# Patient Record
Sex: MALE | Race: WHITE | Employment: OTHER | URBAN - METROPOLITAN AREA
[De-identification: names, ages, dates, MRNs, and addresses within clinical notes are randomized per-mention and may not be internally consistent; named-entity substitution may affect disease eponyms.]

---

## 2018-11-04 ENCOUNTER — APPOINTMENT (OUTPATIENT)
Dept: CARDIOLOGY | Facility: CLINIC | Age: 68
DRG: 233 | End: 2018-11-04
Attending: HOSPITALIST
Payer: COMMERCIAL

## 2018-11-04 ENCOUNTER — HOSPITAL ENCOUNTER (INPATIENT)
Facility: CLINIC | Age: 68
LOS: 8 days | Discharge: HOME | DRG: 233 | End: 2018-11-12
Attending: EMERGENCY MEDICINE | Admitting: HOSPITALIST
Payer: COMMERCIAL

## 2018-11-04 ENCOUNTER — APPOINTMENT (OUTPATIENT)
Dept: CARDIOLOGY | Facility: CLINIC | Age: 68
DRG: 233 | End: 2018-11-04
Attending: INTERNAL MEDICINE
Payer: COMMERCIAL

## 2018-11-04 ENCOUNTER — APPOINTMENT (OUTPATIENT)
Dept: GENERAL RADIOLOGY | Facility: CLINIC | Age: 68
DRG: 233 | End: 2018-11-04
Attending: EMERGENCY MEDICINE
Payer: COMMERCIAL

## 2018-11-04 ENCOUNTER — APPOINTMENT (OUTPATIENT)
Dept: CT IMAGING | Facility: CLINIC | Age: 68
DRG: 233 | End: 2018-11-04
Attending: EMERGENCY MEDICINE
Payer: COMMERCIAL

## 2018-11-04 DIAGNOSIS — G89.18 ACUTE POST-OPERATIVE PAIN: ICD-10-CM

## 2018-11-04 DIAGNOSIS — J18.9 PNEUMONIA DUE TO INFECTIOUS ORGANISM, UNSPECIFIED LATERALITY, UNSPECIFIED PART OF LUNG: ICD-10-CM

## 2018-11-04 DIAGNOSIS — I21.4 NSTEMI (NON-ST ELEVATED MYOCARDIAL INFARCTION) (H): ICD-10-CM

## 2018-11-04 DIAGNOSIS — I25.5 ISCHEMIC CARDIOMYOPATHY: ICD-10-CM

## 2018-11-04 DIAGNOSIS — Z95.1 S/P CABG (CORONARY ARTERY BYPASS GRAFT): Primary | ICD-10-CM

## 2018-11-04 PROBLEM — I24.9 ACS (ACUTE CORONARY SYNDROME) (H): Status: ACTIVE | Noted: 2018-11-04

## 2018-11-04 LAB
ALBUMIN SERPL-MCNC: 3.9 G/DL (ref 3.4–5)
ALP SERPL-CCNC: 87 U/L (ref 40–150)
ALT SERPL W P-5'-P-CCNC: 19 U/L (ref 0–70)
ANION GAP SERPL CALCULATED.3IONS-SCNC: 8 MMOL/L (ref 3–14)
AST SERPL W P-5'-P-CCNC: 34 U/L (ref 0–45)
BASE EXCESS BLDV CALC-SCNC: 1 MMOL/L
BASOPHILS # BLD AUTO: 0 10E9/L (ref 0–0.2)
BASOPHILS NFR BLD AUTO: 0.1 %
BILIRUB SERPL-MCNC: 1.1 MG/DL (ref 0.2–1.3)
BUN SERPL-MCNC: 19 MG/DL (ref 7–30)
CALCIUM SERPL-MCNC: 9 MG/DL (ref 8.5–10.1)
CHLORIDE SERPL-SCNC: 107 MMOL/L (ref 94–109)
CO2 BLDCOV-SCNC: 24 MMOL/L (ref 21–28)
CO2 SERPL-SCNC: 25 MMOL/L (ref 20–32)
CREAT SERPL-MCNC: 1.03 MG/DL (ref 0.66–1.25)
DIFFERENTIAL METHOD BLD: ABNORMAL
EOSINOPHIL # BLD AUTO: 0 10E9/L (ref 0–0.7)
EOSINOPHIL NFR BLD AUTO: 0.1 %
ERYTHROCYTE [DISTWIDTH] IN BLOOD BY AUTOMATED COUNT: 12.4 % (ref 10–15)
ERYTHROCYTE [DISTWIDTH] IN BLOOD BY AUTOMATED COUNT: 12.6 % (ref 10–15)
FLUAV+FLUBV AG SPEC QL: NEGATIVE
FLUAV+FLUBV AG SPEC QL: NEGATIVE
GFR SERPL CREATININE-BSD FRML MDRD: 71 ML/MIN/1.7M2
GLUCOSE SERPL-MCNC: 132 MG/DL (ref 70–99)
GRAM STN SPEC: NORMAL
HBA1C MFR BLD: 5.9 % (ref 0–5.6)
HCO3 BLDV-SCNC: 27 MMOL/L (ref 21–28)
HCT VFR BLD AUTO: 37.2 % (ref 40–53)
HCT VFR BLD AUTO: 42.9 % (ref 40–53)
HGB BLD-MCNC: 12.8 G/DL (ref 13.3–17.7)
HGB BLD-MCNC: 14.6 G/DL (ref 13.3–17.7)
IMM GRANULOCYTES # BLD: 0.1 10E9/L (ref 0–0.4)
IMM GRANULOCYTES NFR BLD: 0.4 %
INTERPRETATION ECG - MUSE: NORMAL
INTERPRETATION ECG - MUSE: NORMAL
LACTATE BLD-SCNC: 1.4 MMOL/L (ref 0.7–2.1)
LMWH PPP CHRO-ACNC: 0.18 IU/ML
LMWH PPP CHRO-ACNC: 0.28 IU/ML
LYMPHOCYTES # BLD AUTO: 0.7 10E9/L (ref 0.8–5.3)
LYMPHOCYTES NFR BLD AUTO: 5.3 %
Lab: NORMAL
MCH RBC QN AUTO: 34.6 PG (ref 26.5–33)
MCH RBC QN AUTO: 34.9 PG (ref 26.5–33)
MCHC RBC AUTO-ENTMCNC: 34 G/DL (ref 31.5–36.5)
MCHC RBC AUTO-ENTMCNC: 34.4 G/DL (ref 31.5–36.5)
MCV RBC AUTO: 101 FL (ref 78–100)
MCV RBC AUTO: 102 FL (ref 78–100)
MONOCYTES # BLD AUTO: 1.1 10E9/L (ref 0–1.3)
MONOCYTES NFR BLD AUTO: 8.2 %
NEUTROPHILS # BLD AUTO: 11.9 10E9/L (ref 1.6–8.3)
NEUTROPHILS NFR BLD AUTO: 85.9 %
NRBC # BLD AUTO: 0 10*3/UL
NRBC BLD AUTO-RTO: 0 /100
OXYHGB MFR BLDV: 25 %
PCO2 BLDV: 41 MM HG (ref 40–50)
PCO2 BLDV: 45 MM HG (ref 40–50)
PH BLDV: 7.37 PH (ref 7.32–7.43)
PH BLDV: 7.38 PH (ref 7.32–7.43)
PLATELET # BLD AUTO: 112 10E9/L (ref 150–450)
PLATELET # BLD AUTO: 129 10E9/L (ref 150–450)
PO2 BLDV: 18 MM HG (ref 25–47)
PO2 BLDV: 24 MM HG (ref 25–47)
POTASSIUM SERPL-SCNC: 4 MMOL/L (ref 3.4–5.3)
PROCALCITONIN SERPL-MCNC: <0.05 NG/ML
PROT SERPL-MCNC: 7.3 G/DL (ref 6.8–8.8)
RBC # BLD AUTO: 3.67 10E12/L (ref 4.4–5.9)
RBC # BLD AUTO: 4.22 10E12/L (ref 4.4–5.9)
SAO2 % BLDV FROM PO2: 42 %
SODIUM SERPL-SCNC: 140 MMOL/L (ref 133–144)
SPECIMEN SOURCE: NORMAL
SPECIMEN SOURCE: NORMAL
TROPONIN I SERPL-MCNC: 13.12 UG/L (ref 0–0.04)
TROPONIN I SERPL-MCNC: 17.82 UG/L (ref 0–0.04)
TROPONIN I SERPL-MCNC: 2.42 UG/L (ref 0–0.04)
WBC # BLD AUTO: 13.9 10E9/L (ref 4–11)
WBC # BLD AUTO: 14.5 10E9/L (ref 4–11)

## 2018-11-04 PROCEDURE — 93306 TTE W/DOPPLER COMPLETE: CPT | Mod: 26 | Performed by: INTERNAL MEDICINE

## 2018-11-04 PROCEDURE — 99285 EMERGENCY DEPT VISIT HI MDM: CPT | Mod: 25

## 2018-11-04 PROCEDURE — 99223 1ST HOSP IP/OBS HIGH 75: CPT | Mod: 25 | Performed by: INTERNAL MEDICINE

## 2018-11-04 PROCEDURE — 86698 HISTOPLASMA ANTIBODY: CPT | Performed by: INTERNAL MEDICINE

## 2018-11-04 PROCEDURE — 82803 BLOOD GASES ANY COMBINATION: CPT

## 2018-11-04 PROCEDURE — 86606 ASPERGILLUS ANTIBODY: CPT | Performed by: INTERNAL MEDICINE

## 2018-11-04 PROCEDURE — 40000264 ECHO COMPLETE WITH OPTISON

## 2018-11-04 PROCEDURE — 96368 THER/DIAG CONCURRENT INF: CPT

## 2018-11-04 PROCEDURE — 85027 COMPLETE CBC AUTOMATED: CPT | Performed by: HOSPITALIST

## 2018-11-04 PROCEDURE — 25000132 ZZH RX MED GY IP 250 OP 250 PS 637: Performed by: EMERGENCY MEDICINE

## 2018-11-04 PROCEDURE — 87804 INFLUENZA ASSAY W/OPTIC: CPT | Performed by: HOSPITALIST

## 2018-11-04 PROCEDURE — 36415 COLL VENOUS BLD VENIPUNCTURE: CPT | Performed by: INTERNAL MEDICINE

## 2018-11-04 PROCEDURE — 99152 MOD SED SAME PHYS/QHP 5/>YRS: CPT | Performed by: INTERNAL MEDICINE

## 2018-11-04 PROCEDURE — 86635 COCCIDIOIDES ANTIBODY: CPT | Performed by: INTERNAL MEDICINE

## 2018-11-04 PROCEDURE — 25000128 H RX IP 250 OP 636: Performed by: EMERGENCY MEDICINE

## 2018-11-04 PROCEDURE — 96376 TX/PRO/DX INJ SAME DRUG ADON: CPT

## 2018-11-04 PROCEDURE — 71260 CT THORAX DX C+: CPT

## 2018-11-04 PROCEDURE — 87305 ASPERGILLUS AG IA: CPT | Performed by: INTERNAL MEDICINE

## 2018-11-04 PROCEDURE — 25000132 ZZH RX MED GY IP 250 OP 250 PS 637: Performed by: HOSPITALIST

## 2018-11-04 PROCEDURE — 93005 ELECTROCARDIOGRAM TRACING: CPT

## 2018-11-04 PROCEDURE — 96361 HYDRATE IV INFUSION ADD-ON: CPT

## 2018-11-04 PROCEDURE — 87040 BLOOD CULTURE FOR BACTERIA: CPT | Performed by: EMERGENCY MEDICINE

## 2018-11-04 PROCEDURE — 87449 NOS EACH ORGANISM AG IA: CPT | Performed by: INTERNAL MEDICINE

## 2018-11-04 PROCEDURE — 87070 CULTURE OTHR SPECIMN AEROBIC: CPT | Performed by: HOSPITALIST

## 2018-11-04 PROCEDURE — 83036 HEMOGLOBIN GLYCOSYLATED A1C: CPT | Performed by: EMERGENCY MEDICINE

## 2018-11-04 PROCEDURE — 21000000 ZZH R&B IMCU HEART CARE

## 2018-11-04 PROCEDURE — 83605 ASSAY OF LACTIC ACID: CPT

## 2018-11-04 PROCEDURE — 99207 ZZC NON-BILLABLE SERV PER CHARTING: CPT | Performed by: INTERNAL MEDICINE

## 2018-11-04 PROCEDURE — 84484 ASSAY OF TROPONIN QUANT: CPT | Performed by: EMERGENCY MEDICINE

## 2018-11-04 PROCEDURE — 96366 THER/PROPH/DIAG IV INF ADDON: CPT

## 2018-11-04 PROCEDURE — 96365 THER/PROPH/DIAG IV INF INIT: CPT | Mod: 59

## 2018-11-04 PROCEDURE — 94640 AIRWAY INHALATION TREATMENT: CPT

## 2018-11-04 PROCEDURE — 86612 BLASTOMYCES ANTIBODY: CPT | Performed by: INTERNAL MEDICINE

## 2018-11-04 PROCEDURE — 93458 L HRT ARTERY/VENTRICLE ANGIO: CPT | Mod: 26 | Performed by: INTERNAL MEDICINE

## 2018-11-04 PROCEDURE — 25000132 ZZH RX MED GY IP 250 OP 250 PS 637: Performed by: INTERNAL MEDICINE

## 2018-11-04 PROCEDURE — 93010 ELECTROCARDIOGRAM REPORT: CPT | Performed by: INTERNAL MEDICINE

## 2018-11-04 PROCEDURE — 71046 X-RAY EXAM CHEST 2 VIEWS: CPT

## 2018-11-04 PROCEDURE — 87385 HISTOPLASMA CAPSUL AG IA: CPT | Performed by: INTERNAL MEDICINE

## 2018-11-04 PROCEDURE — 99223 1ST HOSP IP/OBS HIGH 75: CPT | Mod: AI | Performed by: HOSPITALIST

## 2018-11-04 PROCEDURE — 85025 COMPLETE CBC W/AUTO DIFF WBC: CPT | Performed by: EMERGENCY MEDICINE

## 2018-11-04 PROCEDURE — 25000128 H RX IP 250 OP 636: Performed by: HOSPITALIST

## 2018-11-04 PROCEDURE — 25500064 ZZH RX 255 OP 636: Performed by: HOSPITALIST

## 2018-11-04 PROCEDURE — 80053 COMPREHEN METABOLIC PANEL: CPT | Performed by: EMERGENCY MEDICINE

## 2018-11-04 PROCEDURE — 87205 SMEAR GRAM STAIN: CPT | Performed by: HOSPITALIST

## 2018-11-04 PROCEDURE — 36415 COLL VENOUS BLD VENIPUNCTURE: CPT | Performed by: HOSPITALIST

## 2018-11-04 PROCEDURE — 93005 ELECTROCARDIOGRAM TRACING: CPT | Mod: 76

## 2018-11-04 PROCEDURE — 85520 HEPARIN ASSAY: CPT | Performed by: HOSPITALIST

## 2018-11-04 PROCEDURE — 25000125 ZZHC RX 250: Performed by: EMERGENCY MEDICINE

## 2018-11-04 PROCEDURE — 82805 BLOOD GASES W/O2 SATURATION: CPT | Performed by: EMERGENCY MEDICINE

## 2018-11-04 PROCEDURE — 84145 PROCALCITONIN (PCT): CPT | Performed by: HOSPITALIST

## 2018-11-04 PROCEDURE — 84484 ASSAY OF TROPONIN QUANT: CPT | Performed by: HOSPITALIST

## 2018-11-04 PROCEDURE — 33967 INSERT I-AORT PERCUT DEVICE: CPT | Performed by: INTERNAL MEDICINE

## 2018-11-04 RX ORDER — COVID-19 ANTIGEN TEST
220 KIT MISCELLANEOUS 2 TIMES DAILY WITH MEALS
Status: ON HOLD | COMMUNITY
End: 2018-11-12

## 2018-11-04 RX ORDER — ASPIRIN 81 MG/1
81 TABLET, CHEWABLE ORAL DAILY
Status: DISCONTINUED | OUTPATIENT
Start: 2018-11-05 | End: 2018-11-05

## 2018-11-04 RX ORDER — CEFTRIAXONE 1 G/1
1 INJECTION, POWDER, FOR SOLUTION INTRAMUSCULAR; INTRAVENOUS EVERY 24 HOURS
Status: COMPLETED | OUTPATIENT
Start: 2018-11-05 | End: 2018-11-10

## 2018-11-04 RX ORDER — OXYCODONE HYDROCHLORIDE 5 MG/1
5-10 TABLET ORAL
Status: DISCONTINUED | OUTPATIENT
Start: 2018-11-04 | End: 2018-11-06

## 2018-11-04 RX ORDER — LORATADINE 10 MG/1
10 TABLET ORAL DAILY
COMMUNITY
End: 2022-08-03 | Stop reason: ALTCHOICE

## 2018-11-04 RX ORDER — ASPIRIN 81 MG/1
324 TABLET, CHEWABLE ORAL ONCE
Status: COMPLETED | OUTPATIENT
Start: 2018-11-04 | End: 2018-11-04

## 2018-11-04 RX ORDER — PREDNISONE 20 MG/1
60 TABLET ORAL ONCE
Status: COMPLETED | OUTPATIENT
Start: 2018-11-04 | End: 2018-11-04

## 2018-11-04 RX ORDER — ASPIRIN 81 MG/1
324 TABLET, CHEWABLE ORAL ONCE
Status: DISCONTINUED | OUTPATIENT
Start: 2018-11-04 | End: 2018-11-04

## 2018-11-04 RX ORDER — LIDOCAINE 40 MG/G
CREAM TOPICAL
Status: DISCONTINUED | OUTPATIENT
Start: 2018-11-04 | End: 2018-11-04

## 2018-11-04 RX ORDER — IPRATROPIUM BROMIDE AND ALBUTEROL SULFATE 2.5; .5 MG/3ML; MG/3ML
3 SOLUTION RESPIRATORY (INHALATION) ONCE
Status: COMPLETED | OUTPATIENT
Start: 2018-11-04 | End: 2018-11-04

## 2018-11-04 RX ORDER — ASPIRIN 325 MG
325 TABLET ORAL DAILY
Status: DISCONTINUED | OUTPATIENT
Start: 2018-11-05 | End: 2018-11-04

## 2018-11-04 RX ORDER — AMOXICILLIN 250 MG
2 CAPSULE ORAL 2 TIMES DAILY PRN
Status: DISCONTINUED | OUTPATIENT
Start: 2018-11-04 | End: 2018-11-06

## 2018-11-04 RX ORDER — AMOXICILLIN 250 MG
1 CAPSULE ORAL 2 TIMES DAILY PRN
Status: DISCONTINUED | OUTPATIENT
Start: 2018-11-04 | End: 2018-11-06

## 2018-11-04 RX ORDER — PREDNISONE 20 MG/1
40 TABLET ORAL DAILY
Status: DISCONTINUED | OUTPATIENT
Start: 2018-11-05 | End: 2018-11-08

## 2018-11-04 RX ORDER — IPRATROPIUM BROMIDE AND ALBUTEROL SULFATE 2.5; .5 MG/3ML; MG/3ML
3 SOLUTION RESPIRATORY (INHALATION) EVERY 4 HOURS PRN
Status: DISCONTINUED | OUTPATIENT
Start: 2018-11-04 | End: 2018-11-06

## 2018-11-04 RX ORDER — LIDOCAINE 40 MG/G
CREAM TOPICAL
Status: DISCONTINUED | OUTPATIENT
Start: 2018-11-04 | End: 2018-11-05

## 2018-11-04 RX ORDER — BISACODYL 10 MG
10 SUPPOSITORY, RECTAL RECTAL DAILY PRN
Status: DISCONTINUED | OUTPATIENT
Start: 2018-11-04 | End: 2018-11-06

## 2018-11-04 RX ORDER — NALOXONE HYDROCHLORIDE 0.4 MG/ML
.1-.4 INJECTION, SOLUTION INTRAMUSCULAR; INTRAVENOUS; SUBCUTANEOUS
Status: DISCONTINUED | OUTPATIENT
Start: 2018-11-04 | End: 2018-11-06

## 2018-11-04 RX ORDER — ATORVASTATIN CALCIUM 80 MG/1
80 TABLET, FILM COATED ORAL EVERY EVENING
Status: DISCONTINUED | OUTPATIENT
Start: 2018-11-04 | End: 2018-11-05

## 2018-11-04 RX ORDER — ALUMINA, MAGNESIA, AND SIMETHICONE 2400; 2400; 240 MG/30ML; MG/30ML; MG/30ML
30 SUSPENSION ORAL EVERY 4 HOURS PRN
Status: DISCONTINUED | OUTPATIENT
Start: 2018-11-04 | End: 2018-11-06

## 2018-11-04 RX ORDER — NICOTINE 21 MG/24HR
1 PATCH, TRANSDERMAL 24 HOURS TRANSDERMAL DAILY
Status: DISCONTINUED | OUTPATIENT
Start: 2018-11-04 | End: 2018-11-12 | Stop reason: HOSPADM

## 2018-11-04 RX ORDER — CEFTRIAXONE 1 G/1
1 INJECTION, POWDER, FOR SOLUTION INTRAMUSCULAR; INTRAVENOUS ONCE
Status: COMPLETED | OUTPATIENT
Start: 2018-11-04 | End: 2018-11-04

## 2018-11-04 RX ORDER — POLYETHYLENE GLYCOL 3350 17 G/17G
17 POWDER, FOR SOLUTION ORAL DAILY PRN
Status: DISCONTINUED | OUTPATIENT
Start: 2018-11-04 | End: 2018-11-06

## 2018-11-04 RX ORDER — HYDROMORPHONE HYDROCHLORIDE 1 MG/ML
0.2 INJECTION, SOLUTION INTRAMUSCULAR; INTRAVENOUS; SUBCUTANEOUS
Status: DISCONTINUED | OUTPATIENT
Start: 2018-11-04 | End: 2018-11-06

## 2018-11-04 RX ORDER — IOPAMIDOL 755 MG/ML
80 INJECTION, SOLUTION INTRAVASCULAR ONCE
Status: COMPLETED | OUTPATIENT
Start: 2018-11-04 | End: 2018-11-04

## 2018-11-04 RX ORDER — ONDANSETRON 4 MG/1
4 TABLET, ORALLY DISINTEGRATING ORAL EVERY 6 HOURS PRN
Status: DISCONTINUED | OUTPATIENT
Start: 2018-11-04 | End: 2018-11-06

## 2018-11-04 RX ORDER — ACETAMINOPHEN 650 MG/1
650 SUPPOSITORY RECTAL EVERY 4 HOURS PRN
Status: DISCONTINUED | OUTPATIENT
Start: 2018-11-04 | End: 2018-11-06

## 2018-11-04 RX ORDER — NITROGLYCERIN 0.4 MG/1
0.4 TABLET SUBLINGUAL EVERY 5 MIN PRN
Status: DISCONTINUED | OUTPATIENT
Start: 2018-11-04 | End: 2018-11-06

## 2018-11-04 RX ORDER — ONDANSETRON 2 MG/ML
4 INJECTION INTRAMUSCULAR; INTRAVENOUS EVERY 6 HOURS PRN
Status: DISCONTINUED | OUTPATIENT
Start: 2018-11-04 | End: 2018-11-06

## 2018-11-04 RX ORDER — ACETAMINOPHEN 325 MG/1
650 TABLET ORAL EVERY 4 HOURS PRN
Status: DISCONTINUED | OUTPATIENT
Start: 2018-11-04 | End: 2018-11-05 | Stop reason: DRUGHIGH

## 2018-11-04 RX ADMIN — ASPIRIN 81 MG 324 MG: 81 TABLET ORAL at 04:13

## 2018-11-04 RX ADMIN — ATORVASTATIN CALCIUM 80 MG: 80 TABLET, FILM COATED ORAL at 19:59

## 2018-11-04 RX ADMIN — PREDNISONE 60 MG: 20 TABLET ORAL at 03:57

## 2018-11-04 RX ADMIN — SODIUM CHLORIDE 1000 ML: 9 INJECTION, SOLUTION INTRAVENOUS at 07:19

## 2018-11-04 RX ADMIN — IPRATROPIUM BROMIDE AND ALBUTEROL SULFATE 3 ML: 2.5; .5 SOLUTION RESPIRATORY (INHALATION) at 04:03

## 2018-11-04 RX ADMIN — SODIUM CHLORIDE 1000 ML: 9 INJECTION, SOLUTION INTRAVENOUS at 06:14

## 2018-11-04 RX ADMIN — CEFTRIAXONE SODIUM 1 G: 1 INJECTION, POWDER, FOR SOLUTION INTRAMUSCULAR; INTRAVENOUS at 07:24

## 2018-11-04 RX ADMIN — HEPARIN SODIUM 1000 UNITS/HR: 10000 INJECTION, SOLUTION INTRAVENOUS at 05:08

## 2018-11-04 RX ADMIN — HUMAN ALBUMIN MICROSPHERES AND PERFLUTREN 9 ML: 10; .22 INJECTION, SOLUTION INTRAVENOUS at 11:00

## 2018-11-04 RX ADMIN — IOPAMIDOL 80 ML: 755 INJECTION, SOLUTION INTRAVENOUS at 06:01

## 2018-11-04 RX ADMIN — SODIUM CHLORIDE, PRESERVATIVE FREE 70 ML: 5 INJECTION INTRAVENOUS at 06:01

## 2018-11-04 RX ADMIN — SODIUM CHLORIDE 1000 ML: 9 INJECTION, SOLUTION INTRAVENOUS at 05:27

## 2018-11-04 RX ADMIN — Medication 5000 UNITS: at 05:09

## 2018-11-04 RX ADMIN — NICOTINE 1 PATCH: 14 PATCH, EXTENDED RELEASE TRANSDERMAL at 10:30

## 2018-11-04 RX ADMIN — AZITHROMYCIN MONOHYDRATE 500 MG: 500 INJECTION, POWDER, LYOPHILIZED, FOR SOLUTION INTRAVENOUS at 07:51

## 2018-11-04 RX ADMIN — SODIUM CHLORIDE 1000 ML: 9 INJECTION, SOLUTION INTRAVENOUS at 04:06

## 2018-11-04 ASSESSMENT — ENCOUNTER SYMPTOMS
VOMITING: 0
SHORTNESS OF BREATH: 1
COUGH: 1
CHILLS: 1
ABDOMINAL PAIN: 0
NAUSEA: 0

## 2018-11-04 NOTE — PLAN OF CARE
Problem: Patient Care Overview  Goal: Plan of Care/Patient Progress Review  OT/CR: Order rec'd. Noted patient planning cardiac cath tomorrow. Holding eval until complete.

## 2018-11-04 NOTE — CONSULTS
Consult Date:  11/04/2018      REASON FOR CONSULTATION:  Acute coronary syndrome.      HISTORY OF PRESENT ILLNESS:  Mr. Schoenecker is a 68-year-old male with a background history of dyslipidemia (untreated secondary to statin intolerance), longstanding smoker, a substantial family history of early onset coronary artery disease, who presents to [facility] with acute onset of shortness of breath and  burning retrosternal chest pain.  Mr. Schoenecker describes that he has had multiple episodes of exertional anginal-like chest pains x1 week.  Notably, he describes an occasion where he was chopping down a tree, and required frequent rest to resolve his burning chest discomfort.  Last evening, Mr. Schoenecker developed a cough and acute onset dyspnea at rest that prompted his evaluation in the Emergency Department.  While there, he was evaluated for ACS and provided DuoNeb which appeared to relieve a lot of his dyspnea.      He was subsequently provided aspirin and heparin, given his abnormal biomarker evaluation and ischemic appearing ECG.  He was subsequently admitted to the CCU for further care and evaluation.  It is in this context I was consulted.      I spoke with Mr. Schoenecker.  He is without active ischemic symptoms at this time.  He endorses the history above.  He describes no further complaints.      Vital signs reveal that he is afebrile, heart rate of 96, blood pressure 99/54, oxygen saturation 94% on 2 L nasal cannula.        LABORATORY DATA:  White count is elevated at 13.9, hemoglobin is 14.6, platelet count is 129.  Creatinine is normal at 1.03.  Electrolyte panel is otherwise largely unremarkable.  Index troponin is markedly elevated at 2.4.  Chest x-ray reveals no acute pulmonary infiltrates, absent cardiomegaly and subtle findings suggestive of pulmonary venous hypertension.  A CT of the chest reveals no acute aortic process, and some nodular opacities in the fields suggestive of  underlying infectious process.       A 12-lead ECG demonstrates lateral ST segment depressions and T-wave inversions.      A transthoracic echocardiogram has been ordered, and is currently pending.      FAMILY HISTORY:  Markedly positive for early onset coronary artery disease in family members.      SOCIAL HISTORY:  The patient is a long-standing tobacco user, quit yesterday.  No alcohol or drug use.      REVIEW OF SYSTEMS:  Otherwise negative except as what is already noted in the HPI.      PHYSICAL EXAMINATION:   GENERAL:  Chronically ill-appearing gentleman who appears older than stated age, obese, friendly, sensorium clear, anxious, family members present during the encounter.   HEENT:  No major abnormalities.   NECK:  Vessels, non-elevated JVP.   LUNGS:  Positive prolonged expiratory phase of breathing, positive scattered wheezes, increased AP diameter of the thoracic cavity, positive diminished respiratory excursion.   CARDIOVASCULAR:  Heart normal S1, S2.  No murmurs detected.   ABDOMEN:  Soft, nontender, nondistended.  Moderate adiposity.   EXTREMITIES:  Mild edema, acceptable perfusion.      IMPRESSION AND PLAN:   1.  Acute coronary syndrome.   2.  Untreated dyslipidemia.   3.  Statin intolerance.   4.  Smoking.     5.  Probable chronic obstructive pulmonary disease.   6.  Probable overlapping pneumonia.        Mr. Schoenecker presents to [facility] with evidence of an acute coronary syndrome (i.e. NSTEMI).  This occurs in the context of an apparent COPD exacerbation and also pneumonia.  The hospital medicine team is interested in providing dedicated therapy to address these additional concerns, and I am in agreement with this approach.      In terms of management for his coronary artery disease, we will seek antithrombotic therapies including aspirin and heparin.  We will make arrangements for a coronary catheterization to occur tomorrow.      I would like to see a transthoracic  echocardiogram to be performed today to assess his cardiovascular disease morbidity risk.  If he is with adequate left ventricular function and BP, I will seek to introduce low dose beta blockade.       Thank you for this consultation.  We will follow along carefully, and obtain a consent for cardiac catheterization to occur tomorrow.         GABE RIVAS MD             D: 2018   T: 2018   MT: ARPIT      Name:     SCHOENECKER, DAVID   MRN:      [id number]        Account:       [id number]   :      1950           Consult Date:  2018      Document: [id number]

## 2018-11-04 NOTE — ED PROVIDER NOTES
"  History     Chief Complaint:    Shortness of Breath       HPI   David Richard Schoenecker is a 68 year old male with a history of tobacco abuse who presents to the ED via EMS for evaluation of shortness of breath. The patient states that he has been having shortness about breath for about a week. A few days ago, he states that he was severally short of breath and experience burning chest pain while chopping wood.  He also reports that he and his wife have been sick with a cold and cough over the last few days.  Tonight, he states that he was extremely short of breath with only mild exertion with some burning in his chest which ultimately prompted him to call EMS and was brought to the ED. EMS states his oxygen saturation was in the upper 80's on room air and he had audible wheezes. They administered one duoneb and he states that he feels significantly better now. He notes that his wife has flu-like symptoms, cough, fever, and chills, which he has contracted as well. Patient has a low-grade fever of 99.6 here. The patient has no other complaints.     Allergies:  The patient has no known drug allergies.    Medications:    The patient is currently on no regular medications.    Past Medical History:    Hyperlipidemia    Past Surgical History:    Appendectomy  Tonsillectomy    Family History:    No past pertinent family history.    Social History:  Current every day smoker.  Negative for alcohol use.       Review of Systems   Constitutional: Positive for chills.   Respiratory: Positive for cough and shortness of breath.    Cardiovascular: Positive for chest pain.   Gastrointestinal: Negative for abdominal pain, nausea and vomiting.   All other systems reviewed and are negative.        Physical Exam   First Vitals:  BP: 111/55  Pulse: 125  Heart Rate: 117  Temp: 99.6  F (37.6  C)  Resp: 28  Height: 180.3 cm (5' 11\")  Weight: 97.1 kg (214 lb)  SpO2: 97 %      Physical Exam  General: Appears well-developed and " well-nourished.   Head: No signs of trauma.   Mouth/Throat: Oropharynx is clear and moist.   CV: Tachycardic and regular rhythm.    Resp: Wheezing bilaterally with decreased air movement.    GI: Soft. There is no tenderness.  No rebound or guarding.  Normal bowel sounds.  No CVA tenderness.  MSK: Normal range of motion. no edema. No Calf tenderness.  Neuro: The patient is alert and oriented.  Speech normal.  GCS 15  Skin: Skin is warm and dry. No rash noted.   Psych: normal mood and affect. behavior is normal.       Emergency Department Course   ECG:  Indication: SOB  Time: 0321  Vent. Rate 124 bpm. GA interval 126. QRS duration 86. QT/QTc 332/476. P-R-T axis 71 62 -63.  Sinus tachycardia. Cannot rule out Inferior infarct, age undetermined. ST and T wave abnormality, consider lateral ischemia. Abnormal ECG.  Read time: 0350    Indication: Hypotensive  Time: 0531  Vent. Rate 104 bpm. GA interval 120. QRS duration 88. QT/QTc 372/489. P-R-T axis 64 63 213.  Sinus tachycardia. Cannot rule out inferior infarct, age undetermined. ST and T wave abnormality, consider lateral ischemia. Abnormal ECG. Read time: 0536      Imaging:  Radiographic findings were communicated with the patient who voiced understanding of the findings.  XR Chest PA and LAT:   No radiographic evidence of acute chest abnormality as per radiology.    CT Aortic Survery with contrast:   1. No evidence of aortic dissection.  2. Nodular bilateral airspace opacities surrounded by groundglass  opacity. There is associated mediastinal and left hilar adenopathy.  Radiographic findings suggestive of a fungal infection, although  multifocal bacterial pneumonia or metastatic disease can have a  similar appearance.  3. Nonobstructing bilateral nephrolithiasis.  4. Small hiatal hernia as per radiology.      Laboratory:  CBC: WBC: 13.9 (H), HGB: 14.6, PLT: 129 (L)  CMP: Glucose 132 (H), o/w WNL (Creatinine: 1.03)    0338 Troponin: 2.420 (HH)  ISTAT VBG: pH 7.37 /  PCO2 41 / PO2 24 (L) / Bicarb 24 / lactic acid 1.4  VBG: pH 7.38 / PCO2 45 / PO2 18 (L) / Bicarb 27  Hemoglobin A1C: 5.9 (H)      Interventions:  0357 Prednisone 60 mg PO  0403 Duoneb 3 mLs nebulization  0406 NS 1,000 mLs IV  0413 Aspirin 324 mg PO  0508 Heparin 1,000 units/hr IV  0509 Heparin 5000 units IV  0527 NS 1,000 mLs IV  0614 NS 1,000 mLs IV  Please see MAR for full list of medications administered in the ED.      Emergency Department Course:  Nursing notes and vitals reviewed. (0342) I performed an exam of the patient as documented above.     EKG obtained in the ED, see results above.     IV inserted. Medicine administered as documented above. Blood drawn. This was sent to the lab for further testing, results above.     The patient was sent for a chest XR and Aortic survey CT while in the emergency department, findings above.     0430 I rechecked the patient and discussed the results of his workup thus far.     0447  I consulted with Dr. Rodriguez of the hospitalist services. They are in agreement to accept the patient for admission.    0645 I consulted with Cardiology regarding the patient's history and presentation here in the emergency department.    0651 I consulted with Dr. Rodriguez regarding the patient's history and presentation here in the emergency department.    Findings and plan explained to the Patient who consents to admission. Discussed the patient with Dr. Rodriguez, who will admit the patient to a [facility] bed for further monitoring, evaluation, and treatment.    Impression & Plan    Medical Decision Making:  David Schoenecker is a 68-year-old gentleman who presents due to shortness of breath and some chest discomfort. He reports that he has had a cough for a few days and when he was doing work outside, he felt much more short of breath and had some discomfort in the chest with minimal exertion.  This evening, he felt more short of breath and ultimately came into the ER.  On initial presentation, he  was breathing through pursed lips and had wheezing bilaterally.  He had been given nebulizers and actually felt quite a bit better.  He does not have a diagnosis of asthma or COPD, but does smoke quite a bit.  EKG did show some depressions in the lateral leads and blood work was obtained.  I did obtain a lactic acid which was negative but his troponin came back significantly elevated at 2.4.  Patient had been given aspirin and heparin was initiated.  Patient's blood pressures initially were appropriate but became somewhat soft dipping into the 80s.  He was given IV fluids and ultimately these did rebound.  Given this, I did obtain a CT scan to evaluate the aorta primarily and this did not show any signs of dissection.  On the CT scan, there was some signs of possible pneumonia versus fungal infection versus metastatic disease.  It was decided to initiate antibiotics as the patient has been having a cough and he will be admitted for continued monitoring.  I did discuss the case with cardiology given the soft blood pressures who recommended continued monitoring and an echo on admission but did not recommend going directly to the Cath Lab at this time.  Overall patient did feel considerably better after treatment in the emergency department.      Critical Care time:  30 minutes excluding procedures    Diagnosis:    ICD-10-CM    1. NSTEMI (non-ST elevated myocardial infarction) (H) I21.4 Hemoglobin A1c     Hemoglobin A1c     CANCELED: Hemoglobin A1c   2. Pneumonia due to infectious organism, unspecified laterality, unspecified part of lung J18.9        Disposition:  Admitted to Dr. Rodriguez    Scribe Disclosure:  I,  German Terrazas, am serving as a scribe on 11/4/2018 at 3:42 AM to personally document services performed by Arnulfo Odonnell MD based on my observations and the provider's statements to me.          German Terrazas  11/4/2018    EMERGENCY DEPARTMENT       Arnulfo Odonnell MD  11/04/18 [id number]

## 2018-11-04 NOTE — CONSULTS
[facility]    Infectious Disease Consultation     Date of Admission:  11/4/2018  Date of Consult (When I saw the patient): 11/04/18    Assessment & Plan   David Richard Schoenecker is a 68 year old male who was admitted on 11/4/2018.     Impression:  1. 68 y.o male with ACS.   2. Smoking. ? COPD.   3. Possible pneumonia.   4. For now on CAP treatment.   5. Imaging raising possibility of fungal process vs malignancy.     Recommendations:   Get sputum for cultures.   Consult pulmonary, possible bronch.   Fungal lab studies ordered.   Continue on CAP antibiotics.           Peter Hickman MD    Reason for Consult   Reason for consult: I was asked by Dr. Rodriguez to evaluate this patient for pneumonia.    Primary Care Physician   Physician No Ref-Primary    Chief Complaint   Shortness of breath     History is obtained from the patient and medical records    History of Present Illness   David Richard Schoenecker is a 68 year old male who presented with dyspnea today while at rest, persistent all evening causing him to come in. He has had associated burning chest discomfort with left shoulder and arm numbness as well. He adds that he thinks the symptoms started about a week ago after he had been chopping some wood; that day they went away with rest but have been intermittent since with exertion, and then tonight they came on at rest as well. He and his family have been sick with the flu recently by his report. He denies fever, abdominal pain, leg swelling, or any other acute complaints and feels better in the ED after getting a nebulizer.     Past Medical History   I have reviewed this patient's medical history and updated it with pertinent information if needed.   Past Medical History:   Diagnosis Date     High cholesterol        Past Surgical History   I have reviewed this patient's surgical history and updated it with pertinent information if needed.  Past Surgical History:   Procedure Laterality Date      APPENDECTOMY       ENT SURGERY      tonsillectomy       Prior to Admission Medications   Prior to Admission Medications   Prescriptions Last Dose Informant Patient Reported? Taking?   loratadine (CLARITIN) 10 MG tablet 11/3/2018 at Unknown time Self Yes Yes   Sig: Take 10 mg by mouth daily   naproxen sodium 220 MG capsule 11/3/2018 at Unknown time Self Yes Yes   Sig: Take 220 mg by mouth 2 times daily (with meals)      Facility-Administered Medications: None     Allergies   No Known Allergies    Immunization History     There is no immunization history on file for this patient.    Social History   I have reviewed this patient's social history and updated it with pertinent information if needed. Genaro Whaley Schoeneckroberto  reports that he has been smoking.  He has been smoking about 0.75 packs per day. He has never used smokeless tobacco. He reports that he does not drink alcohol or use illicit drugs.    Family History   I have reviewed this patient's family history and updated it with pertinent information if needed.   Family History   Problem Relation Age of Onset     Coronary Artery Disease Mother        Review of Systems   The 10 point Review of Systems is negative other than noted in the HPI or here.     Physical Exam   Temp: 97  F (36.1  C) Temp src: Oral BP: 99/54 Pulse: 96 Heart Rate: 91 Resp: 19 SpO2: 94 % O2 Device: Nasal cannula Oxygen Delivery: 2 LPM  Vital Signs with Ranges  Temp:  [97  F (36.1  C)-99.6  F (37.6  C)] 97  F (36.1  C)  Pulse:  [] 96  Heart Rate:  [] 91  Resp:  [16-28] 19  BP: ()/(43-69) 99/54  SpO2:  [91 %-98 %] 94 %  220 lbs 10.89 oz  Body mass index is 30.78 kg/(m^2).    GENERAL APPEARANCE:  alert and no distress  EYES: Eyes grossly normal to inspection, PERRL and conjunctivae and sclerae normal  HENT: ear canals and TM's normal and nose and mouth without ulcers or lesions  NECK: no adenopathy, no asymmetry, masses, or scars and thyroid normal to palpation  RESP: lungs  clear to auscultation - no rales, rhonchi or wheezes  CV: regular rates and rhythm, normal S1 S2, no S3 or S4 and no murmur, click or rub  LYMPHATICS: normal ant/post cervical and supraclavicular nodes  ABDOMEN: soft, nontender, without hepatosplenomegaly or masses and bowel sounds normal  MS: extremities normal- no gross deformities noted  SKIN: no suspicious lesions or rashes      Data   Lab Results   Component Value Date    WBC 13.9 (H) 11/04/2018    HGB 14.6 11/04/2018    HCT 42.9 11/04/2018     (L) 11/04/2018     11/04/2018    POTASSIUM 4.0 11/04/2018    CHLORIDE 107 11/04/2018    CO2 25 11/04/2018    BUN 19 11/04/2018    CR 1.03 11/04/2018     (H) 11/04/2018    TROPI 2.420 (HH) 11/04/2018    AST 34 11/04/2018    ALT 19 11/04/2018    ALKPHOS 87 11/04/2018    BILITOTAL 1.1 11/04/2018       Recent Labs  Lab 11/04/18  0720 11/04/18  0338   CULT No growth after 1 hour No growth after 1 hour     Recent Labs   Lab Test  11/04/18   0720  11/04/18   0338   CULT  No growth after 1 hour  No growth after 1 hour

## 2018-11-04 NOTE — IP AVS SNAPSHOT
MRN:[id number]                      After Visit Summary   11/4/2018    David Richard Schoenecker    MRN: [id number]           Thank you!     Thank you for choosing [facility] for your care. Our goal is always to provide you with excellent care. Hearing back from our patients is one way we can continue to improve our services. Please take a few minutes to complete the written survey that you may receive in the mail after you visit with us. Thank you!        Patient Information     Date Of Birth          1950        Designated Caregiver       Most Recent Value    Caregiver    Will someone help with your care after discharge? yes    Name of designated caregiver Evelyn Schoenecker    Phone number of caregiver [phone]    Caregiver address see demographic sheet      About your hospital stay     You were admitted on:  November 4, 2018 You last received care in the:  [facility] [address] [facility]    You were discharged on:  November 12, 2018        Reason for your hospital stay       You had a heart attack and had a coronary artery bypass surgery with Dr. Wan                  Who to Call     For medical emergencies, please call 911.  For non-urgent questions about your medical care, please call your primary care provider or clinic, [phone]  For questions related to your surgery, please call your surgery clinic        Attending Provider     Provider Specialty    Arnulfo Odonnell MD Emergency Medicine    Jerome Rodriguez MD Internal Medicine    Sander Wan MD Thoracic Diseases       Primary Care Provider Office Phone # Fax #    Naseem Ramirez [phone]       When to contact your care team       Call your primary doctor or surgery team if you have any of the following: temperature greater than 101 F,  increased shortness of breath, increased drainage, increased swelling or increased pain. Please call if you have any weight gain  more than 3 pounds overnight or 5 pounds in a week.                  After Care Instructions     Activity       Activity as tolerated with sternal precautions. No lifting more than 10 pounds for first 6 weeks, then no lifting more than 20 pounds for an additional 6 weeks. Avoid lifting arms above shoulders. After these 12 weeks, activity as tolerated with pain. Avoid strenuous activities such as bowling, vacuuming, raking, shoveling, golf or tennis for 12 weeks after your surgery.    No driving for 4 weeks. If you continue to take narcotics after 4 weeks, no driving until you are off narcotics.            Diet       Follow this diet upon discharge: Orders Placed This Encounter      Advance Diet as Tolerated: Regular Diet Adult            Monitor and record       blood pressure daily  pulse daily  weight every day            Wound care and dressings       You have dissolvable sutures under your skin that do not need to be removed.  Pat wound dressing dry after showers.  Skin glue will eventually fall off in 1-2 weeks.     Keep wound clean and dry, showers are okay after discharge, but don't let spray hit directly on incision. No baths or swimming for 1 month.  Clean wounds twice a day for 2 weeks with microklenz spray if available. Cover chest tube sites with gauze until they stop draining, then leave open.                  Follow-up Appointments     Follow-up and recommended labs and tests        Follow up with your primary care provider, Naseem Ramirez, within 3-5 days of discharge to evaluate after surgery and for hospital follow- up. The following labs/tests are recommended: CBC, BMP.  Follow-up with [facility] clinic in 1 week. This appointment will be on your discharge paperwork.  Follow up with cardiothoracic surgery within 1-2 weeks of discharge. This follow-up appointment is scheduled for 11/28/2018 at 2:00pm and will be listed on your after visit summary paperwork when you leave the hospital. If you have  questions, call [phone].  Follow up with your primary cardiologist in 4-6 weeks from surgery to evaluate medication change, to evaluate treatment change, to evaluate after surgery and for hospital follow- up. The following labs/tests are recommended: echo.                  Your next 10 appointments already scheduled     Nov 20, 2018  1:00 PM CST   LAB with [facility]   [facility] [place] ([facility])    [address] [address]  [place]  [place] [address]   [phone]           Please do not eat 10-12 hours before your appointment if you are coming in fasting for labs on lipids, cholesterol, or glucose (sugar). This does not apply to pregnant women. Water, hot tea and black coffee (with nothing added) are okay. Do not drink other fluids, diet soda or chew gum.            Nov 20, 2018  1:50 PM CST   CORE Enrollment with Marjorie Monroy PA-C   [facility] ([facility])    [facility] [address] [address] [phone] OPT 2            Nov 28, 2018  2:00 PM CST   Post-Op with [facility] CARDIOTHORACIC SURGERY, PA   [facility] ([facility])    [address]  [place] [address] [phone]            Jan 22, 2019  1:10 PM CST   Return Visit with RAVI Guerrero CNP   [facility] ([facility])    [address] [address] [phone] OPT 2              Additional Services     CARDIAC REHAB REFERRAL       Please be aware that coverage of these services is subject to the terms and limitations of your health insurance plan.  Call member services at your health plan with any benefit or coverage questions.     Order is sent electronically to central rehab scheduling. Call [phone] if you haven't been contacted regarding these appointments within 2 business days of discharge.            Follow-Up  with [facility]                 Future tests that were ordered for you     Basic metabolic panel           N terminal pro BNP outpatient           TSH with free T4 reflex       Last Lab Result: No results found for: TSH                  Further instructions from your care team       YOUR CARE AFTER HEART SURGERY   DISCHARGE INTRUCTIONS      Weight - Weigh yourself daily and record on daily weight sheet provided in this packet.     Incentive Spirometer - Continue to use 3 to 4 times a day for 10 days; follow use of spirometer with coughing. Use attached sheet to report progress.     Daily shower - Wash all surgical incisions daily with liquid antibacterial soap, such as Dial.   No tub baths until incisions are healed. The sticky glue used to close your incisions may peel off slowly.    Incisions - Avoid all lotions, oils, ointments or sunscreen on incisions for 8 weeks. Avoid sunlight on incision sites for 8 weeks and then use sunscreen on incisions for one year.   You may have numbness, tingling, and increased sensitivity around your incision lines for several weeks/months as the nerves grow back. Some drainage from the sites where your chest tubes were is normal and can persist for a while until it has healed. It is best to keep this open to air to allow scab formation and healing, you can cover it with a gauze pad or band-aid if needed.     Diet - Eat a well balanced diet to promote healing. It can be normal to have a decreased appetite for a while after surgery. You can eat whatever it takes to keep up a normal food/calorie intake in the immediate post-operative period for about a month. Try to limit high sodium (salt) foods to prevent fluid retention.  If you are a diabetic, continue to balance your carbohydrate intake with your medicine. Eventually you will need to adopt a heart healthy diet, especially if you have coronary artery disease.     Daily exercise/activity precautions - Cardiac rehab therapist will  review your restrictions with you.  No lifting, pushing or pulling anything over 10 pounds for 12 weeks if you are a diabetic or 8 weeks if you are not a diabetic (reference: a gallon of milk weighs 8 pounds).    Positioning -Keep your legs elevated above the level of your heart when you are in bed. You may lie on your side and stomach if it s comfortable and you have no sternal click (popping in breastbone).    Pain medications - Use as directed. Call surgeon for pain medication refill if needed. Other medications should be filled by your primary doctor.     Depression - It is not uncommon after surgery.  If it lasts longer than two weeks or you feel you need to talk to someone, contact your primary physician.    Driving -No driving for 4 weeks from the day of surgery (or until your surgeon has approved it).    Work and Travel - Consult with your physician about specific work and travel restrictions    Cardiac Rehabilitation - Usually begins approximately one week after discharge and lasts up to 6 weeks. In the meantime, continue to work on the rehab activities you learned in the hospital and maintain your physical activity. Aerobic activity, especially walking, is a great way to regain your physical endurance.     Checking your Blood sugar (glucose) - If you have been instructed to begin checking your blood sugars at home, record them on the back page of this packet and take the packet with you to your follow appointment with you primary physician (usually about 1 week after surgery).          CALL YOUR SURGEON IF ANY OF THE FOLLOWING OCCURS:      Fever - If you feel chills, shaking, or your temperature is over 101 degrees    Sternal Click - Some popping or clicking sensations can be normal as the chest has been stretched open. If you notice a significant change or if pain becomes bothersome, please call.    Angina/Chest Pain - Or symptoms similar to those you experienced before surgery    Infection - If incisions  look infected (increasing redness, tenderness, swelling, warmth, odor, drainage, or change in color if drainage).    Weight gain - Please call if weight gain of 2-3pounds over 24 hours or if greater than 5 pounds in 1 week as this may indicate fluid overload and could signify a problem with your heart.     Swelling - If you notice worsening swelling in your legs. A certain amount of swelling is expected, especially if you had a vein taken out of your leg for bypass surgery.      Shortness of breath - Not relieved by rest    Persistent heart palpitations - Rapid, pounding heartbeat    Dizziness/lightheadedness - While sitting or at rest    Pain - Not relieved by pain medications      Your Daily Weight  Weigh yourself every morning in the same clothes after urinating and before breakfast.* Call your physician if your weight increases 2-3 lbs in one day or 3-5 lbs in a week**  My baseline weight (first morning home):____________   Date Weight   Date Weight   1.    17.     2.    18.     3.    19.     4.    20.     5.    21.     6.    22.     7.    23.     8.    24.     9.    25.     10.    26.     11.    27.     12.    28.     13.    29.     14.    30.     15.    31.     16.    32.           Incentive Spirometer- After Surgery Progress Record    Discharge Volume_______________ml    As you recover from your surgery it is important to continue the use of your incentive spirometer at home.  We recommend that you use your spirometer at least 3-4 times per day.  You should take 5 slow deep breaths with each use. Inhale slowly to raise the piston in the chamber as high as you are able.  Hold breath to count of 3 and then exhale normally.  Allow piston to return to bottom of chamber, rest and repeat 4 more times. It is helpful to see your progress by recording your average volume in the spaces provided below.    Day  1       Day  2       Day  3       Day  4       Day  5       Day  6       Day  7       Day  8       Day  9      "  Day  10             Pending Results     Date and Time Order Name Status Description    2018 1731 Blood culture Preliminary     2018 1657 Blood culture Preliminary     2018 1134 Platelets prepare order unit In process             Statement of Approval     Ordered          18 1149  I have reviewed and agree with all the recommendations and orders detailed in this document.  EFFECTIVE NOW     Approved and electronically signed by:  Juan Garcia PA-C             Admission Information     Date & Time Provider Department Dept. Phone    2018 Sander Wan MD [place] [address] [facility] [phone]      Your Vitals Were     Blood Pressure Pulse Temperature Respirations Height Weight    96/64 (BP Location: Left arm) 73 97.9  F (36.6  C) (Oral) 20 1.803 m (5' 11\") 100.4 kg (221 lb 5.5 oz)    Pulse Oximetry BMI (Body Mass Index)                94% 30.87 kg/m2          [employer] Information     [employer] lets you send messages to your doctor, view your test results, renew your prescriptions, schedule appointments and more. To sign up, go to [email or web address] . Click on \"Log in\" on the left side of the screen, which will take you to the Welcome page. Then click on \"Sign up Now\" on the right side of the page.     You will be asked to enter the access code listed below, as well as some personal information. Please follow the directions to create your username and password.     Your access code is: [email or web address]  Expires: 2/10/2019  9:31 AM     Your access code will  in 90 days. If you need help or a new code, please call your [facility] clinic or [phone].        Care EveryWhere ID     This is your Care EveryWhere ID. This could be used by other organizations to access your [facility] medical records  [id number]        Equal Access to Services     MERON AMEZCUA AH: Ruby Hurt, jeanette rubio, qamauricio knight, haja genao " ale clark'aan ah. So [facility] [phone].    ATENCIÓN: Si habla mike, tiene a hood disposición servicios gratuitos de asistencia lingüística. Paige al [phone].    We comply with applicable federal civil rights laws and [place] laws. We do not discriminate on the basis of race, color, national origin, age, disability, sex, sexual orientation, or gender identity.               Review of your medicines      START taking        Dose / Directions    acetaminophen 325 MG tablet   Commonly known as:  TYLENOL   Used for:  Acute post-operative pain        Dose:  325-650 mg   Take 1-2 tablets (325-650 mg) by mouth every 4 hours as needed for mild pain   Quantity:  100 tablet   Refills:  0       amoxicillin-clavulanate 875-125 MG per tablet   Commonly known as:  AUGMENTIN   Indication:  Community Acquired Pneumonia   Used for:  Pneumonia due to infectious organism, unspecified laterality, unspecified part of lung        Dose:  1 tablet   Take 1 tablet by mouth every 12 hours   Quantity:  11 tablet   Refills:  0       aspirin 325 MG EC tablet        Dose:  325 mg   Take 1 tablet (325 mg) by mouth daily   Quantity:  60 tablet   Refills:  0       atorvastatin 40 MG tablet   Commonly known as:  LIPITOR        Dose:  40 mg   Take 1 tablet (40 mg) by mouth every evening   Quantity:  30 tablet   Refills:  0       furosemide 20 MG tablet   Commonly known as:  LASIX   Used for:  Ischemic cardiomyopathy        Dose:  20 mg   Take 1 tablet (20 mg) by mouth 2 times daily   Quantity:  30 tablet   Refills:  0       lisinopril 2.5 MG tablet   Commonly known as:  PRINIVIL/Zestril        Dose:  2.5 mg   Take 1 tablet (2.5 mg) by mouth daily   Quantity:  30 tablet   Refills:  0       methocarbamol 500 MG tablet   Commonly known as:  ROBAXIN   Used for:  Acute post-operative pain        Dose:  500 mg   Take 1 tablet (500 mg) by mouth 4 times daily as needed for muscle spasms   Quantity:  120 tablet   Refills:  0       metoprolol succinate  25 MG 24 hr tablet   Commonly known as:  TOPROL-XL        Dose:  25 mg   Take 1 tablet (25 mg) by mouth daily   Quantity:  30 tablet   Refills:  0       nicotine 14 MG/24HR 24 hr patch   Commonly known as:  NICODERM CQ        Dose:  1 patch   Place 1 patch onto the skin daily   Quantity:  30 patch   Refills:  0       oxyCODONE IR 5 MG tablet   Commonly known as:  ROXICODONE   Used for:  Acute post-operative pain        Dose:  5-10 mg   Take 1-2 tablets (5-10 mg) by mouth every 4 hours as needed for moderate to severe pain   Quantity:  40 tablet   Refills:  0       pantoprazole 40 MG EC tablet   Commonly known as:  PROTONIX        Dose:  40 mg   Start taking on:  11/13/2018   Take 1 tablet (40 mg) by mouth every morning (before breakfast) For 30 days, then discontinue   Quantity:  30 tablet   Refills:  0       polyethylene glycol Packet   Commonly known as:  MIRALAX/GLYCOLAX        Dose:  17 g   Take 17 g by mouth daily   Quantity:  7 packet   Refills:  0       potassium chloride SA 20 MEQ CR tablet   Commonly known as:  K-DUR/KLOR-CON M   Used for:  Ischemic cardiomyopathy        Dose:  20 mEq   Take 1 tablet (20 mEq) by mouth daily   Quantity:  15 tablet   Refills:  0       predniSONE 10 MG tablet   Commonly known as:  DELTASONE        Take 30 mg (3 tabs) PO daily x2 days, then take 20 mg (2 tabs) PO daily x5 days, then take 10 mg (1 tab) PO daily x5 days, then stop   Quantity:  21 tablet   Refills:  0       senna-docusate 8.6-50 MG per tablet   Commonly known as:  SENOKOT-S;PERICOLACE        Dose:  1-2 tablet   Take 1-2 tablets by mouth 2 times daily as needed for constipation   Quantity:  100 tablet   Refills:  0         CONTINUE these medicines which have NOT CHANGED        Dose / Directions    loratadine 10 MG tablet   Commonly known as:  CLARITIN        Dose:  10 mg   Take 10 mg by mouth daily   Refills:  0         STOP taking     naproxen sodium 220 MG capsule                Where to get your medicines       These medications were sent to [facility] [place], [place] - [address]  [address], [place] [address]     Phone:  [phone]     acetaminophen 325 MG tablet    amoxicillin-clavulanate 875-125 MG per tablet    aspirin 325 MG EC tablet    atorvastatin 40 MG tablet    furosemide 20 MG tablet    lisinopril 2.5 MG tablet    methocarbamol 500 MG tablet    metoprolol succinate 25 MG 24 hr tablet    nicotine 14 MG/24HR 24 hr patch    pantoprazole 40 MG EC tablet    polyethylene glycol Packet    potassium chloride SA 20 MEQ CR tablet    predniSONE 10 MG tablet    senna-docusate 8.6-50 MG per tablet         Some of these will need a paper prescription and others can be bought over the counter. Ask your nurse if you have questions.     Bring a paper prescription for each of these medications     oxyCODONE IR 5 MG tablet                Protect others around you: Learn how to safely use, store and throw away your medicines at www.disposemymeds.org.        ANTIBIOTIC INSTRUCTION     You've Been Prescribed an Antibiotic - Now What?  Your healthcare team thinks that you or your loved one might have an infection. Some infections can be treated with antibiotics, which are powerful, life-saving drugs. Like all medications, antibiotics have side effects and should only be used when necessary. There are some important things you should know about your antibiotic treatment.      Your healthcare team may run tests before you start taking an antibiotic.    Your team may take samples (e.g., from your blood, urine or other areas) to run tests to look for bacteria. These test can be important to determine if you need an antibiotic at all and, if you do, which antibiotic will work best.      Within a few days, your healthcare team might change or even stop your antibiotic.    Your team may start you on an antibiotic while they are working to find out what is making you sick.    Your team might change your  antibiotic because test results show that a different antibiotic would be better to treat your infection.    In some cases, once your team has more information, they learn that you do not need an antibiotic at all. They may find out that you don't have an infection, or that the antibiotic you're taking won't work against your infection. For example, an infection caused by a virus can't be treated with antibiotics. Staying on an antibiotic when you don't need it is more likely to be harmful than helpful.      You may experience side effects from your antibiotic.    Like all medications, antibiotics have side effects. Some of these can be serious.    Let you healthcare team know if you have any known allergies when you are admitted to the hospital.    One significant side effect of nearly all antibiotics is the risk of severe and sometimes deadly diarrhea caused by Clostridium difficile (C. Difficile). This occurs when a person takes antibiotics because some good germs are destroyed. Antibiotic use allows C. diificile to take over, putting patients at high risk for this serious infection.    As a patient or caregiver, it is important to understand your or your loved one's antibiotic treatment. It is especially important for caregivers to speak up when patients can't speak for themselves. Here are some important questions to ask your healthcare team.    What infection is this antibiotic treating and how do you know I have that infection?    What side effects might occur from this antibiotic?    How long will I need to take this antibiotic?    Is it safe to take this antibiotic with other medications or supplements (e.g., vitamins) that I am taking?     Are there any special directions I need to know about taking this antibiotic? For example, should I take it with food?    How will I be monitored to know whether my infection is responding to the antibiotic?    What tests may help to make sure the right antibiotic is  prescribed for me?      Information provided by:  www.cdc.gov/getsmart  U.S. Department of Health and Human Services  Centers for disease Control and Prevention  National Center for Emerging and Zoonotic Infectious Diseases  Division of Healthcare Quality Promotion        Information about OPIOIDS     PRESCRIPTION OPIOIDS: WHAT YOU NEED TO KNOW   We gave you an opioid (narcotic) pain medicine. It is important to manage your pain, but opioids are not always the best choice. You should first try all the other options your care team gave you. Take this medicine for as short a time (and as few doses) as possible.    Some activities can increase your pain, such as bandage changes or therapy sessions. It may help to take your pain medicine 30 to 60 minutes before these activities. Reduce your stress by getting enough sleep, working on hobbies you enjoy and practicing relaxation or meditation. Talk to your care team about ways to manage your pain beyond prescription opioids.    These medicines have risks:    DO NOT drive when on new or higher doses of pain medicine. These medicines can affect your alertness and reaction times, and you could be arrested for driving under the influence (DUI). If you need to use opioids long-term, talk to your care team about driving.    DO NOT operate heavy machinery    DO NOT do any other dangerous activities while taking these medicines.    DO NOT drink any alcohol while taking these medicines.     If the opioid prescribed includes acetaminophen, DO NOT take with any other medicines that contain acetaminophen. Read all labels carefully. Look for the word  acetaminophen  or  Tylenol.  Ask your pharmacist if you have questions or are unsure.    You can get addicted to pain medicines, especially if you have a history of addiction (chemical, alcohol or substance dependence). Talk to your care team about ways to reduce this risk.    All opioids tend to cause constipation. Drink plenty of water  and eat foods that have a lot of fiber, such as fruits, vegetables, prune juice, apple juice and high-fiber cereal. Take a laxative (Miralax, milk of magnesia, Colace, Senna) if you don t move your bowels at least every other day. Other side effects include upset stomach, sleepiness, dizziness, throwing up, tolerance (needing more of the medicine to have the same effect), physical dependence and slowed breathing.    Store your pills in a secure place, locked if possible. We will not replace any lost or stolen medicine. If you don t finish your medicine, please throw away (dispose) as directed by your pharmacist. The [place] [employer] has more information about safe disposal: https://www.pca.[email or web address]/living-[email or web address]/[email or web address]-medications             Medication List: This is a list of all your medications and when to take them. Check marks below indicate your daily home schedule. Keep this list as a reference.      Medications           Morning Afternoon Evening Bedtime As Needed    acetaminophen 325 MG tablet   Commonly known as:  TYLENOL   Take 1-2 tablets (325-650 mg) by mouth every 4 hours as needed for mild pain   Last time this was given:  975 mg on 11/11/2018  7:51 PM                                amoxicillin-clavulanate 875-125 MG per tablet   Commonly known as:  AUGMENTIN   Take 1 tablet by mouth every 12 hours   Last time this was given:  1 tablet on 11/12/2018  9:10 AM                                aspirin 325 MG EC tablet   Take 1 tablet (325 mg) by mouth daily   Last time this was given:  81 mg on 11/12/2018  9:09 AM                                atorvastatin 40 MG tablet   Commonly known as:  LIPITOR   Take 1 tablet (40 mg) by mouth every evening   Last time this was given:  40 mg on 11/11/2018  7:51 PM                                furosemide 20 MG tablet   Commonly known as:  LASIX   Take 1 tablet (20 mg) by mouth 2 times daily   Last time this was given:  20 mg on  11/12/2018  9:09 AM                                lisinopril 2.5 MG tablet   Commonly known as:  PRINIVIL/Zestril   Take 1 tablet (2.5 mg) by mouth daily   Last time this was given:  2.5 mg on 11/12/2018  9:09 AM                                loratadine 10 MG tablet   Commonly known as:  CLARITIN   Take 10 mg by mouth daily   Last time this was given:  10 mg on 11/12/2018  9:09 AM                                methocarbamol 500 MG tablet   Commonly known as:  ROBAXIN   Take 1 tablet (500 mg) by mouth 4 times daily as needed for muscle spasms                                metoprolol succinate 25 MG 24 hr tablet   Commonly known as:  TOPROL-XL   Take 1 tablet (25 mg) by mouth daily   Last time this was given:  25 mg on 11/12/2018  9:10 AM                                nicotine 14 MG/24HR 24 hr patch   Commonly known as:  NICODERM CQ   Place 1 patch onto the skin daily   Last time this was given:  1 patch on 11/12/2018  9:07 AM                                oxyCODONE IR 5 MG tablet   Commonly known as:  ROXICODONE   Take 1-2 tablets (5-10 mg) by mouth every 4 hours as needed for moderate to severe pain   Last time this was given:  5 mg on 11/7/2018  9:51 PM                                pantoprazole 40 MG EC tablet   Commonly known as:  PROTONIX   Take 1 tablet (40 mg) by mouth every morning (before breakfast) For 30 days, then discontinue   Start taking on:  11/13/2018   Last time this was given:  40 mg on 11/12/2018  6:50 AM                                polyethylene glycol Packet   Commonly known as:  MIRALAX/GLYCOLAX   Take 17 g by mouth daily   Last time this was given:  17 g on 11/11/2018 10:11 AM                                potassium chloride SA 20 MEQ CR tablet   Commonly known as:  K-DUR/KLOR-CON M   Take 1 tablet (20 mEq) by mouth daily   Last time this was given:  20 mEq on 11/12/2018  9:10 AM                                predniSONE 10 MG tablet   Commonly known as:  DELTASONE   Take 30 mg (3  tabs) PO daily x2 days, then take 20 mg (2 tabs) PO daily x5 days, then take 10 mg (1 tab) PO daily x5 days, then stop   Last time this was given:  30 mg on 11/12/2018  9:11 AM                                senna-docusate 8.6-50 MG per tablet   Commonly known as:  SENOKOT-S;PERICOLACE   Take 1-2 tablets by mouth 2 times daily as needed for constipation   Last time this was given:  2 tablets on 11/11/2018  8:13 AM

## 2018-11-04 NOTE — ED NOTES
Pt with drop in BP down to 80s/40s. Pt denies chest burning. SOB much improved. Pt with no complaints. Dr. Odonnell updated. IV fluids started.

## 2018-11-04 NOTE — H&P
[facility]    History and Physical  Hospitalist       Date of Admission:  11/4/2018  Date of Service (when I saw the patient): 11/04/18    ASSESSMENT  David Richard Schoenecker is a markedly pleasant 68 year old gentleman smoker with untreated dyslipidemia who presents with dyspnea and chest discomfort and is found to have myonecrosis concerning for NSTEMI.    PLAN    1) NSTEMI: Suspected; aortic dissection is being ruled out currently in the ED with CTA. He says he has tried many statins over the years and they have all caused myalgias. He has not taken one for a long time and has not seen a doctor in several years. Now he has progressive dyspnea and chest pain and myonecrosis.      -- Inpatient. Telemetry, serial enzymes, TTE ordered. Cardiology consulted.        -- Heparin infusion and aspirin to be given pending negative CTA; follow up results        -- PRN nebs and nicotine patch ordered; check lipid panel    2) Thrombocytopenia: Cause unclear, could be reactive. Monitor closely while on blood thinners.    Chief Complaint   Dyspnea    History is obtained from the patient, his family at the bedside, and the ED physician whom I have spoken with    History of Present Illness   David Richard Schoenecker is a markedly pleasant 68 year old gentleman who presents with sudden onset dyspnea today while at rest, persistent all evening causing him to come in. He has had associated burning chest discomfort with left shoulder and arm numbness as well. He adds that he thinks the symptoms started about a week ago after he had bene chopping some wood; that day they went away with rest but have been intermittent since with exertion, and then tonight they came on at rest as well. He and his family have been sick with the flu recently by his report. He denies fever, abdominal pain, leg swelling, or any other acute complaints and feels better in the ED after getting a nebulizer.    In the ED, Blood pressure 92/52,  "pulse 125, temperature 99.6  F (37.6  C), temperature source Oral, resp. rate 16, height 1.803 m (5' 11\"), weight 97.1 kg (214 lb), SpO2 92 %.    CBC shows HGB 14, , HGB 14.6. CMP, Lactate, and VBG are unremarkable. A1c 5.9. Troponin 2.42. CXR negative for acute pathology. CTA is pending to rule out acute dissection. He is otherwise to be started on aspirin and Heparin infusion. He also got 60 mg Prednisone.    Data   Labs Ordered and Resulted from Time of ED Arrival Up to the Time of Departure from the ED   COMPREHENSIVE METABOLIC PANEL - Abnormal; Notable for the following:        Result Value    Glucose 132 (*)     All other components within normal limits   CBC WITH PLATELETS DIFFERENTIAL - Abnormal; Notable for the following:     WBC 13.9 (*)     RBC Count 4.22 (*)      (*)     MCH 34.6 (*)     Platelet Count 129 (*)     Absolute Neutrophil 11.9 (*)     Absolute Lymphocytes 0.7 (*)     All other components within normal limits   TROPONIN I - Abnormal; Notable for the following:     Troponin I ES 2.420 (*)     All other components within normal limits   BLOOD GAS VENOUS AND OXYHGB - Abnormal; Notable for the following:     PO2 Venous 18 (*)     All other components within normal limits   ISTAT  GASES LACTATE CHELLY POCT - Abnormal; Notable for the following:     PO2 Venous 24 (*)     All other components within normal limits   PERIPHERAL IV CATHETER   PULSE OXIMETRY NURSING   CARDIAC CONTINUOUS MONITORING   ISTAT CG4 GASES LACTATE CHELLY NURSING POCT       PHYSICAL EXAM  Blood pressure 110/66, pulse 125, temperature 99.6  F (37.6  C), temperature source Oral, resp. rate 28, height 1.803 m (5' 11\"), weight 97.1 kg (214 lb), SpO2 96 %.  Constitutional: Alert and oriented to person, place and time; no apparent distress  HEENT: normocephalic moist mucus membranes  Respiratory: lungs clear to auscultation bilaterally on my auscultation  Cardiovascular: regular S1 S2   GI: abdomen soft non tender non distended " bowel sounds positive  Lymph/Hematologic: no pallor, no cervical lymphadenopathy  Skin: no rash, good turgor  Musculoskeletal: trace bilateral LE edema  Neurologic: extra-ocular muscles intact; moves all four extremities  Psychiatric: appropriate affect, insight and judgment    Data reviewed today:  I personally reviewed the EKG tracing showing sinus tachycardia without ST segment elevations.    Recent Results (from the past 24 hour(s))   Chest XR,  PA & LAT    Narrative    CHEST TWO VIEWS 11/4/2018 4:23 AM     HISTORY: Cough and shortness of breath.     COMPARISON: None.    FINDINGS: Heart size and pulmonary vascularity are within normal  limits. The lungs are clear. No pneumothorax or pleural effusion.       Impression    IMPRESSION: No radiographic evidence of acute chest abnormality.     MONICA BAJWA MD       DVT Prophylaxis: Pneumatic Compression Devices  Code Status: Full Code    Disposition: Expected discharge in 2-3 days    Jerome Rodriguez MD    Primary Care Physician   *Physician No Ref-Primary    Past Medical History    I have reviewed this patient's medical history and updated it with pertinent information if needed.   Past Medical History:   Diagnosis Date     High cholesterol        Past Surgical History   I have reviewed this patient's surgical history and updated it with pertinent information if needed.  Past Surgical History:   Procedure Laterality Date     APPENDECTOMY       ENT SURGERY      tonsillectomy       Prior to Admission Medications   None     Allergies   No Known Allergies    Social History   I have reviewed this patient's social history and updated it with pertinent information if needed. David Richard Schoenecker  reports that he has been smoking.  He has been smoking about 0.75 packs per day. He has never used smokeless tobacco. He reports that he does not drink alcohol or use illicit drugs.    Family History   Family history assessed and, except as above, is  non-contributory.    Family History   Problem Relation Age of Onset     Coronary Artery Disease Mother        Review of Systems   The 10 point Review of Systems is negative other than noted in the HPI or here.

## 2018-11-04 NOTE — PROGRESS NOTES
CT results:     IMPRESSION:  1. No evidence of aortic dissection.  2. Nodular bilateral airspace opacities surrounded by groundglass  opacity. There is associated mediastinal and left hilar adenopathy.  Radiographic findings suggestive of a fungal infection, although  multifocal bacterial pneumonia or metastatic disease can have a  similar appearance.  3. Nonobstructing bilateral nephrolithiasis.  4. Small hiatal hernia.    Given these findings, will add empiric Ceftriaxone and Azithromycin, check sputum cultures, Influenza and pro-calcitonin, and consult ID and Pulmonology for further evaluation.

## 2018-11-04 NOTE — ED NOTES
Bed: ED04  Expected date: 11/4/18  Expected time: 3:15 AM  Means of arrival:   Comments:  HEMS 425  68M  SOB x 1week

## 2018-11-04 NOTE — ED NOTES
1L NS bolus complete. Pt continues to have BPs 70s-90s/40s-50s. HR 80s. Pt continues to deny chest burning. Does c/o discomfort between shoulder blades which is relieved with repositioning. Dr. Odonnell updated and order for another 1L NS bolus received.

## 2018-11-04 NOTE — IP AVS SNAPSHOT
[facility]    [address] [place] [address]    Phone:  [phone]                                       After Visit Summary   11/4/2018    David Richard Schoenecker    MRN: [id number]           After Visit Summary Signature Page     I have received my discharge instructions, and my questions have been answered. I have discussed any challenges I see with this plan with the nurse or doctor.    ..........................................................................................................................................  Patient/Patient Representative Signature      ..........................................................................................................................................  Patient Representative Print Name and Relationship to Patient    ..................................................               ................................................  Date                                   Time    ..........................................................................................................................................  Reviewed by Signature/Title    ...................................................              ..............................................  Date                                               Time          22EPIC Rev 08/18

## 2018-11-04 NOTE — PROGRESS NOTES
"Brief Progress Note    David Schoenecker is a 69yo M with PMH of tobacco use disorder, dyslipidemia, who was admitted 11/4/2018 after presenting with progressive chest pain, and dyspnea, and found to have NSTEMI. He has a markedly elevated troponin of 2.4-->17, new cardiomyopathy with EF 35-40% and WMA. EKG without ST elevation. CTA negative for dissection. He is currently on a heparin drip and has a cardiac catheterization planned for tomorrow. He is also currently being treated for community acquired pneumonia, and possible (new) COPD exacerbation. Procalcitonin is negative. Infectious disease and cardiology are consulted.    Patient feels well today, no complaints currently  Lifelong smoker    BP 91/56 (BP Location: Right arm)  Pulse 96  Temp 98.6  F (37  C) (Oral)  Resp 15  Ht 1.803 m (5' 11\")  Wt 100.1 kg (220 lb 10.9 oz)  SpO2 96%  BMI 30.78 kg/m2  RRR  Lungs CTAB    - Continue ceftriaxone and azithromycin  - Continue steroids, five days total    Chacorta Garcia  Hospitalist  "

## 2018-11-04 NOTE — PLAN OF CARE
Problem: Patient Care Overview  Goal: Plan of Care/Patient Progress Review  Outcome: No Change  BPs soft. Pt. Is asymptomatic. MD aware. Pt. Denies chest pain. Heparin drip at 1000 unit(s)/hr. Plan for Angiogram tomorrow. Continue to monitor and observe closely.    Pt. And daughter watching Angiogram video.

## 2018-11-04 NOTE — PHARMACY-ADMISSION MEDICATION HISTORY
Admission medication history interview status for the 11/4/2018  admission is complete. See EPIC admission navigator for prior to admission medications     Medication history source reliability:Good    Actions taken by pharmacist (provider contacted, etc):None     Additional medication history information not noted on PTA med list :    -added daily aleve and claritin    Medication reconciliation/reorder completed by provider prior to medication history? No    Time spent in this activity: 15 mins    Prior to Admission medications    Medication Sig Last Dose Taking? Auth Provider   loratadine (CLARITIN) 10 MG tablet Take 10 mg by mouth daily  Yes Unknown, Entered By History   naproxen sodium 220 MG capsule Take 220 mg by mouth 2 times daily (with meals)  Yes Unknown, Entered By History

## 2018-11-04 NOTE — PROGRESS NOTES
Patient seen and examined.  Discussed case with Dr. Oliva.  Non-ST elevation microinfarction.  Troponin increased and now stabilizing at 17.  Was 13 about 4 hours earlier.  Patient is pain-free and very comfortable.  He is laying flat without any shortness of breath or chest pain.  Her blood pressures are in the low 90s to low 100s but not in heart failure.  Peripheries are warm.  Echo revealed EF of 30-35% with wall motion abnormalities  I discussed the case with the patient and his daughter.  If there is recurrent chest pain or  hemodynamic instability, we will have low threshold to take him to the cardiac Cath Lab.  Continue aspirin and IV heparin.  If he has more pain, start Integrilin and taken to the Cath Lab.  I have discussed the case with Dr. Ahmet Cedillo who is aware of the patient and will come in for emergent angiography if need be.

## 2018-11-04 NOTE — ED NOTES
[facility]  ED Nurse Handoff Report    ED Chief complaint: Shortness of Breath (SOB for 1 week. EMS report O2 sat upper 80s on RA. Audible exp wheezes. Gave 1 duoneb.)      ED Diagnosis:   Final diagnoses:   None       Code Status: Full Code    Allergies: No Known Allergies    Activity level - Baseline/Home:  Independent    Activity Level - Current:   Pt has not been up in the ED d/t SOB.     Needed?: No    Isolation: No  Infection: Not Applicable  Bariatric?: No    Vital Signs:   Vitals:    11/04/18 0320   BP: 111/55   Pulse: 125   Resp: 28   Temp: 99.6  F (37.6  C)   TempSrc: Oral   SpO2: 97%       Cardiac Rhythm: ,   Cardiac  Cardiac Rhythm: Sinus tachycardia    Pain level: 0-10 Pain Scale: 0    Is this patient confused?: No   [place] - Suicide Severity Rating Scale Completed?  Yes  If yes, what color did the patient score?  White    Patient Report: Initial Complaint: Pt presents via EMS for SOB. Pt reports SOB and intermittent chest burning with activity for last week. 1 duoneb given by EMS  Focused Assessment: Pt with audible expiratory wheezes. RR 28. SOB. T 99.6 Also c/o intermittent left arm burning. Breathing improved after additional duoneb received in ED.  Tests Performed: labs and chest x-ray. EKG ST  Abnormal Results: troponin 2.42  Treatments provided: ASA 324mg; 2 duonebs total    Family Comments: Wife and family present and supportive.    OBS brochure/video discussed/provided to patient/family: N/A              Name of person given brochure if not patient: N/A              Relationship to patient: N/A    ED Medications:   Medications   lidocaine 1 % 1 mL (not administered)   lidocaine (LMX4) cream (not administered)   sodium chloride (PF) 0.9% PF flush 3 mL (not administered)   sodium chloride (PF) 0.9% PF flush 3 mL (not administered)   0.9% sodium chloride BOLUS (1,000 mLs Intravenous New Bag 11/4/18 0406)   ipratropium - albuterol 0.5 mg/2.5 mg/3 mL (DUONEB) neb  solution 3 mL (3 mLs Nebulization Given 11/4/18 6059)   predniSONE (DELTASONE) tablet 60 mg (60 mg Oral Given 11/4/18 4545)   aspirin chewable tablet 324 mg (324 mg Oral Given 11/4/18 0790)       Drips infusing?:  No    For the majority of the shift this patient was Green.   Interventions performed were reassurance and information.    Severe Sepsis OR Septic Shock Diagnosis Present: No    To be done/followed up on inpatient unit:  None pending.    ED NURSE PHONE NUMBER: [phone]

## 2018-11-04 NOTE — ED NOTES
DATE:  11/4/2018   TIME OF RECEIPT FROM LAB: 0410  LAB TEST: Troponin  LAB VALUE:   2.42  RESULTS GIVEN WITH READ-BACK TO (PROVIDER): Dr. Odonnell   TIME LAB VALUE REPORTED TO PROVIDER:   0413

## 2018-11-05 ENCOUNTER — APPOINTMENT (OUTPATIENT)
Dept: ULTRASOUND IMAGING | Facility: CLINIC | Age: 68
DRG: 233 | End: 2018-11-05
Attending: SURGERY
Payer: COMMERCIAL

## 2018-11-05 ENCOUNTER — APPOINTMENT (OUTPATIENT)
Dept: CARDIOLOGY | Facility: CLINIC | Age: 68
DRG: 233 | End: 2018-11-05
Attending: NURSE PRACTITIONER
Payer: COMMERCIAL

## 2018-11-05 ENCOUNTER — ANESTHESIA EVENT (OUTPATIENT)
Dept: SURGERY | Facility: CLINIC | Age: 68
DRG: 233 | End: 2018-11-05
Payer: COMMERCIAL

## 2018-11-05 LAB
ABO + RH BLD: NORMAL
ABO + RH BLD: NORMAL
ALBUMIN UR-MCNC: 30 MG/DL
APPEARANCE UR: CLEAR
BILIRUB UR QL STRIP: NEGATIVE
BLD GP AB SCN SERPL QL: NORMAL
BLOOD BANK CMNT PATIENT-IMP: NORMAL
CHOLEST SERPL-MCNC: 261 MG/DL
COLOR UR AUTO: YELLOW
GLUCOSE BLDC GLUCOMTR-MCNC: 110 MG/DL (ref 70–99)
GLUCOSE BLDC GLUCOMTR-MCNC: 118 MG/DL (ref 70–99)
GLUCOSE UR STRIP-MCNC: NEGATIVE MG/DL
HDLC SERPL-MCNC: 36 MG/DL
HGB UR QL STRIP: NEGATIVE
KETONES UR STRIP-MCNC: 40 MG/DL
LACTATE BLD-SCNC: 2.3 MMOL/L (ref 0.7–2)
LDLC SERPL CALC-MCNC: 199 MG/DL
LEUKOCYTE ESTERASE UR QL STRIP: ABNORMAL
LMWH PPP CHRO-ACNC: 0.75 IU/ML
LMWH PPP CHRO-ACNC: <0.1 IU/ML
MUCOUS THREADS #/AREA URNS LPF: PRESENT /LPF
NITRATE UR QL: NEGATIVE
NONHDLC SERPL-MCNC: 225 MG/DL
NUM BPU REQUESTED: 4
PH UR STRIP: 6 PH (ref 5–7)
POTASSIUM SERPL-SCNC: 4.2 MMOL/L (ref 3.4–5.3)
RBC #/AREA URNS AUTO: 2 /HPF (ref 0–2)
SOURCE: ABNORMAL
SP GR UR STRIP: 1.05 (ref 1–1.03)
SPECIMEN EXP DATE BLD: NORMAL
TRIGL SERPL-MCNC: 128 MG/DL
UROBILINOGEN UR STRIP-MCNC: NORMAL MG/DL (ref 0–2)
WBC #/AREA URNS AUTO: 5 /HPF (ref 0–5)

## 2018-11-05 PROCEDURE — 85520 HEPARIN ASSAY: CPT | Performed by: HOSPITALIST

## 2018-11-05 PROCEDURE — 94640 AIRWAY INHALATION TREATMENT: CPT | Mod: 76

## 2018-11-05 PROCEDURE — 25000128 H RX IP 250 OP 636: Performed by: INTERNAL MEDICINE

## 2018-11-05 PROCEDURE — 85520 HEPARIN ASSAY: CPT | Performed by: INTERNAL MEDICINE

## 2018-11-05 PROCEDURE — 00000146 ZZHCL STATISTIC GLUCOSE BY METER IP

## 2018-11-05 PROCEDURE — 87641 MR-STAPH DNA AMP PROBE: CPT | Performed by: SURGERY

## 2018-11-05 PROCEDURE — 27210914 ZZH SHEATH CR8

## 2018-11-05 PROCEDURE — 86923 COMPATIBILITY TEST ELECTRIC: CPT | Performed by: INTERNAL MEDICINE

## 2018-11-05 PROCEDURE — 27210856 ZZH ACCESS HEART CATH CR2

## 2018-11-05 PROCEDURE — 93971 EXTREMITY STUDY: CPT | Mod: LT

## 2018-11-05 PROCEDURE — 99152 MOD SED SAME PHYS/QHP 5/>YRS: CPT | Performed by: INTERNAL MEDICINE

## 2018-11-05 PROCEDURE — 84132 ASSAY OF SERUM POTASSIUM: CPT | Performed by: HOSPITALIST

## 2018-11-05 PROCEDURE — 27211089 ZZH KIT ACIST INJECTOR CR3

## 2018-11-05 PROCEDURE — 27210892 ZZH CATH CR4

## 2018-11-05 PROCEDURE — 93454 CORONARY ARTERY ANGIO S&I: CPT | Mod: 26 | Performed by: INTERNAL MEDICINE

## 2018-11-05 PROCEDURE — 99232 SBSQ HOSP IP/OBS MODERATE 35: CPT | Performed by: INTERNAL MEDICINE

## 2018-11-05 PROCEDURE — 27210946 ZZH KIT HC TOTES DISP CR8

## 2018-11-05 PROCEDURE — 27210795 ZZH PAD DEFIB QUICK CR4

## 2018-11-05 PROCEDURE — 85520 HEPARIN ASSAY: CPT | Performed by: SURGERY

## 2018-11-05 PROCEDURE — 99233 SBSQ HOSP IP/OBS HIGH 50: CPT | Mod: 25 | Performed by: INTERNAL MEDICINE

## 2018-11-05 PROCEDURE — 40000274 ZZH STATISTIC RCP CONSULT EA 30 MIN

## 2018-11-05 PROCEDURE — 36415 COLL VENOUS BLD VENIPUNCTURE: CPT | Performed by: INTERNAL MEDICINE

## 2018-11-05 PROCEDURE — 83605 ASSAY OF LACTIC ACID: CPT | Performed by: INTERNAL MEDICINE

## 2018-11-05 PROCEDURE — 86850 RBC ANTIBODY SCREEN: CPT | Performed by: INTERNAL MEDICINE

## 2018-11-05 PROCEDURE — 81001 URINALYSIS AUTO W/SCOPE: CPT | Performed by: SURGERY

## 2018-11-05 PROCEDURE — 36415 COLL VENOUS BLD VENIPUNCTURE: CPT | Performed by: HOSPITALIST

## 2018-11-05 PROCEDURE — 25000125 ZZHC RX 250: Performed by: SURGERY

## 2018-11-05 PROCEDURE — 25000132 ZZH RX MED GY IP 250 OP 250 PS 637: Performed by: INTERNAL MEDICINE

## 2018-11-05 PROCEDURE — 99152 MOD SED SAME PHYS/QHP 5/>YRS: CPT

## 2018-11-05 PROCEDURE — 25000125 ZZHC RX 250: Performed by: INTERNAL MEDICINE

## 2018-11-05 PROCEDURE — 5A02210 ASSISTANCE WITH CARDIAC OUTPUT USING BALLOON PUMP, CONTINUOUS: ICD-10-PCS | Performed by: INTERNAL MEDICINE

## 2018-11-05 PROCEDURE — 25000132 ZZH RX MED GY IP 250 OP 250 PS 637: Performed by: NURSE PRACTITIONER

## 2018-11-05 PROCEDURE — 20000003 ZZH R&B ICU

## 2018-11-05 PROCEDURE — 93880 EXTRACRANIAL BILAT STUDY: CPT

## 2018-11-05 PROCEDURE — 99153 MOD SED SAME PHYS/QHP EA: CPT

## 2018-11-05 PROCEDURE — 86900 BLOOD TYPING SEROLOGIC ABO: CPT | Performed by: INTERNAL MEDICINE

## 2018-11-05 PROCEDURE — 27210848 ZZH CATH IABP PUMP CR18

## 2018-11-05 PROCEDURE — 86901 BLOOD TYPING SEROLOGIC RH(D): CPT | Performed by: INTERNAL MEDICINE

## 2018-11-05 PROCEDURE — B2111ZZ FLUOROSCOPY OF MULTIPLE CORONARY ARTERIES USING LOW OSMOLAR CONTRAST: ICD-10-PCS | Performed by: INTERNAL MEDICINE

## 2018-11-05 PROCEDURE — 25000132 ZZH RX MED GY IP 250 OP 250 PS 637: Performed by: SURGERY

## 2018-11-05 PROCEDURE — 80061 LIPID PANEL: CPT | Performed by: HOSPITALIST

## 2018-11-05 PROCEDURE — 27210787 ZZH MANIFOLD CR2

## 2018-11-05 PROCEDURE — 84132 ASSAY OF SERUM POTASSIUM: CPT | Performed by: INTERNAL MEDICINE

## 2018-11-05 PROCEDURE — 40000275 ZZH STATISTIC RCP TIME EA 10 MIN

## 2018-11-05 PROCEDURE — 33967 INSERT I-AORT PERCUT DEVICE: CPT | Performed by: INTERNAL MEDICINE

## 2018-11-05 PROCEDURE — 4A023N7 MEASUREMENT OF CARDIAC SAMPLING AND PRESSURE, LEFT HEART, PERCUTANEOUS APPROACH: ICD-10-PCS | Performed by: INTERNAL MEDICINE

## 2018-11-05 PROCEDURE — 93458 L HRT ARTERY/VENTRICLE ANGIO: CPT

## 2018-11-05 PROCEDURE — 25000128 H RX IP 250 OP 636: Performed by: HOSPITALIST

## 2018-11-05 PROCEDURE — 25000125 ZZHC RX 250: Performed by: HOSPITALIST

## 2018-11-05 PROCEDURE — 87640 STAPH A DNA AMP PROBE: CPT | Performed by: SURGERY

## 2018-11-05 PROCEDURE — 33967 INSERT I-AORT PERCUT DEVICE: CPT

## 2018-11-05 RX ORDER — FLUMAZENIL 0.1 MG/ML
0.2 INJECTION, SOLUTION INTRAVENOUS
Status: DISCONTINUED | OUTPATIENT
Start: 2018-11-05 | End: 2018-11-06

## 2018-11-05 RX ORDER — CLOPIDOGREL 300 MG/1
300-600 TABLET, FILM COATED ORAL
Status: DISCONTINUED | OUTPATIENT
Start: 2018-11-05 | End: 2018-11-05 | Stop reason: HOSPADM

## 2018-11-05 RX ORDER — LORAZEPAM 0.5 MG/1
0.5 TABLET ORAL
Status: DISCONTINUED | OUTPATIENT
Start: 2018-11-05 | End: 2018-11-05

## 2018-11-05 RX ORDER — PHENYLEPHRINE HCL IN 0.9% NACL 1 MG/10 ML
20-100 SYRINGE (ML) INTRAVENOUS
Status: DISCONTINUED | OUTPATIENT
Start: 2018-11-05 | End: 2018-11-05 | Stop reason: HOSPADM

## 2018-11-05 RX ORDER — NALOXONE HYDROCHLORIDE 0.4 MG/ML
.1-.4 INJECTION, SOLUTION INTRAMUSCULAR; INTRAVENOUS; SUBCUTANEOUS
Status: DISCONTINUED | OUTPATIENT
Start: 2018-11-05 | End: 2018-11-05

## 2018-11-05 RX ORDER — LIDOCAINE HYDROCHLORIDE 10 MG/ML
30 INJECTION, SOLUTION EPIDURAL; INFILTRATION; INTRACAUDAL; PERINEURAL
Status: DISCONTINUED | OUTPATIENT
Start: 2018-11-05 | End: 2018-11-05 | Stop reason: HOSPADM

## 2018-11-05 RX ORDER — ADENOSINE 3 MG/ML
12-12000 INJECTION, SOLUTION INTRAVENOUS
Status: DISCONTINUED | OUTPATIENT
Start: 2018-11-05 | End: 2018-11-05 | Stop reason: HOSPADM

## 2018-11-05 RX ORDER — FENTANYL CITRATE 50 UG/ML
25-50 INJECTION, SOLUTION INTRAMUSCULAR; INTRAVENOUS
Status: DISCONTINUED | OUTPATIENT
Start: 2018-11-05 | End: 2018-11-05 | Stop reason: HOSPADM

## 2018-11-05 RX ORDER — LIDOCAINE HYDROCHLORIDE 10 MG/ML
1-10 INJECTION, SOLUTION EPIDURAL; INFILTRATION; INTRACAUDAL; PERINEURAL
Status: COMPLETED | OUTPATIENT
Start: 2018-11-05 | End: 2018-11-05

## 2018-11-05 RX ORDER — HYDROCODONE BITARTRATE AND ACETAMINOPHEN 5; 325 MG/1; MG/1
1-2 TABLET ORAL EVERY 4 HOURS PRN
Status: DISCONTINUED | OUTPATIENT
Start: 2018-11-05 | End: 2018-11-06

## 2018-11-05 RX ORDER — ASPIRIN 81 MG/1
81 TABLET ORAL DAILY
Status: DISCONTINUED | OUTPATIENT
Start: 2018-11-06 | End: 2018-11-05 | Stop reason: HOSPADM

## 2018-11-05 RX ORDER — LIDOCAINE 40 MG/G
CREAM TOPICAL
Status: DISCONTINUED | OUTPATIENT
Start: 2018-11-05 | End: 2018-11-05

## 2018-11-05 RX ORDER — POTASSIUM CHLORIDE 1500 MG/1
20 TABLET, EXTENDED RELEASE ORAL
Status: DISCONTINUED | OUTPATIENT
Start: 2018-11-05 | End: 2018-11-05 | Stop reason: HOSPADM

## 2018-11-05 RX ORDER — NITROGLYCERIN 20 MG/100ML
.07-2 INJECTION INTRAVENOUS CONTINUOUS PRN
Status: DISCONTINUED | OUTPATIENT
Start: 2018-11-05 | End: 2018-11-05 | Stop reason: HOSPADM

## 2018-11-05 RX ORDER — METOPROLOL TARTRATE 1 MG/ML
5 INJECTION, SOLUTION INTRAVENOUS EVERY 5 MIN PRN
Status: DISCONTINUED | OUTPATIENT
Start: 2018-11-05 | End: 2018-11-05 | Stop reason: HOSPADM

## 2018-11-05 RX ORDER — NIFEDIPINE 10 MG/1
10 CAPSULE ORAL
Status: DISCONTINUED | OUTPATIENT
Start: 2018-11-05 | End: 2018-11-05 | Stop reason: HOSPADM

## 2018-11-05 RX ORDER — PROTAMINE SULFATE 10 MG/ML
25-100 INJECTION, SOLUTION INTRAVENOUS EVERY 5 MIN PRN
Status: DISCONTINUED | OUTPATIENT
Start: 2018-11-05 | End: 2018-11-05 | Stop reason: HOSPADM

## 2018-11-05 RX ORDER — PROTAMINE SULFATE 10 MG/ML
1-5 INJECTION, SOLUTION INTRAVENOUS
Status: DISCONTINUED | OUTPATIENT
Start: 2018-11-05 | End: 2018-11-05 | Stop reason: HOSPADM

## 2018-11-05 RX ORDER — DIPHENHYDRAMINE HYDROCHLORIDE 50 MG/ML
25-50 INJECTION INTRAMUSCULAR; INTRAVENOUS
Status: DISCONTINUED | OUTPATIENT
Start: 2018-11-05 | End: 2018-11-05 | Stop reason: HOSPADM

## 2018-11-05 RX ORDER — ATROPINE SULFATE 0.1 MG/ML
0.5 INJECTION INTRAVENOUS EVERY 5 MIN PRN
Status: DISCONTINUED | OUTPATIENT
Start: 2018-11-05 | End: 2018-11-06

## 2018-11-05 RX ORDER — CEFAZOLIN SODIUM 2 G/100ML
2 INJECTION, SOLUTION INTRAVENOUS
Status: COMPLETED | OUTPATIENT
Start: 2018-11-06 | End: 2018-11-06

## 2018-11-05 RX ORDER — SODIUM CHLORIDE 9 MG/ML
INJECTION, SOLUTION INTRAVENOUS CONTINUOUS
Status: DISCONTINUED | OUTPATIENT
Start: 2018-11-05 | End: 2018-11-05

## 2018-11-05 RX ORDER — ACETAMINOPHEN 325 MG/1
325-650 TABLET ORAL EVERY 4 HOURS PRN
Status: DISCONTINUED | OUTPATIENT
Start: 2018-11-05 | End: 2018-11-06

## 2018-11-05 RX ORDER — LIDOCAINE 40 MG/G
CREAM TOPICAL
Status: DISCONTINUED | OUTPATIENT
Start: 2018-11-05 | End: 2018-11-06

## 2018-11-05 RX ORDER — ASPIRIN 81 MG/1
81-324 TABLET, CHEWABLE ORAL
Status: DISCONTINUED | OUTPATIENT
Start: 2018-11-05 | End: 2018-11-05 | Stop reason: HOSPADM

## 2018-11-05 RX ORDER — LORAZEPAM 0.5 MG/1
0.5 TABLET ORAL
Status: DISCONTINUED | OUTPATIENT
Start: 2018-11-05 | End: 2018-11-05 | Stop reason: HOSPADM

## 2018-11-05 RX ORDER — NALOXONE HYDROCHLORIDE 0.4 MG/ML
0.4 INJECTION, SOLUTION INTRAMUSCULAR; INTRAVENOUS; SUBCUTANEOUS EVERY 5 MIN PRN
Status: DISCONTINUED | OUTPATIENT
Start: 2018-11-05 | End: 2018-11-05 | Stop reason: HOSPADM

## 2018-11-05 RX ORDER — POTASSIUM CHLORIDE 29.8 MG/ML
20 INJECTION INTRAVENOUS
Status: DISCONTINUED | OUTPATIENT
Start: 2018-11-05 | End: 2018-11-05 | Stop reason: HOSPADM

## 2018-11-05 RX ORDER — MORPHINE SULFATE 2 MG/ML
1-2 INJECTION, SOLUTION INTRAMUSCULAR; INTRAVENOUS EVERY 5 MIN PRN
Status: DISCONTINUED | OUTPATIENT
Start: 2018-11-05 | End: 2018-11-05 | Stop reason: HOSPADM

## 2018-11-05 RX ORDER — MUPIROCIN 20 MG/G
OINTMENT TOPICAL 2 TIMES DAILY
Status: DISCONTINUED | OUTPATIENT
Start: 2018-11-05 | End: 2018-11-06 | Stop reason: HOSPADM

## 2018-11-05 RX ORDER — LORAZEPAM 2 MG/ML
.5-2 INJECTION INTRAMUSCULAR EVERY 4 HOURS PRN
Status: DISCONTINUED | OUTPATIENT
Start: 2018-11-05 | End: 2018-11-05 | Stop reason: HOSPADM

## 2018-11-05 RX ORDER — METHYLPREDNISOLONE SODIUM SUCCINATE 125 MG/2ML
125 INJECTION, POWDER, LYOPHILIZED, FOR SOLUTION INTRAMUSCULAR; INTRAVENOUS
Status: DISCONTINUED | OUTPATIENT
Start: 2018-11-05 | End: 2018-11-05 | Stop reason: HOSPADM

## 2018-11-05 RX ORDER — CEFAZOLIN SODIUM 1 G/3ML
1 INJECTION, POWDER, FOR SOLUTION INTRAMUSCULAR; INTRAVENOUS SEE ADMIN INSTRUCTIONS
Status: DISCONTINUED | OUTPATIENT
Start: 2018-11-06 | End: 2018-11-06 | Stop reason: HOSPADM

## 2018-11-05 RX ORDER — ROSUVASTATIN CALCIUM 20 MG/1
40 TABLET, COATED ORAL DAILY
Status: DISCONTINUED | OUTPATIENT
Start: 2018-11-05 | End: 2018-11-08

## 2018-11-05 RX ORDER — NITROGLYCERIN 5 MG/ML
100-500 VIAL (ML) INTRAVENOUS
Status: COMPLETED | OUTPATIENT
Start: 2018-11-05 | End: 2018-11-05

## 2018-11-05 RX ORDER — FLUMAZENIL 0.1 MG/ML
0.2 INJECTION, SOLUTION INTRAVENOUS
Status: DISCONTINUED | OUTPATIENT
Start: 2018-11-05 | End: 2018-11-05 | Stop reason: HOSPADM

## 2018-11-05 RX ORDER — SODIUM CHLORIDE 9 MG/ML
INJECTION, SOLUTION INTRAVENOUS CONTINUOUS
Status: DISCONTINUED | OUTPATIENT
Start: 2018-11-05 | End: 2018-11-05 | Stop reason: HOSPADM

## 2018-11-05 RX ORDER — DEXTROSE MONOHYDRATE 25 G/50ML
12.5-5 INJECTION, SOLUTION INTRAVENOUS EVERY 30 MIN PRN
Status: DISCONTINUED | OUTPATIENT
Start: 2018-11-05 | End: 2018-11-05 | Stop reason: HOSPADM

## 2018-11-05 RX ORDER — LORAZEPAM 2 MG/ML
0.5 INJECTION INTRAMUSCULAR
Status: DISCONTINUED | OUTPATIENT
Start: 2018-11-05 | End: 2018-11-05

## 2018-11-05 RX ORDER — LORAZEPAM 0.5 MG/1
0.5 TABLET ORAL AT BEDTIME
Status: COMPLETED | OUTPATIENT
Start: 2018-11-05 | End: 2018-11-05

## 2018-11-05 RX ORDER — ONDANSETRON 2 MG/ML
4 INJECTION INTRAMUSCULAR; INTRAVENOUS EVERY 4 HOURS PRN
Status: DISCONTINUED | OUTPATIENT
Start: 2018-11-05 | End: 2018-11-05 | Stop reason: HOSPADM

## 2018-11-05 RX ORDER — LISINOPRIL 2.5 MG/1
2.5 TABLET ORAL DAILY
Status: DISCONTINUED | OUTPATIENT
Start: 2018-11-05 | End: 2018-11-06

## 2018-11-05 RX ORDER — PRASUGREL 10 MG/1
10-60 TABLET, FILM COATED ORAL
Status: DISCONTINUED | OUTPATIENT
Start: 2018-11-05 | End: 2018-11-05 | Stop reason: HOSPADM

## 2018-11-05 RX ORDER — NITROGLYCERIN 5 MG/ML
100-200 VIAL (ML) INTRAVENOUS
Status: DISCONTINUED | OUTPATIENT
Start: 2018-11-05 | End: 2018-11-05 | Stop reason: HOSPADM

## 2018-11-05 RX ORDER — HEPARIN SODIUM 1000 [USP'U]/ML
1000-10000 INJECTION, SOLUTION INTRAVENOUS; SUBCUTANEOUS EVERY 5 MIN PRN
Status: DISCONTINUED | OUTPATIENT
Start: 2018-11-05 | End: 2018-11-05 | Stop reason: HOSPADM

## 2018-11-05 RX ORDER — LORAZEPAM 2 MG/ML
0.5 INJECTION INTRAMUSCULAR
Status: DISCONTINUED | OUTPATIENT
Start: 2018-11-05 | End: 2018-11-05 | Stop reason: HOSPADM

## 2018-11-05 RX ORDER — POTASSIUM CHLORIDE 7.45 MG/ML
10 INJECTION INTRAVENOUS
Status: DISCONTINUED | OUTPATIENT
Start: 2018-11-05 | End: 2018-11-05 | Stop reason: HOSPADM

## 2018-11-05 RX ORDER — CLOPIDOGREL BISULFATE 75 MG/1
75 TABLET ORAL
Status: DISCONTINUED | OUTPATIENT
Start: 2018-11-05 | End: 2018-11-05 | Stop reason: HOSPADM

## 2018-11-05 RX ORDER — FENTANYL CITRATE 50 UG/ML
25-50 INJECTION, SOLUTION INTRAMUSCULAR; INTRAVENOUS
Status: DISCONTINUED | OUTPATIENT
Start: 2018-11-05 | End: 2018-11-06

## 2018-11-05 RX ORDER — BUPIVACAINE HYDROCHLORIDE 2.5 MG/ML
1-10 INJECTION, SOLUTION EPIDURAL; INFILTRATION; INTRACAUDAL
Status: DISCONTINUED | OUTPATIENT
Start: 2018-11-05 | End: 2018-11-05 | Stop reason: HOSPADM

## 2018-11-05 RX ORDER — DOBUTAMINE HYDROCHLORIDE 200 MG/100ML
2-20 INJECTION INTRAVENOUS CONTINUOUS PRN
Status: DISCONTINUED | OUTPATIENT
Start: 2018-11-05 | End: 2018-11-05 | Stop reason: HOSPADM

## 2018-11-05 RX ORDER — NICARDIPINE HYDROCHLORIDE 2.5 MG/ML
100 INJECTION INTRAVENOUS
Status: DISCONTINUED | OUTPATIENT
Start: 2018-11-05 | End: 2018-11-05 | Stop reason: HOSPADM

## 2018-11-05 RX ORDER — HYDRALAZINE HYDROCHLORIDE 20 MG/ML
10-20 INJECTION INTRAMUSCULAR; INTRAVENOUS
Status: DISCONTINUED | OUTPATIENT
Start: 2018-11-05 | End: 2018-11-05 | Stop reason: HOSPADM

## 2018-11-05 RX ORDER — DOPAMINE HYDROCHLORIDE 160 MG/100ML
2-20 INJECTION, SOLUTION INTRAVENOUS CONTINUOUS PRN
Status: DISCONTINUED | OUTPATIENT
Start: 2018-11-05 | End: 2018-11-05 | Stop reason: HOSPADM

## 2018-11-05 RX ORDER — FUROSEMIDE 10 MG/ML
20-100 INJECTION INTRAMUSCULAR; INTRAVENOUS
Status: DISCONTINUED | OUTPATIENT
Start: 2018-11-05 | End: 2018-11-05 | Stop reason: HOSPADM

## 2018-11-05 RX ORDER — ENALAPRILAT 1.25 MG/ML
1.25-2.5 INJECTION INTRAVENOUS
Status: DISCONTINUED | OUTPATIENT
Start: 2018-11-05 | End: 2018-11-05 | Stop reason: HOSPADM

## 2018-11-05 RX ORDER — VERAPAMIL HYDROCHLORIDE 2.5 MG/ML
1-2.5 INJECTION, SOLUTION INTRAVENOUS
Status: COMPLETED | OUTPATIENT
Start: 2018-11-05 | End: 2018-11-05

## 2018-11-05 RX ORDER — LIDOCAINE 40 MG/G
CREAM TOPICAL
Status: DISCONTINUED | OUTPATIENT
Start: 2018-11-05 | End: 2018-11-05 | Stop reason: HOSPADM

## 2018-11-05 RX ORDER — NALOXONE HYDROCHLORIDE 0.4 MG/ML
.2-.4 INJECTION, SOLUTION INTRAMUSCULAR; INTRAVENOUS; SUBCUTANEOUS
Status: DISCONTINUED | OUTPATIENT
Start: 2018-11-05 | End: 2018-11-06

## 2018-11-05 RX ORDER — EPINEPHRINE 1 MG/ML
0.3 INJECTION, SOLUTION, CONCENTRATE INTRAVENOUS
Status: DISCONTINUED | OUTPATIENT
Start: 2018-11-05 | End: 2018-11-05 | Stop reason: HOSPADM

## 2018-11-05 RX ORDER — METOPROLOL TARTRATE 25 MG/1
25 TABLET, FILM COATED ORAL
Status: COMPLETED | OUTPATIENT
Start: 2018-11-06 | End: 2018-11-06

## 2018-11-05 RX ORDER — ASPIRIN 81 MG/1
81 TABLET ORAL DAILY
Status: DISCONTINUED | OUTPATIENT
Start: 2018-11-06 | End: 2018-11-06

## 2018-11-05 RX ORDER — NITROGLYCERIN 0.4 MG/1
0.4 TABLET SUBLINGUAL EVERY 5 MIN PRN
Status: DISCONTINUED | OUTPATIENT
Start: 2018-11-05 | End: 2018-11-05 | Stop reason: HOSPADM

## 2018-11-05 RX ORDER — IOPAMIDOL 755 MG/ML
30 INJECTION, SOLUTION INTRAVASCULAR ONCE
Status: COMPLETED | OUTPATIENT
Start: 2018-11-05 | End: 2018-11-05

## 2018-11-05 RX ORDER — ASPIRIN 325 MG
325 TABLET ORAL
Status: DISCONTINUED | OUTPATIENT
Start: 2018-11-05 | End: 2018-11-05 | Stop reason: HOSPADM

## 2018-11-05 RX ORDER — SODIUM CHLORIDE 9 MG/ML
INJECTION, SOLUTION INTRAVENOUS CONTINUOUS
Status: DISCONTINUED | OUTPATIENT
Start: 2018-11-05 | End: 2018-11-06

## 2018-11-05 RX ORDER — ATROPINE SULFATE 0.1 MG/ML
.5-1 INJECTION INTRAVENOUS
Status: DISCONTINUED | OUTPATIENT
Start: 2018-11-05 | End: 2018-11-05 | Stop reason: HOSPADM

## 2018-11-05 RX ORDER — SODIUM NITROPRUSSIDE 25 MG/ML
100-200 INJECTION INTRAVENOUS
Status: DISCONTINUED | OUTPATIENT
Start: 2018-11-05 | End: 2018-11-05 | Stop reason: HOSPADM

## 2018-11-05 RX ADMIN — VERAPAMIL HYDROCHLORIDE 2.5 MG: 2.5 INJECTION, SOLUTION INTRAVENOUS at 15:37

## 2018-11-05 RX ADMIN — MIDAZOLAM 1 MG: 1 INJECTION INTRAMUSCULAR; INTRAVENOUS at 15:32

## 2018-11-05 RX ADMIN — FENTANYL CITRATE 50 MCG: 50 INJECTION, SOLUTION INTRAMUSCULAR; INTRAVENOUS at 15:59

## 2018-11-05 RX ADMIN — SODIUM CHLORIDE 500 ML: 9 INJECTION, SOLUTION INTRAVENOUS at 09:57

## 2018-11-05 RX ADMIN — SODIUM CHLORIDE 150 ML/HR: 9 INJECTION, SOLUTION INTRAVENOUS at 08:21

## 2018-11-05 RX ADMIN — Medication 0.2 MG: at 17:22

## 2018-11-05 RX ADMIN — AZITHROMYCIN MONOHYDRATE 250 MG: 500 INJECTION, POWDER, LYOPHILIZED, FOR SOLUTION INTRAVENOUS at 06:08

## 2018-11-05 RX ADMIN — MIDAZOLAM 1 MG: 1 INJECTION INTRAMUSCULAR; INTRAVENOUS at 15:42

## 2018-11-05 RX ADMIN — LISINOPRIL 2.5 MG: 2.5 TABLET ORAL at 18:31

## 2018-11-05 RX ADMIN — IOPAMIDOL 30 ML: 755 INJECTION, SOLUTION INTRAVASCULAR at 16:15

## 2018-11-05 RX ADMIN — HEPARIN SODIUM 1350 UNITS/HR: 10000 INJECTION, SOLUTION INTRAVENOUS at 17:28

## 2018-11-05 RX ADMIN — IPRATROPIUM BROMIDE AND ALBUTEROL SULFATE 3 ML: 2.5; .5 SOLUTION RESPIRATORY (INHALATION) at 04:46

## 2018-11-05 RX ADMIN — NITROGLYCERIN 200 MCG: 5 INJECTION, SOLUTION INTRAVENOUS at 15:36

## 2018-11-05 RX ADMIN — ROSUVASTATIN CALCIUM 40 MG: 20 TABLET, FILM COATED ORAL at 21:12

## 2018-11-05 RX ADMIN — FENTANYL CITRATE 50 MCG: 50 INJECTION, SOLUTION INTRAMUSCULAR; INTRAVENOUS at 15:32

## 2018-11-05 RX ADMIN — PREDNISONE 40 MG: 20 TABLET ORAL at 08:10

## 2018-11-05 RX ADMIN — LIDOCAINE HYDROCHLORIDE 2 ML: 10 INJECTION, SOLUTION EPIDURAL; INFILTRATION; INTRACAUDAL; PERINEURAL at 15:35

## 2018-11-05 RX ADMIN — CEFTRIAXONE 1 G: 1 INJECTION, POWDER, FOR SOLUTION INTRAMUSCULAR; INTRAVENOUS at 07:05

## 2018-11-05 RX ADMIN — HEPARIN SODIUM 4000 UNITS: 1000 INJECTION, SOLUTION INTRAVENOUS; SUBCUTANEOUS at 16:03

## 2018-11-05 RX ADMIN — LORAZEPAM 0.5 MG: 0.5 TABLET ORAL at 21:14

## 2018-11-05 RX ADMIN — ASPIRIN 325 MG: 325 TABLET, DELAYED RELEASE ORAL at 08:10

## 2018-11-05 RX ADMIN — Medication 4000 UNITS: at 09:03

## 2018-11-05 RX ADMIN — MUPIROCIN: 20 OINTMENT TOPICAL at 21:14

## 2018-11-05 ASSESSMENT — ACTIVITIES OF DAILY LIVING (ADL): ADLS_ACUITY_SCORE: 9

## 2018-11-05 ASSESSMENT — PAIN DESCRIPTION - DESCRIPTORS: DESCRIPTORS: ACHING

## 2018-11-05 NOTE — PLAN OF CARE
Problem: Patient Care Overview  Goal: Plan of Care/Patient Progress Review  Outcome: No Change  VSS. Monitor shows Sinus rhythm. Pt. Denies chest pain. Heparin drip continues at 1350 unit(s)/hr. Plan for Angiogram today. Spouse and family members at bedside.

## 2018-11-05 NOTE — PROVIDER NOTIFICATION
MD Notification    Notified Person: MD    Notified Person Name:      Notification Date/Time:  11/5/2018, 0858    Notification Interaction:  Text paged    Purpose of Notification:  Lactic acid 2.3    Orders Received:    Comments:

## 2018-11-05 NOTE — PROGRESS NOTES
Family took all belongings with pt. Phone chargers sent with HUC to ICU. Family walked to ICU room.

## 2018-11-05 NOTE — PROGRESS NOTES
ICU brief note:  Patient brought to ICU after angiogram (no stent) and IABP placement this afternoon with severe 3vessel coronary disease seen (CT surgery consult pending).    He is awake, talking, MAP in the 70-80s range when the IABP is paused.  HR of 90s.     ICU team aware of the patient- available for assistance as needed

## 2018-11-05 NOTE — PROGRESS NOTES
[facility]    Cardiology Progress Note    Date of Service: 11/05/2018     Assessment & Plan   David Richard Schoenecker is a 68 year old male with past medical history of smoking, statin intolerance, FH of CAD.  This patient was admitted on 11/4/2018 with symptoms of shortness of breath and chest pain with positive trops    1. Non Stemi  Trop 17  Echo: EF 30-35% with apical, distal anterior, mid and distal lateral HK; inferolateral HK  ASA/IV Heparin  No betablockers-->wheezing and low bp  No ace for now-->soft bp  Few PVC's  See chest CT below  Will need pulmonary consult to obtain opinion regarding need for bronch as this would influence our decision on SARMAD versus bare metal stent and timing of such  -hold off on angiogram until later today after seen by pulmonary-cath lab notified  -renal function normal/no contrast allergy    2.CT chest shows hilar and subcarinal lymph nodes; nodular airspaces throughout both lungs with groudglass opacity; largest nodule in LLL 1.1cm=-->differential pnuemonia, fungal infection or metastatic disease/COPD   -procalcitonin negative  -IV antibiotics  -ID following  -blood cultures negative so far  -fungal cultures pending  - gram stain-->Mixed gram negative and positive man  -antibiotics/steroids  -Pulmonary consult prior to angiogram--see above    3. Dyslipidemia-mixed  Hx of statin intolerance  -lipitor 80mg now  -states he tolerated Crestor the best-far better than lipitor so will change  --  -HDL 36  -    4. Smoker  -nicoderm patches  -smoking cessation counseling      Jessica Mosquera, MSN, APRN, CNP  [facility]    Interval History   SOB with any activity; chest pain free at rest    Review of Systems:  The Review of Systems is negative other than noted in the HPI    Physical Exam   Temp: 97.7  F (36.5  C) Temp src: Oral BP: 102/70   Heart Rate: 93 Resp: 16 SpO2: 99 % O2 Device: Nasal cannula Oxygen Delivery: 3 LPM  Vitals:    11/04/18  0444 11/04/18 0822 11/05/18 0604   Weight: 97.1 kg (214 lb) 100.1 kg (220 lb 10.9 oz) 99.3 kg (219 lb)     Vital Signs with Ranges  Temp:  [97.7  F (36.5  C)-98.7  F (37.1  C)] 97.7  F (36.5  C)  Heart Rate:  [83-97] 93  Resp:  [13-25] 16  BP: ()/(54-77) 102/70  SpO2:  [92 %-100 %] 99 %  I/O last 3 completed shifts:  In: 648.67 [P.O.:480; I.V.:168.67]  Out: 750 [Urine:750]    Constitutional     alert and oriented, in no acute distress.     Skin     warm and dry to touch    ENT     no pallor or cyanosis    Neck    Supple, JVP normal, carotids difficult to assess due to upper airway wheezing    Chest     no tenderness to palpation     Lungs  Decreased breath sounds with scattered wheezed throughout  Increased AP diameter and prolong expiration    Cardiac  regular rhythm, S1 normal, S2 normal, No S3 or S4, no murmurs, no rubs--tachy    Abdomen     abdomen soft, bowel sounds normoactive, no hepatosplenomegaly    Extremities and Back     no clubbing, cyanosis. No edema observed.  No femoral bruits      Neurological     no gross motor deficits noted, affect appropriate, oriented to time, person and place.        Medications     - MEDICATION INSTRUCTIONS -       HEParin 1,000 Units/hr (11/05/18 0810)     - MEDICATION INSTRUCTIONS -       - MEDICATION INSTRUCTIONS -       Reason ACE/ARB/ARNI order not selected       Reason beta blocker order not selected       sodium chloride         aspirin  325 mg Oral Daily     atorvastatin  80 mg Oral QPM     cefTRIAXone  1 g Intravenous Q24H     nicotine  1 patch Transdermal Daily     nicotine   Transdermal Q8H     nicotine   Transdermal Daily     predniSONE  40 mg Oral Daily     sodium chloride (PF)  3 mL Intracatheter Q8H          Data:     ROUTINE IP LABS (Last four results)  BMP  Recent Labs  Lab 11/05/18  0525 11/04/18  0338   NA  --  140   POTASSIUM 4.2 4.0   CHLORIDE  --  107   CONOR  --  9.0   CO2  --  25   BUN  --  19   CR  --  1.03   GLC  --  132*      CHOLESTEROL/HEPATIC  Recent Labs  Lab 11/05/18  0525 11/04/18 0338   CHOL 261*  --    TRIG 128  --    *  --    HDL 36*  --    ALT  --  19   AST  --  34     CBC  Recent Labs  Lab 11/04/18 0848 11/04/18 0338   WBC 14.5* 13.9*   RBC 3.67* 4.22*   HGB 12.8* 14.6   HCT 37.2* 42.9   * 102*   MCH 34.9* 34.6*   MCHC 34.4 34.0   RDW 12.6 12.4   * 129*     TROP:   Recent Labs  Lab 11/04/18 1128 11/04/18 0848 11/04/18 0338   TROPI 17.825* 13.120* 2.420*      BNP:  No results for input(s): NTBNPI in the last 168 hours.  INRNo lab results found in last 7 days.  No results found for: TSH    EKG results:  Reviewed if available     Imaging:  No results found for this or any previous visit (from the past 24 hour(s)).  Telemetry:

## 2018-11-05 NOTE — PROGRESS NOTES
[facility]    Infectious Disease Progress Note    Date of Service (when I saw the patient): 11/05/2018     Assessment & Plan   David Richard Schoenecker is a 68 year old male who was admitted on 11/4/2018.     Impression:  1. 68 y.o male with ACS.   2. Smoking. ? COPD.   3. Possible pneumonia.   4. For now on CAP treatment.   5. Imaging raising possibility of fungal process vs malignancy.      Recommendations:   Will follow up on  The cultures and serology.   Continue  With CAP treatment for now.     Peter Hickman MD    Interval History   Says breathing is better   WBC up on steroids   No positive data in the cultures     Physical Exam   Temp: 97.7  F (36.5  C) Temp src: Oral BP: 106/72   Heart Rate: 102 Resp: 18 SpO2: 97 % O2 Device: Nasal cannula Oxygen Delivery: 3 LPM  Vitals:    11/04/18 0444 11/04/18 0822 11/05/18 0604   Weight: 97.1 kg (214 lb) 100.1 kg (220 lb 10.9 oz) 99.3 kg (219 lb)     Vital Signs with Ranges  Temp:  [97.7  F (36.5  C)-98.7  F (37.1  C)] 97.7  F (36.5  C)  Heart Rate:  [] 102  Resp:  [15-25] 18  BP: (101-121)/(59-77) 106/72  SpO2:  [96 %-100 %] 97 %    Constitutional: Awake, alert, cooperative, no apparent distress  Lungs: Clear to auscultation bilaterally, no crackles or wheezing  Cardiovascular: Regular rate and rhythm, normal S1 and S2, and no murmur noted  Abdomen: Normal bowel sounds, soft, non-distended, non-tender  Skin: No rashes, no cyanosis, no edema  Other:    Medications     - MEDICATION INSTRUCTIONS -       HEParin Stopped (11/05/18 0903)     - MEDICATION INSTRUCTIONS -       - MEDICATION INSTRUCTIONS -       Reason ACE/ARB/ARNI order not selected       Reason beta blocker order not selected       sodium chloride Stopped (11/05/18 0905)       aspirin  325 mg Oral Daily     cefTRIAXone  1 g Intravenous Q24H     nicotine  1 patch Transdermal Daily     nicotine   Transdermal Q8H     nicotine   Transdermal Daily     predniSONE  40 mg Oral Daily      rosuvastatin  40 mg Oral Daily     sodium chloride (PF)  3 mL Intracatheter Q8H       Data   All microbiology laboratory data reviewed.  Recent Labs   Lab Test  11/04/18   0848  11/04/18 0338   WBC  14.5*  13.9*   HGB  12.8*  14.6   HCT  37.2*  42.9   MCV  101*  102*   PLT  112*  129*     Recent Labs   Lab Test  11/04/18 0338   CR  1.03     No lab results found.  Recent Labs   Lab Test  11/04/18   0720  11/04/18 0338   CULT  No growth after 19 hours  No growth after 19 hours

## 2018-11-05 NOTE — PROGRESS NOTES
"[facility]    Hospitalist Progress Note    Interval History   - No acute symptoms this morning. Regarding his diet he says, \"I see food, I eat it.\" Has not seen a doctor or taken his statin medications for five years, he had myalgias related to his statin. He reports GRANADOS for the past week and it is unchanged this morning.  - Wife Eun at bedside, updated  - Possible cath later today    Assessment & Plan   Summary: David Schoenecker is a 69yo M with PMH of tobacco use disorder, untreated hyperlipidemia, who was admitted 11/4/2018 after presenting with progressive chest pain, and dyspnea, and found to have NSTEMI. He has a markedly elevated troponin of 2.4-->17, new cardiomyopathy with EF 35-40% and WMA. EKG without ST elevation. CTA negative for dissection, but concerning for hilar adenopathy. On heparin drip, pending cath, evaluating lung disease first.    NSTEMI  New ischemic cardiomyopathy  Patient with one week of progressive chest pain with exertion then at rest, on admission skyrocketing troponin 2.4-->17, inferolateral ST depression on EKG, echo with low EF 35-40% and WMA all consistent with significant NSTEMI.  - Cardiology consulted, appreciate recs   - Cath pending further pulmonary investigation   - ASA 325mg daily   - Continue heparin drip   - Tele  - NS 500ml bolus this AM + 75ml/hr while NPO    Suspected CAP vs. Fungal infection  Acute hypoxic respiratory insufficiency  Patient presented with ACS but also described several days of flu symptoms with myalgias, cough. He denies weight loss. Was in [place] for three weeks in 9/2018, patient owns a condo there. CTA was notable for nodular bilateral airspace opacities with mediastinal and hilar adenopathy suggestive of fungal vs. Bacterial infection vs. Malignancy.  - ID consulted appreciate recs   - Continue Ceftriaxone and azithromycin   - Fungal studies  - Pulmonary consulted, appreciate recs    Suspected new COPD exacerbation: Patient " is a lifelong smoker. Noted wheezing this admission in the setting of dyspnea, ACS, possible fungal vs bacterial infection.   - Continue prednisone 40mg daily  - Duonebs PRN  - LFTs as outpatient    Hyperlipidemia: . Patient reports intolerance to statins which he last took 5 years ago.  - Rosuvastatin  - Low fat low Na diet here    Tobacco: Nicotine patch here  - Smoking cessation    DVT Prophylaxis: Pneumatic Compression Devices  Code Status: Full Code  PT/OT: Not yet    Disposition: Expected discharge in 2-3 days    Chacorta Garcia MD  Text Page  (7am to 6pm)  -Data reviewed today: I reviewed all new labs and imaging results over the last 24 hours.    Physical Exam   Temp: 97.7  F (36.5  C) Temp src: Oral BP: 106/72   Heart Rate: 102 Resp: 18 SpO2: 97 % O2 Device: Nasal cannula Oxygen Delivery: 3 LPM  Vitals:    11/04/18 0444 11/04/18 0822 11/05/18 0604   Weight: 97.1 kg (214 lb) 100.1 kg (220 lb 10.9 oz) 99.3 kg (219 lb)     Vital Signs with Ranges  Temp:  [97.7  F (36.5  C)-98.7  F (37.1  C)] 97.7  F (36.5  C)  Heart Rate:  [] 102  Resp:  [15-25] 18  BP: (101-121)/(59-77) 106/72  SpO2:  [96 %-100 %] 97 %  I/O last 3 completed shifts:  In: 648.67 [P.O.:480; I.V.:168.67]  Out: 750 [Urine:750]  O2 requirements: yes    Constitutional: Male in NAD  HEENT: Eyes nonicteric, oral mucosa moist  Cardiovascular: RRR, normal S1/2  Respiratory: Scattered wheezing, slightly decreased airflow, no crackles  Vascular: No LE pitting edema  GI: Normoactive bowel sounds, nontender, nondistended  Skin/Integumen: No rashes  Neuro/Psych: Appropriate affect and mood. A&Ox3, moves all extremities    Medications     - MEDICATION INSTRUCTIONS -       HEParin Stopped (11/05/18 0903)     - MEDICATION INSTRUCTIONS -       - MEDICATION INSTRUCTIONS -       Reason ACE/ARB/ARNI order not selected       Reason beta blocker order not selected       sodium chloride Stopped (11/05/18 0905)       aspirin  325 mg Oral Daily      cefTRIAXone  1 g Intravenous Q24H     nicotine  1 patch Transdermal Daily     nicotine   Transdermal Q8H     nicotine   Transdermal Daily     predniSONE  40 mg Oral Daily     rosuvastatin  40 mg Oral Daily     sodium chloride (PF)  3 mL Intracatheter Q8H       Data     Recent Labs  Lab 11/05/18 0525 11/04/18  1128 11/04/18  0848 11/04/18  0338   WBC  --   --  14.5* 13.9*   HGB  --   --  12.8* 14.6   MCV  --   --  101* 102*   PLT  --   --  112* 129*   NA  --   --   --  140   POTASSIUM 4.2  --   --  4.0   CHLORIDE  --   --   --  107   CO2  --   --   --  25   BUN  --   --   --  19   CR  --   --   --  1.03   ANIONGAP  --   --   --  8   CONOR  --   --   --  9.0   GLC  --   --   --  132*   ALBUMIN  --   --   --  3.9   PROTTOTAL  --   --   --  7.3   BILITOTAL  --   --   --  1.1   ALKPHOS  --   --   --  87   ALT  --   --   --  19   AST  --   --   --  34   TROPI  --  17.825* 13.120* 2.420*       Imaging:   No results found for this or any previous visit (from the past 24 hour(s)).

## 2018-11-05 NOTE — PROGRESS NOTES
PULMONARY CONSULT NOTE    Full consult dictated.    Active Problems:    ACS (acute coronary syndrome) (H)           Assessment and Plan:      69 yo male smoker (no prior hx lung disease) admitted with NSTEMI and new ischemic CM. CXR on admission clear. CT Chest showed nodular bilateral airspace opacities surrounded by groundglass opacity with associated mediastinal and left hilar adenopathy. DDx includes infectious and inflammatory processes (malignancy unlikely) but does not require urgent evaluation with bronchoscopy at this time. Patient has been seen by ID and Sputum C&S and fungal serologies have been ordered and he has been started on empiric antibiotics (which I agree). I would proceed with Cardiology evaluation as planned and follow CT Chest as outpatient. Respiratory status stable on low flow O2.    Diagnoses  Abnl CT/CXR  R91.8  Adenopathy  R59.9  Nicotine depend F17.210  SOB   R06.02    PLAN:  1. Adjust oxygen, keep SaO2 > 90%  2. Sputum C&S, fungal serologies as ordered.  3. Antibiotics - Rocephin  4. Steroids - Prednisone  5. Smoking cessation, Nicotine patch.  6. Recommend follow-up CT Chest in 6 weeks.  7. Outpatient Pulmonary follow-up to review CT Chest and check PFTs to assess for underlying COPD.  8. OK to proceed with cardiac cath this afternoon.    Case discussed with Cardiology.    Updated family at bedside.    Thanks, will follow.      Henok Do MD    [facility] / [facility]  [phone] (pager)  [phone] (office)

## 2018-11-05 NOTE — CONSULTS
Consult Date:  11/05/2018      PULMONARY CONSULTATION       REASON FOR CONSULTATION:  Shortness of breath, abnormal CT scan of the chest.      HISTORY OF PRESENT ILLNESS:  The patient is a 68-year-old male smoker who was admitted to [facility] yesterday 11/04/2018 with chest pain and shortness of breath.  He was diagnosed with a non-ST elevation myocardial infarction and a new ischemic cardiomyopathy.  Echocardiogram showed a moderately reduced left ventricular systolic function with an ejection fraction of 30%-35% and regional wall motion abnormalities.  Chest x-ray on admission was clear; however, a CT scan of the chest showed nodular bilateral airspace opacities surrounded by ground-glass opacities with some associated mediastinal and left hilar adenopathy.  The radiologist was concerned about possible fungal infection, although multifocal bacterial pneumonia or metastatic disease was also in the differential.  We are now consulted regarding further evaluation.  The patient reports a several month history of chest burning and shortness of breath with activity.  He has not had any fevers, chills, night sweats, change in cough or sputum production or hemoptysis.  He smokes up to 1 pack per day and has done so up until his time of admission to the hospital.  He does spend the barclay in [place] and was there for 3 weeks in September but does not recall having a respiratory illness during that time.      MEDICATIONS:  Aspirin, Rocephin, nicotine patch, prednisone, Crestor.      PAST MEDICAL HISTORY:  Notable for hypercholesterolemia.      ALLERGIES:  NO KNOWN ALLERGIES.      SOCIAL HISTORY:  The patient is retired.  He smokes up to 1 pack per day.  He has no prior history of any lung disease.      FAMILY HISTORY:  Noncontributory.      REVIEW OF SYSTEMS:  Noncontributory.      PHYSICAL EXAMINATION:   GENERAL:  Reveals a pleasant older gentleman resting comfortably in bed.   VITAL SIGNS:  He is afebrile.   Respiratory rate is 12, pulse is , blood pressure 97/74 and oxygen saturation is 97% on 3 liters per nasal cannula.   HEENT:  Grossly unremarkable.   NECK:  Supple.   LUNGS:  Revealed decreased breath sounds with prolonged expiratory phase but no wheezes, rales or rhonchi.   CARDIAC:  Revealed regular rate and rhythm.   ABDOMEN:  Obese, soft and nontender.   EXTREMITIES:  Showed no cyanosis, clubbing or edema.      LABORATORY DATA:  Electrolytes, renal function, hemoglobin and hematocrit were all normal.  White count was 13.9 on admission, 14.5 today.  Venous blood gas, pH 7.37, pCO2 of 41, pO2 of 24.  Chest imaging studies are as summarized above.      ASSESSMENT:  A 68-year-old male smoker, admitted with a non-ST elevation myocardial infarction and a new ischemic cardiomyopathy.  Chest imaging shows nodular bilateral airspace opacities with some ground-glass opacities and associated thoracic lymphadenopathy.  Differential diagnosis includes infectious and inflammatory processes with malignancy being much less likely.  Infections could include bacterial versus fungal as patient does live part of the year in [place] and is at risk for [place] fever (coccidioidomycosis).  His radiographic abnormalities do not require urgent evaluation with bronchoscopy at this time.  He has been seen by Infectious Disease and sputum cultures and fungal serologies have been ordered, and he has been started on empiric antibiotics.  I would recommend proceeding with cardiology evaluation as planned, which includes coronary angiography and possible stent placement.  I recommend that his CT scan of the chest be followed as an outpatient and further evaluation to be based on follow-up imaging study.  His respiratory status is currently stable on low-flow oxygen.      PLAN:   1.  Titrate oxygen to keep SpO2 greater than 90%.   2.  Await results of sputum and fungal serology studies.   3.  Antibiotics, Rocephin.   4.  Steroids,  prednisone.   5.  Smoking cessation, nicotine patch.   6.  Recommend follow-up CT scan of the chest in 6 weeks.   7.  Outpatient pulmonary followup to review CT scan of the chest and check pulmonary function testing to assess for underlying COPD.   8.  Okay to proceed with cardiac catheterization this afternoon.      Case was discussed with Cardiology.  The patient's family was updated at the bedside.  I appreciate the opportunity to participate in his care.         CASSIDY MENDIOLA MD             D: 2018   T: 2018   MT: CC      Name:     SCHOENECKER, DAVID   MRN:      [id number]        Account:       [id number]   :      1950           Consult Date:  2018      Document: [id number]       cc: Cassidy Solares MD

## 2018-11-05 NOTE — PROGRESS NOTES
CV Surgery    Patient seen and examined, coronary angiogram reviewed and discussed with Dr. Sellers. Currently no chest pain with IABP. Will proceed with CABG tomorrow morning. Full consult note to follow.    Sander Wan MD

## 2018-11-05 NOTE — PROGRESS NOTES
Severe left main and three vessel disease. IABP inserted. Pt pain free. BP  systolic, augmented, 148 to 155 systolic. Will give low dose ACEI as augmented BP greater than 140. CABG tomorrow unless worsens tonight. Informed on call cardiologist, Dr Palacios.

## 2018-11-05 NOTE — PLAN OF CARE
Problem: Cardiac: ACS (Acute Coronary Syndrome) (Adult)  Goal: Signs and Symptoms of Listed Potential Problems Will be Absent, Minimized or Managed (Cardiac: ACS)  Signs and symptoms of listed potential problems will be absent, minimized or managed by discharge/transition of care (reference Cardiac: ACS (Acute Coronary Syndrome) (Adult) CPG).   Outcome: No Change  VSS overnight, chest pain free.Expiratory wheezes audible and pt is GRANADOS with frequent non-productive cough- on 3L O2 and given prn duoneb x1 with some improvement. Up SBA-  Bedrest with bathroom privileges. Heparin infusing. Plan for angiogram today. Consent signed, education completed (per report), videos watched. Tele SR.

## 2018-11-05 NOTE — PLAN OF CARE
Problem: Patient Care Overview  Goal: Plan of Care/Patient Progress Review  Outcome: No Change  VSS, SR, angio in a.m. A+Ox4. Neuros intact. Ambulates to bathroom. No pain. Heparin gtt at 1000, a.m. Recheck.

## 2018-11-06 ENCOUNTER — ANESTHESIA (OUTPATIENT)
Dept: SURGERY | Facility: CLINIC | Age: 68
DRG: 233 | End: 2018-11-06
Payer: COMMERCIAL

## 2018-11-06 ENCOUNTER — APPOINTMENT (OUTPATIENT)
Dept: GENERAL RADIOLOGY | Facility: CLINIC | Age: 68
DRG: 233 | End: 2018-11-06
Attending: PHYSICIAN ASSISTANT
Payer: COMMERCIAL

## 2018-11-06 PROBLEM — Z95.1 S/P CABG (CORONARY ARTERY BYPASS GRAFT): Status: ACTIVE | Noted: 2018-11-06

## 2018-11-06 LAB
1,3 BETA GLUCAN SER-MCNC: <31 PG/ML
ANION GAP SERPL CALCULATED.3IONS-SCNC: 10 MMOL/L (ref 3–14)
ANION GAP SERPL CALCULATED.3IONS-SCNC: 11 MMOL/L (ref 3–14)
ANION GAP SERPL CALCULATED.3IONS-SCNC: 7 MMOL/L (ref 3–14)
APTT PPP: 39 SEC (ref 22–37)
APTT PPP: 49 SEC (ref 22–37)
B-D GLUCAN INTERPRETATION (1,3): NEGATIVE
BACTERIA SPEC CULT: NORMAL
BASE DEFICIT BLDA-SCNC: 7.5 MMOL/L
BASE DEFICIT BLDA-SCNC: 8.9 MMOL/L
BASE DEFICIT BLDA-SCNC: 9.2 MMOL/L
BASE DEFICIT BLDV-SCNC: 8.7 MMOL/L
BLD PROD TYP BPU: NORMAL
BLD UNIT ID BPU: 0
BLOOD PRODUCT CODE: NORMAL
BPU ID: NORMAL
BUN SERPL-MCNC: 31 MG/DL (ref 7–30)
BUN SERPL-MCNC: 35 MG/DL (ref 7–30)
CA-I BLD-MCNC: 4.7 MG/DL (ref 4.4–5.2)
CA-I BLD-SCNC: 4.8 MG/DL (ref 4.4–5.2)
CA-I BLD-SCNC: 4.9 MG/DL (ref 4.4–5.2)
CALCIUM SERPL-MCNC: 7.7 MG/DL (ref 8.5–10.1)
CALCIUM SERPL-MCNC: 8.1 MG/DL (ref 8.5–10.1)
CHLORIDE SERPL-SCNC: 113 MMOL/L (ref 94–109)
CO2 BLD-SCNC: 21 MMOL/L (ref 21–28)
CO2 BLD-SCNC: 21 MMOL/L (ref 21–28)
CO2 BLD-SCNC: 22 MMOL/L (ref 21–28)
CO2 BLDCOV-SCNC: 23 MMOL/L (ref 21–28)
CO2 SERPL-SCNC: 20 MMOL/L (ref 20–32)
CO2 SERPL-SCNC: 20 MMOL/L (ref 20–32)
CO2 SERPL-SCNC: 22 MMOL/L (ref 20–32)
CPB APPLIED: ABNORMAL
CREAT SERPL-MCNC: 0.94 MG/DL (ref 0.66–1.25)
CREAT SERPL-MCNC: 1.36 MG/DL (ref 0.66–1.25)
ERYTHROCYTE [DISTWIDTH] IN BLOOD BY AUTOMATED COUNT: 12.9 % (ref 10–15)
ERYTHROCYTE [DISTWIDTH] IN BLOOD BY AUTOMATED COUNT: 13 % (ref 10–15)
ERYTHROCYTE [DISTWIDTH] IN BLOOD BY AUTOMATED COUNT: 13 % (ref 10–15)
FIBRINOGEN PPP-MCNC: 371 MG/DL (ref 200–420)
GALACTOMANNAN AG SERPL QL IA: NEGATIVE
GALACTOMANNAN AG SERPL-ACNC: 0.05
GFR SERPL CREATININE-BSD FRML MDRD: 52 ML/MIN/1.7M2
GFR SERPL CREATININE-BSD FRML MDRD: 79 ML/MIN/1.7M2
GLUCOSE BLDC GLUCOMTR-MCNC: 125 MG/DL (ref 70–99)
GLUCOSE BLDC GLUCOMTR-MCNC: 136 MG/DL (ref 70–99)
GLUCOSE BLDC GLUCOMTR-MCNC: 143 MG/DL (ref 70–99)
GLUCOSE BLDC GLUCOMTR-MCNC: 153 MG/DL (ref 70–99)
GLUCOSE BLDC GLUCOMTR-MCNC: 155 MG/DL (ref 70–99)
GLUCOSE BLDC GLUCOMTR-MCNC: 166 MG/DL (ref 70–99)
GLUCOSE BLDC GLUCOMTR-MCNC: 208 MG/DL (ref 70–99)
GLUCOSE BLDC GLUCOMTR-MCNC: 219 MG/DL (ref 70–99)
GLUCOSE BLDC GLUCOMTR-MCNC: 234 MG/DL (ref 70–99)
GLUCOSE BLDC GLUCOMTR-MCNC: 99 MG/DL (ref 70–99)
GLUCOSE SERPL-MCNC: 105 MG/DL (ref 70–99)
GLUCOSE SERPL-MCNC: 177 MG/DL (ref 70–99)
HBA1C MFR BLD: 5.9 % (ref 0–5.6)
HCO3 BLD-SCNC: 18 MMOL/L (ref 21–28)
HCO3 BLD-SCNC: 18 MMOL/L (ref 21–28)
HCO3 BLD-SCNC: 20 MMOL/L (ref 21–28)
HCO3 BLDV-SCNC: 20 MMOL/L (ref 21–28)
HCT VFR BLD AUTO: 30 % (ref 40–53)
HCT VFR BLD AUTO: 33.3 % (ref 40–53)
HCT VFR BLD AUTO: 35.2 % (ref 40–53)
HCT VFR BLD CALC: 26 %PCV (ref 40–53)
HCT VFR BLD CALC: 28 %PCV (ref 40–53)
HCT VFR BLD CALC: 29 %PCV (ref 40–53)
HCT VFR BLD CALC: 30 %PCV (ref 40–53)
HCT VFR BLD CALC: 30 %PCV (ref 40–53)
HGB BLD CALC-MCNC: 10.2 G/DL (ref 13.3–17.7)
HGB BLD CALC-MCNC: 10.2 G/DL (ref 13.3–17.7)
HGB BLD CALC-MCNC: 8.8 G/DL (ref 13.3–17.7)
HGB BLD CALC-MCNC: 9.5 G/DL (ref 13.3–17.7)
HGB BLD CALC-MCNC: 9.9 G/DL (ref 13.3–17.7)
HGB BLD-MCNC: 10.9 G/DL (ref 13.3–17.7)
HGB BLD-MCNC: 11.9 G/DL (ref 13.3–17.7)
HGB BLD-MCNC: 9.9 G/DL (ref 13.3–17.7)
INR PPP: 1.37 (ref 0.86–1.14)
INR PPP: 1.78 (ref 0.86–1.14)
INTERPRETATION ECG - MUSE: NORMAL
LACTATE BLD-SCNC: 0.9 MMOL/L (ref 0.7–2.1)
LACTATE BLD-SCNC: 1.1 MMOL/L (ref 0.7–2.1)
LACTATE BLD-SCNC: 1.5 MMOL/L (ref 0.7–2.1)
LACTATE BLD-SCNC: 2.1 MMOL/L (ref 0.7–2.1)
LACTATE BLD-SCNC: 2.5 MMOL/L (ref 0.7–2)
LACTATE BLD-SCNC: 3.3 MMOL/L (ref 0.7–2)
LACTATE BLD-SCNC: 3.3 MMOL/L (ref 0.7–2.1)
LACTATE BLD-SCNC: 4.4 MMOL/L (ref 0.7–2)
LMWH PPP CHRO-ACNC: 0.14 IU/ML
LMWH PPP CHRO-ACNC: 0.45 IU/ML
MAGNESIUM SERPL-MCNC: 2.8 MG/DL (ref 1.6–2.3)
MCH RBC QN AUTO: 34.4 PG (ref 26.5–33)
MCH RBC QN AUTO: 34.7 PG (ref 26.5–33)
MCH RBC QN AUTO: 34.8 PG (ref 26.5–33)
MCHC RBC AUTO-ENTMCNC: 32.7 G/DL (ref 31.5–36.5)
MCHC RBC AUTO-ENTMCNC: 33 G/DL (ref 31.5–36.5)
MCHC RBC AUTO-ENTMCNC: 33.8 G/DL (ref 31.5–36.5)
MCV RBC AUTO: 103 FL (ref 78–100)
MCV RBC AUTO: 105 FL (ref 78–100)
MCV RBC AUTO: 105 FL (ref 78–100)
MRSA DNA SPEC QL NAA+PROBE: NEGATIVE
OXYHGB MFR BLD: 95 % (ref 92–100)
OXYHGB MFR BLD: 95 % (ref 92–100)
OXYHGB MFR BLD: 98 % (ref 92–100)
OXYHGB MFR BLDV: 76 %
PCO2 BLD: 38 MM HG (ref 35–45)
PCO2 BLD: 42 MM HG (ref 35–45)
PCO2 BLD: 42 MM HG (ref 35–45)
PCO2 BLD: 43 MM HG (ref 35–45)
PCO2 BLD: 44 MM HG (ref 35–45)
PCO2 BLD: 46 MM HG (ref 35–45)
PCO2 BLD: 53 MM HG (ref 35–45)
PCO2 BLD: 54 MM HG (ref 35–45)
PCO2 BLD: 56 MM HG (ref 35–45)
PCO2 BLDV: 56 MM HG (ref 40–50)
PCO2 BLDV: 59 MM HG (ref 40–50)
PH BLD: 7.18 PH (ref 7.35–7.45)
PH BLD: 7.18 PH (ref 7.35–7.45)
PH BLD: 7.2 PH (ref 7.35–7.45)
PH BLD: 7.21 PH (ref 7.35–7.45)
PH BLD: 7.3 PH (ref 7.35–7.45)
PH BLD: 7.3 PH (ref 7.35–7.45)
PH BLD: 7.32 PH (ref 7.35–7.45)
PH BLD: 7.32 PH (ref 7.35–7.45)
PH BLD: 7.33 PH (ref 7.35–7.45)
PH BLDV: 7.16 PH (ref 7.32–7.43)
PH BLDV: 7.2 PH (ref 7.32–7.43)
PHOSPHATE SERPL-MCNC: 5.1 MG/DL (ref 2.5–4.5)
PLATELET # BLD AUTO: 150 10E9/L (ref 150–450)
PLATELET # BLD AUTO: 73 10E9/L (ref 150–450)
PLATELET # BLD AUTO: 98 10E9/L (ref 150–450)
PO2 BLD: 101 MM HG (ref 80–105)
PO2 BLD: 131 MM HG (ref 80–105)
PO2 BLD: 290 MM HG (ref 80–105)
PO2 BLD: 307 MM HG (ref 80–105)
PO2 BLD: 379 MM HG (ref 80–105)
PO2 BLD: 383 MM HG (ref 80–105)
PO2 BLD: 430 MM HG (ref 80–105)
PO2 BLD: 441 MM HG (ref 80–105)
PO2 BLD: 93 MM HG (ref 80–105)
PO2 BLDV: 52 MM HG (ref 25–47)
PO2 BLDV: 59 MM HG (ref 25–47)
POTASSIUM BLD-SCNC: 4.2 MMOL/L (ref 3.4–5.3)
POTASSIUM BLD-SCNC: 4.6 MMOL/L (ref 3.4–5.3)
POTASSIUM BLD-SCNC: 5.4 MMOL/L (ref 3.4–5.3)
POTASSIUM BLD-SCNC: 5.6 MMOL/L (ref 3.4–5.3)
POTASSIUM BLD-SCNC: 5.7 MMOL/L (ref 3.4–5.3)
POTASSIUM SERPL-SCNC: 4.2 MMOL/L (ref 3.4–5.3)
POTASSIUM SERPL-SCNC: 4.6 MMOL/L (ref 3.4–5.3)
POTASSIUM SERPL-SCNC: 4.8 MMOL/L (ref 3.4–5.3)
RBC # BLD AUTO: 2.85 10E12/L (ref 4.4–5.9)
RBC # BLD AUTO: 3.17 10E12/L (ref 4.4–5.9)
RBC # BLD AUTO: 3.42 10E12/L (ref 4.4–5.9)
SAO2 % BLDA FROM PO2: 100 % (ref 92–100)
SAO2 % BLDV FROM PO2: 83 %
SODIUM BLD-SCNC: 139 MMOL/L (ref 133–144)
SODIUM BLD-SCNC: 141 MMOL/L (ref 133–144)
SODIUM BLD-SCNC: 142 MMOL/L (ref 133–144)
SODIUM BLD-SCNC: 142 MMOL/L (ref 133–144)
SODIUM BLD-SCNC: 143 MMOL/L (ref 133–144)
SODIUM SERPL-SCNC: 142 MMOL/L (ref 133–144)
SODIUM SERPL-SCNC: 143 MMOL/L (ref 133–144)
SODIUM SERPL-SCNC: 144 MMOL/L (ref 133–144)
SPECIMEN SOURCE: NORMAL
SPECIMEN SOURCE: NORMAL
TRANSFUSION STATUS PATIENT QL: NORMAL
WBC # BLD AUTO: 11.6 10E9/L (ref 4–11)
WBC # BLD AUTO: 27.8 10E9/L (ref 4–11)
WBC # BLD AUTO: 6.8 10E9/L (ref 4–11)

## 2018-11-06 PROCEDURE — 85027 COMPLETE CBC AUTOMATED: CPT | Performed by: INTERNAL MEDICINE

## 2018-11-06 PROCEDURE — 25000125 ZZHC RX 250: Performed by: SURGERY

## 2018-11-06 PROCEDURE — P9041 ALBUMIN (HUMAN),5%, 50ML: HCPCS

## 2018-11-06 PROCEDURE — P9041 ALBUMIN (HUMAN),5%, 50ML: HCPCS | Performed by: PHYSICIAN ASSISTANT

## 2018-11-06 PROCEDURE — 82803 BLOOD GASES ANY COMBINATION: CPT

## 2018-11-06 PROCEDURE — 4A133B3 MONITORING OF ARTERIAL PRESSURE, PULMONARY, PERCUTANEOUS APPROACH: ICD-10-PCS | Performed by: SURGERY

## 2018-11-06 PROCEDURE — 85384 FIBRINOGEN ACTIVITY: CPT | Performed by: HOSPITALIST

## 2018-11-06 PROCEDURE — 25000131 ZZH RX MED GY IP 250 OP 636 PS 637: Performed by: PHYSICIAN ASSISTANT

## 2018-11-06 PROCEDURE — 94640 AIRWAY INHALATION TREATMENT: CPT

## 2018-11-06 PROCEDURE — 36000073 ZZH SURGERY LEVEL 6 1ST 30 MIN: Performed by: SURGERY

## 2018-11-06 PROCEDURE — 40000922 ZZH STATISTIC RCP TIME PACU VENT EA 10 MIN

## 2018-11-06 PROCEDURE — 93010 ELECTROCARDIOGRAM REPORT: CPT | Performed by: INTERNAL MEDICINE

## 2018-11-06 PROCEDURE — 83605 ASSAY OF LACTIC ACID: CPT | Performed by: SURGERY

## 2018-11-06 PROCEDURE — 80051 ELECTROLYTE PANEL: CPT | Performed by: HOSPITALIST

## 2018-11-06 PROCEDURE — 25000128 H RX IP 250 OP 636: Performed by: ANESTHESIOLOGY

## 2018-11-06 PROCEDURE — 83605 ASSAY OF LACTIC ACID: CPT | Performed by: PHYSICIAN ASSISTANT

## 2018-11-06 PROCEDURE — 80048 BASIC METABOLIC PNL TOTAL CA: CPT | Performed by: PHYSICIAN ASSISTANT

## 2018-11-06 PROCEDURE — 36000075 ZZH SURGERY LEVEL 6 EA 15 ADDTL MIN: Performed by: SURGERY

## 2018-11-06 PROCEDURE — P9037 PLATE PHERES LEUKOREDU IRRAD: HCPCS | Performed by: HOSPITALIST

## 2018-11-06 PROCEDURE — 37000008 ZZH ANESTHESIA TECHNICAL FEE, 1ST 30 MIN: Performed by: SURGERY

## 2018-11-06 PROCEDURE — 25000131 ZZH RX MED GY IP 250 OP 636 PS 637: Performed by: SURGERY

## 2018-11-06 PROCEDURE — 25000128 H RX IP 250 OP 636: Performed by: NURSE ANESTHETIST, CERTIFIED REGISTERED

## 2018-11-06 PROCEDURE — 25000128 H RX IP 250 OP 636: Performed by: SURGERY

## 2018-11-06 PROCEDURE — P9041 ALBUMIN (HUMAN),5%, 50ML: HCPCS | Performed by: NURSE ANESTHETIST, CERTIFIED REGISTERED

## 2018-11-06 PROCEDURE — 25000128 H RX IP 250 OP 636: Performed by: HOSPITALIST

## 2018-11-06 PROCEDURE — 80048 BASIC METABOLIC PNL TOTAL CA: CPT | Performed by: INTERNAL MEDICINE

## 2018-11-06 PROCEDURE — C9113 INJ PANTOPRAZOLE SODIUM, VIA: HCPCS | Performed by: PHYSICIAN ASSISTANT

## 2018-11-06 PROCEDURE — 94003 VENT MGMT INPAT SUBQ DAY: CPT

## 2018-11-06 PROCEDURE — 85730 THROMBOPLASTIN TIME PARTIAL: CPT | Performed by: PHYSICIAN ASSISTANT

## 2018-11-06 PROCEDURE — 40000986 XR CHEST PORT 1 VW

## 2018-11-06 PROCEDURE — 84132 ASSAY OF SERUM POTASSIUM: CPT

## 2018-11-06 PROCEDURE — 021109W BYPASS CORONARY ARTERY, TWO ARTERIES FROM AORTA WITH AUTOLOGOUS VENOUS TISSUE, OPEN APPROACH: ICD-10-PCS | Performed by: SURGERY

## 2018-11-06 PROCEDURE — 02100Z9 BYPASS CORONARY ARTERY, ONE ARTERY FROM LEFT INTERNAL MAMMARY, OPEN APPROACH: ICD-10-PCS | Performed by: SURGERY

## 2018-11-06 PROCEDURE — 25000128 H RX IP 250 OP 636

## 2018-11-06 PROCEDURE — 37000009 ZZH ANESTHESIA TECHNICAL FEE, EACH ADDTL 15 MIN: Performed by: SURGERY

## 2018-11-06 PROCEDURE — 41000023 ZZH PERA-PERFUSION, SH ONLY,  EACH ADDTL 15 MIN: Performed by: SURGERY

## 2018-11-06 PROCEDURE — 85730 THROMBOPLASTIN TIME PARTIAL: CPT | Performed by: HOSPITALIST

## 2018-11-06 PROCEDURE — 82805 BLOOD GASES W/O2 SATURATION: CPT | Performed by: SURGERY

## 2018-11-06 PROCEDURE — 25000125 ZZHC RX 250

## 2018-11-06 PROCEDURE — 25000132 ZZH RX MED GY IP 250 OP 250 PS 637: Performed by: PHYSICIAN ASSISTANT

## 2018-11-06 PROCEDURE — 41000022 ZZH PER-PERFUSION, SH ONLY,  1ST 30 MIN: Performed by: SURGERY

## 2018-11-06 PROCEDURE — 25000125 ZZHC RX 250: Performed by: HOSPITALIST

## 2018-11-06 PROCEDURE — 99291 CRITICAL CARE FIRST HOUR: CPT | Performed by: SURGERY

## 2018-11-06 PROCEDURE — 40000171 ZZH STATISTIC PRE-PROCEDURE ASSESSMENT III: Performed by: SURGERY

## 2018-11-06 PROCEDURE — 4A1239Z MONITORING OF CARDIAC OUTPUT, PERCUTANEOUS APPROACH: ICD-10-PCS | Performed by: SURGERY

## 2018-11-06 PROCEDURE — 83605 ASSAY OF LACTIC ACID: CPT

## 2018-11-06 PROCEDURE — 85610 PROTHROMBIN TIME: CPT | Performed by: PHYSICIAN ASSISTANT

## 2018-11-06 PROCEDURE — 83036 HEMOGLOBIN GLYCOSYLATED A1C: CPT | Performed by: PHYSICIAN ASSISTANT

## 2018-11-06 PROCEDURE — 5A1221Z PERFORMANCE OF CARDIAC OUTPUT, CONTINUOUS: ICD-10-PCS | Performed by: SURGERY

## 2018-11-06 PROCEDURE — 00000146 ZZHCL STATISTIC GLUCOSE BY METER IP

## 2018-11-06 PROCEDURE — 20000003 ZZH R&B ICU

## 2018-11-06 PROCEDURE — 82330 ASSAY OF CALCIUM: CPT

## 2018-11-06 PROCEDURE — 82805 BLOOD GASES W/O2 SATURATION: CPT | Performed by: PHYSICIAN ASSISTANT

## 2018-11-06 PROCEDURE — P9016 RBC LEUKOCYTES REDUCED: HCPCS | Performed by: INTERNAL MEDICINE

## 2018-11-06 PROCEDURE — 84100 ASSAY OF PHOSPHORUS: CPT | Performed by: PHYSICIAN ASSISTANT

## 2018-11-06 PROCEDURE — 84295 ASSAY OF SERUM SODIUM: CPT

## 2018-11-06 PROCEDURE — 85520 HEPARIN ASSAY: CPT | Performed by: INTERNAL MEDICINE

## 2018-11-06 PROCEDURE — 85610 PROTHROMBIN TIME: CPT | Performed by: HOSPITALIST

## 2018-11-06 PROCEDURE — 25000125 ZZHC RX 250: Performed by: NURSE ANESTHETIST, CERTIFIED REGISTERED

## 2018-11-06 PROCEDURE — 40000275 ZZH STATISTIC RCP TIME EA 10 MIN

## 2018-11-06 PROCEDURE — 83735 ASSAY OF MAGNESIUM: CPT | Performed by: PHYSICIAN ASSISTANT

## 2018-11-06 PROCEDURE — 94640 AIRWAY INHALATION TREATMENT: CPT | Mod: 76

## 2018-11-06 PROCEDURE — 25800025 ZZH RX 258: Performed by: PHYSICIAN ASSISTANT

## 2018-11-06 PROCEDURE — 27210794 ZZH OR GENERAL SUPPLY STERILE: Performed by: SURGERY

## 2018-11-06 PROCEDURE — 25000132 ZZH RX MED GY IP 250 OP 250 PS 637: Performed by: SURGERY

## 2018-11-06 PROCEDURE — 85027 COMPLETE CBC AUTOMATED: CPT | Performed by: PHYSICIAN ASSISTANT

## 2018-11-06 PROCEDURE — 25000125 ZZHC RX 250: Performed by: INTERNAL MEDICINE

## 2018-11-06 PROCEDURE — 06BQ4ZZ EXCISION OF LEFT SAPHENOUS VEIN, PERCUTANEOUS ENDOSCOPIC APPROACH: ICD-10-PCS | Performed by: SURGERY

## 2018-11-06 PROCEDURE — 02HQ32Z INSERTION OF MONITORING DEVICE INTO RIGHT PULMONARY ARTERY, PERCUTANEOUS APPROACH: ICD-10-PCS | Performed by: SURGERY

## 2018-11-06 PROCEDURE — 82330 ASSAY OF CALCIUM: CPT | Performed by: PHYSICIAN ASSISTANT

## 2018-11-06 PROCEDURE — 85027 COMPLETE CBC AUTOMATED: CPT | Performed by: HOSPITALIST

## 2018-11-06 PROCEDURE — 25000128 H RX IP 250 OP 636: Performed by: INTERNAL MEDICINE

## 2018-11-06 PROCEDURE — 25000128 H RX IP 250 OP 636: Performed by: PHYSICIAN ASSISTANT

## 2018-11-06 PROCEDURE — 93005 ELECTROCARDIOGRAM TRACING: CPT

## 2018-11-06 PROCEDURE — 85014 HEMATOCRIT: CPT

## 2018-11-06 RX ORDER — FENTANYL CITRATE 50 UG/ML
INJECTION, SOLUTION INTRAMUSCULAR; INTRAVENOUS PRN
Status: DISCONTINUED | OUTPATIENT
Start: 2018-11-06 | End: 2018-11-06

## 2018-11-06 RX ORDER — IPRATROPIUM BROMIDE AND ALBUTEROL SULFATE 2.5; .5 MG/3ML; MG/3ML
3 SOLUTION RESPIRATORY (INHALATION)
Status: DISCONTINUED | OUTPATIENT
Start: 2018-11-06 | End: 2018-11-12 | Stop reason: HOSPADM

## 2018-11-06 RX ORDER — PROPOFOL 10 MG/ML
INJECTION, EMULSION INTRAVENOUS CONTINUOUS PRN
Status: DISCONTINUED | OUTPATIENT
Start: 2018-11-06 | End: 2018-11-06

## 2018-11-06 RX ORDER — MEPERIDINE HYDROCHLORIDE 25 MG/ML
12.5-25 INJECTION INTRAMUSCULAR; INTRAVENOUS; SUBCUTANEOUS
Status: DISCONTINUED | OUTPATIENT
Start: 2018-11-06 | End: 2018-11-08

## 2018-11-06 RX ORDER — ALBUMIN, HUMAN INJ 5% 5 %
SOLUTION INTRAVENOUS
Status: COMPLETED
Start: 2018-11-06 | End: 2018-11-06

## 2018-11-06 RX ORDER — ALBUTEROL SULFATE 90 UG/1
6 AEROSOL, METERED RESPIRATORY (INHALATION) EVERY 4 HOURS
Status: DISCONTINUED | OUTPATIENT
Start: 2018-11-06 | End: 2018-11-08

## 2018-11-06 RX ORDER — PROPOFOL 10 MG/ML
10-20 INJECTION, EMULSION INTRAVENOUS EVERY 30 MIN PRN
Status: DISCONTINUED | OUTPATIENT
Start: 2018-11-06 | End: 2018-11-08 | Stop reason: CLARIF

## 2018-11-06 RX ORDER — EPHEDRINE SULFATE 50 MG/ML
INJECTION, SOLUTION INTRAMUSCULAR; INTRAVENOUS; SUBCUTANEOUS PRN
Status: DISCONTINUED | OUTPATIENT
Start: 2018-11-06 | End: 2018-11-06

## 2018-11-06 RX ORDER — ACETAMINOPHEN 325 MG/1
975 TABLET ORAL EVERY 8 HOURS
Status: DISPENSED | OUTPATIENT
Start: 2018-11-06 | End: 2018-11-09

## 2018-11-06 RX ORDER — SODIUM CHLORIDE 9 MG/ML
INJECTION, SOLUTION INTRAVENOUS CONTINUOUS PRN
Status: DISCONTINUED | OUTPATIENT
Start: 2018-11-06 | End: 2018-11-06

## 2018-11-06 RX ORDER — MAGNESIUM SULFATE HEPTAHYDRATE 40 MG/ML
4 INJECTION, SOLUTION INTRAVENOUS EVERY 4 HOURS PRN
Status: DISCONTINUED | OUTPATIENT
Start: 2018-11-06 | End: 2018-11-12 | Stop reason: HOSPADM

## 2018-11-06 RX ORDER — PROPOFOL 10 MG/ML
INJECTION, EMULSION INTRAVENOUS PRN
Status: DISCONTINUED | OUTPATIENT
Start: 2018-11-06 | End: 2018-11-06

## 2018-11-06 RX ORDER — DEXTROSE MONOHYDRATE, SODIUM CHLORIDE, AND POTASSIUM CHLORIDE 50; 1.49; 4.5 G/1000ML; G/1000ML; G/1000ML
INJECTION, SOLUTION INTRAVENOUS CONTINUOUS
Status: DISCONTINUED | OUTPATIENT
Start: 2018-11-06 | End: 2018-11-09

## 2018-11-06 RX ORDER — DIPHENHYDRAMINE HYDROCHLORIDE 50 MG/ML
12.5 INJECTION INTRAMUSCULAR; INTRAVENOUS EVERY 6 HOURS PRN
Status: DISCONTINUED | OUTPATIENT
Start: 2018-11-06 | End: 2018-11-12 | Stop reason: HOSPADM

## 2018-11-06 RX ORDER — FUROSEMIDE 10 MG/ML
20 INJECTION INTRAMUSCULAR; INTRAVENOUS
Status: DISCONTINUED | OUTPATIENT
Start: 2018-11-06 | End: 2018-11-08

## 2018-11-06 RX ORDER — GABAPENTIN 100 MG/1
100 CAPSULE ORAL 3 TIMES DAILY
Status: DISPENSED | OUTPATIENT
Start: 2018-11-06 | End: 2018-11-09

## 2018-11-06 RX ORDER — SODIUM CHLORIDE, SODIUM LACTATE, POTASSIUM CHLORIDE, CALCIUM CHLORIDE 600; 310; 30; 20 MG/100ML; MG/100ML; MG/100ML; MG/100ML
INJECTION, SOLUTION INTRAVENOUS CONTINUOUS PRN
Status: DISCONTINUED | OUTPATIENT
Start: 2018-11-06 | End: 2018-11-06

## 2018-11-06 RX ORDER — SODIUM CHLORIDE 9 MG/ML
INJECTION, SOLUTION INTRAVENOUS CONTINUOUS
Status: CANCELLED | OUTPATIENT
Start: 2018-11-06

## 2018-11-06 RX ORDER — DEXTROSE MONOHYDRATE 25 G/50ML
25-50 INJECTION, SOLUTION INTRAVENOUS
Status: DISCONTINUED | OUTPATIENT
Start: 2018-11-06 | End: 2018-11-12 | Stop reason: HOSPADM

## 2018-11-06 RX ORDER — NALOXONE HYDROCHLORIDE 0.4 MG/ML
.2-.4 INJECTION, SOLUTION INTRAMUSCULAR; INTRAVENOUS; SUBCUTANEOUS
Status: CANCELLED | OUTPATIENT
Start: 2018-11-06 | End: 2018-11-07

## 2018-11-06 RX ORDER — MUPIROCIN 20 MG/G
0.5 OINTMENT TOPICAL 2 TIMES DAILY
Status: DISCONTINUED | OUTPATIENT
Start: 2018-11-06 | End: 2018-11-06 | Stop reason: CLARIF

## 2018-11-06 RX ORDER — AMOXICILLIN 250 MG
2 CAPSULE ORAL 2 TIMES DAILY
Status: DISCONTINUED | OUTPATIENT
Start: 2018-11-06 | End: 2018-11-12 | Stop reason: HOSPADM

## 2018-11-06 RX ORDER — POTASSIUM CHLORIDE 1500 MG/1
20-40 TABLET, EXTENDED RELEASE ORAL
Status: DISCONTINUED | OUTPATIENT
Start: 2018-11-06 | End: 2018-11-12 | Stop reason: HOSPADM

## 2018-11-06 RX ORDER — SUFENTANIL CITRATE 50 UG/ML
INJECTION EPIDURAL; INTRAVENOUS PRN
Status: DISCONTINUED | OUTPATIENT
Start: 2018-11-06 | End: 2018-11-06

## 2018-11-06 RX ORDER — LIDOCAINE 40 MG/G
CREAM TOPICAL
Status: DISCONTINUED | OUTPATIENT
Start: 2018-11-06 | End: 2018-11-08

## 2018-11-06 RX ORDER — CHLORHEXIDINE GLUCONATE ORAL RINSE 1.2 MG/ML
15 SOLUTION DENTAL EVERY 12 HOURS
Status: DISCONTINUED | OUTPATIENT
Start: 2018-11-06 | End: 2018-11-08 | Stop reason: CLARIF

## 2018-11-06 RX ORDER — MAGNESIUM SULFATE HEPTAHYDRATE 40 MG/ML
2 INJECTION, SOLUTION INTRAVENOUS DAILY PRN
Status: DISCONTINUED | OUTPATIENT
Start: 2018-11-06 | End: 2018-11-12 | Stop reason: HOSPADM

## 2018-11-06 RX ORDER — POTASSIUM CHLORIDE 29.8 MG/ML
20 INJECTION INTRAVENOUS
Status: DISCONTINUED | OUTPATIENT
Start: 2018-11-06 | End: 2018-11-12 | Stop reason: HOSPADM

## 2018-11-06 RX ORDER — LIDOCAINE 4 G/G
2 PATCH TOPICAL
Status: DISCONTINUED | OUTPATIENT
Start: 2018-11-07 | End: 2018-11-12 | Stop reason: HOSPADM

## 2018-11-06 RX ORDER — ONDANSETRON 2 MG/ML
4 INJECTION INTRAMUSCULAR; INTRAVENOUS EVERY 6 HOURS PRN
Status: DISCONTINUED | OUTPATIENT
Start: 2018-11-06 | End: 2018-11-12 | Stop reason: HOSPADM

## 2018-11-06 RX ORDER — OXYCODONE HYDROCHLORIDE 5 MG/1
5-10 TABLET ORAL EVERY 4 HOURS PRN
Status: DISCONTINUED | OUTPATIENT
Start: 2018-11-06 | End: 2018-11-12 | Stop reason: HOSPADM

## 2018-11-06 RX ORDER — FLUMAZENIL 0.1 MG/ML
0.2 INJECTION, SOLUTION INTRAVENOUS
Status: CANCELLED | OUTPATIENT
Start: 2018-11-06 | End: 2018-11-07

## 2018-11-06 RX ORDER — NALOXONE HYDROCHLORIDE 0.4 MG/ML
.1-.4 INJECTION, SOLUTION INTRAMUSCULAR; INTRAVENOUS; SUBCUTANEOUS
Status: DISCONTINUED | OUTPATIENT
Start: 2018-11-06 | End: 2018-11-06

## 2018-11-06 RX ORDER — FENTANYL CITRATE 50 UG/ML
25-50 INJECTION, SOLUTION INTRAMUSCULAR; INTRAVENOUS
Status: CANCELLED | OUTPATIENT
Start: 2018-11-06 | End: 2018-11-07

## 2018-11-06 RX ORDER — HYDRALAZINE HYDROCHLORIDE 20 MG/ML
10 INJECTION INTRAMUSCULAR; INTRAVENOUS EVERY 30 MIN PRN
Status: DISCONTINUED | OUTPATIENT
Start: 2018-11-06 | End: 2018-11-12 | Stop reason: HOSPADM

## 2018-11-06 RX ORDER — POTASSIUM CHLORIDE 1.5 G/1.58G
20-40 POWDER, FOR SOLUTION ORAL
Status: DISCONTINUED | OUTPATIENT
Start: 2018-11-06 | End: 2018-11-12 | Stop reason: HOSPADM

## 2018-11-06 RX ORDER — PROTAMINE SULFATE 10 MG/ML
INJECTION, SOLUTION INTRAVENOUS PRN
Status: DISCONTINUED | OUTPATIENT
Start: 2018-11-06 | End: 2018-11-06

## 2018-11-06 RX ORDER — POTASSIUM CHLORIDE 7.45 MG/ML
10 INJECTION INTRAVENOUS
Status: DISCONTINUED | OUTPATIENT
Start: 2018-11-06 | End: 2018-11-12 | Stop reason: HOSPADM

## 2018-11-06 RX ORDER — LIDOCAINE HYDROCHLORIDE 20 MG/ML
INJECTION, SOLUTION INFILTRATION; PERINEURAL PRN
Status: DISCONTINUED | OUTPATIENT
Start: 2018-11-06 | End: 2018-11-06

## 2018-11-06 RX ORDER — NALOXONE HYDROCHLORIDE 0.4 MG/ML
.1-.4 INJECTION, SOLUTION INTRAMUSCULAR; INTRAVENOUS; SUBCUTANEOUS
Status: DISCONTINUED | OUTPATIENT
Start: 2018-11-06 | End: 2018-11-08

## 2018-11-06 RX ORDER — ACETAMINOPHEN 325 MG/1
650 TABLET ORAL EVERY 4 HOURS PRN
Status: DISCONTINUED | OUTPATIENT
Start: 2018-11-09 | End: 2018-11-09

## 2018-11-06 RX ORDER — DIPHENHYDRAMINE HCL 12.5MG/5ML
12.5 LIQUID (ML) ORAL EVERY 6 HOURS PRN
Status: DISCONTINUED | OUTPATIENT
Start: 2018-11-06 | End: 2018-11-12 | Stop reason: HOSPADM

## 2018-11-06 RX ORDER — PAPAVERINE HYDROCHLORIDE 30 MG/ML
INJECTION INTRAMUSCULAR; INTRAVENOUS PRN
Status: DISCONTINUED | OUTPATIENT
Start: 2018-11-06 | End: 2018-11-06 | Stop reason: HOSPADM

## 2018-11-06 RX ORDER — ATROPINE SULFATE 0.1 MG/ML
0.5 INJECTION INTRAVENOUS EVERY 5 MIN PRN
Status: CANCELLED | OUTPATIENT
Start: 2018-11-06 | End: 2018-11-07

## 2018-11-06 RX ORDER — PROPOFOL 10 MG/ML
5-75 INJECTION, EMULSION INTRAVENOUS CONTINUOUS
Status: DISCONTINUED | OUTPATIENT
Start: 2018-11-06 | End: 2018-11-08 | Stop reason: CLARIF

## 2018-11-06 RX ORDER — NITROGLYCERIN 20 MG/100ML
.07-2 INJECTION INTRAVENOUS CONTINUOUS
Status: DISCONTINUED | OUTPATIENT
Start: 2018-11-06 | End: 2018-11-08

## 2018-11-06 RX ORDER — AMOXICILLIN 250 MG
1 CAPSULE ORAL 2 TIMES DAILY
Status: DISCONTINUED | OUTPATIENT
Start: 2018-11-06 | End: 2018-11-12 | Stop reason: HOSPADM

## 2018-11-06 RX ORDER — VECURONIUM BROMIDE 1 MG/ML
INJECTION, POWDER, LYOPHILIZED, FOR SOLUTION INTRAVENOUS PRN
Status: DISCONTINUED | OUTPATIENT
Start: 2018-11-06 | End: 2018-11-06

## 2018-11-06 RX ORDER — FENTANYL CITRATE 50 UG/ML
50 INJECTION, SOLUTION INTRAMUSCULAR; INTRAVENOUS
Status: DISCONTINUED | OUTPATIENT
Start: 2018-11-06 | End: 2018-11-06 | Stop reason: HOSPADM

## 2018-11-06 RX ORDER — ONDANSETRON 4 MG/1
4 TABLET, ORALLY DISINTEGRATING ORAL EVERY 6 HOURS PRN
Status: DISCONTINUED | OUTPATIENT
Start: 2018-11-06 | End: 2018-11-12 | Stop reason: HOSPADM

## 2018-11-06 RX ORDER — HYDROCODONE BITARTRATE AND ACETAMINOPHEN 5; 325 MG/1; MG/1
1-2 TABLET ORAL EVERY 4 HOURS PRN
Status: CANCELLED | OUTPATIENT
Start: 2018-11-06

## 2018-11-06 RX ORDER — SODIUM CHLORIDE, SODIUM LACTATE, POTASSIUM CHLORIDE, CALCIUM CHLORIDE 600; 310; 30; 20 MG/100ML; MG/100ML; MG/100ML; MG/100ML
INJECTION, SOLUTION INTRAVENOUS CONTINUOUS
Status: DISCONTINUED | OUTPATIENT
Start: 2018-11-06 | End: 2018-11-06 | Stop reason: HOSPADM

## 2018-11-06 RX ORDER — METOPROLOL TARTRATE 1 MG/ML
5 INJECTION, SOLUTION INTRAVENOUS EVERY 6 HOURS
Status: DISCONTINUED | OUTPATIENT
Start: 2018-11-07 | End: 2018-11-08

## 2018-11-06 RX ORDER — NICOTINE POLACRILEX 4 MG
15-30 LOZENGE BUCCAL
Status: DISCONTINUED | OUTPATIENT
Start: 2018-11-06 | End: 2018-11-12 | Stop reason: HOSPADM

## 2018-11-06 RX ORDER — HEPARIN SODIUM 1000 [USP'U]/ML
INJECTION, SOLUTION INTRAVENOUS; SUBCUTANEOUS PRN
Status: DISCONTINUED | OUTPATIENT
Start: 2018-11-06 | End: 2018-11-06

## 2018-11-06 RX ORDER — HYDROMORPHONE HYDROCHLORIDE 1 MG/ML
.3-.5 INJECTION, SOLUTION INTRAMUSCULAR; INTRAVENOUS; SUBCUTANEOUS
Status: DISCONTINUED | OUTPATIENT
Start: 2018-11-06 | End: 2018-11-12 | Stop reason: HOSPADM

## 2018-11-06 RX ORDER — ALBUMIN, HUMAN INJ 5% 5 %
500-1000 SOLUTION INTRAVENOUS
Status: COMPLETED | OUTPATIENT
Start: 2018-11-06 | End: 2018-11-06

## 2018-11-06 RX ORDER — FENTANYL CITRATE 50 UG/ML
25-50 INJECTION, SOLUTION INTRAMUSCULAR; INTRAVENOUS
Status: DISCONTINUED | OUTPATIENT
Start: 2018-11-06 | End: 2018-11-08

## 2018-11-06 RX ORDER — ALBUMIN, HUMAN INJ 5% 5 %
SOLUTION INTRAVENOUS CONTINUOUS PRN
Status: DISCONTINUED | OUTPATIENT
Start: 2018-11-06 | End: 2018-11-06

## 2018-11-06 RX ORDER — POTASSIUM CL/LIDO/0.9 % NACL 10MEQ/0.1L
10 INTRAVENOUS SOLUTION, PIGGYBACK (ML) INTRAVENOUS
Status: DISCONTINUED | OUTPATIENT
Start: 2018-11-06 | End: 2018-11-12 | Stop reason: HOSPADM

## 2018-11-06 RX ORDER — METHOCARBAMOL 500 MG/1
500 TABLET, FILM COATED ORAL 4 TIMES DAILY PRN
Status: DISCONTINUED | OUTPATIENT
Start: 2018-11-06 | End: 2018-11-12 | Stop reason: HOSPADM

## 2018-11-06 RX ORDER — CEFAZOLIN SODIUM 2 G/100ML
2 INJECTION, SOLUTION INTRAVENOUS EVERY 8 HOURS
Status: DISCONTINUED | OUTPATIENT
Start: 2018-11-06 | End: 2018-11-06

## 2018-11-06 RX ADMIN — PHENYLEPHRINE HYDROCHLORIDE 100 MCG: 10 INJECTION, SOLUTION INTRAMUSCULAR; INTRAVENOUS; SUBCUTANEOUS at 09:16

## 2018-11-06 RX ADMIN — SODIUM CHLORIDE: 9 INJECTION, SOLUTION INTRAVENOUS at 07:37

## 2018-11-06 RX ADMIN — VECURONIUM BROMIDE 5 MG: 1 INJECTION, POWDER, LYOPHILIZED, FOR SOLUTION INTRAVENOUS at 10:13

## 2018-11-06 RX ADMIN — PROTAMINE SULFATE 350 MG: 10 INJECTION, SOLUTION INTRAVENOUS at 11:23

## 2018-11-06 RX ADMIN — PHENYLEPHRINE HYDROCHLORIDE 0.8 MCG/KG/MIN: 10 INJECTION INTRAVENOUS at 22:09

## 2018-11-06 RX ADMIN — PREDNISONE 40 MG: 20 TABLET ORAL at 04:08

## 2018-11-06 RX ADMIN — MIDAZOLAM HYDROCHLORIDE 1 MG: 1 INJECTION, SOLUTION INTRAMUSCULAR; INTRAVENOUS at 09:48

## 2018-11-06 RX ADMIN — SODIUM CHLORIDE 1 UNITS/HR: 9 INJECTION, SOLUTION INTRAVENOUS at 13:08

## 2018-11-06 RX ADMIN — SODIUM CHLORIDE, POTASSIUM CHLORIDE, SODIUM LACTATE AND CALCIUM CHLORIDE: 600; 310; 30; 20 INJECTION, SOLUTION INTRAVENOUS at 08:08

## 2018-11-06 RX ADMIN — SUFENTANIL CITRATE 20 MCG: 50 INJECTION EPIDURAL; INTRAVENOUS at 09:03

## 2018-11-06 RX ADMIN — PROPOFOL 40 MCG/KG/MIN: 10 INJECTION, EMULSION INTRAVENOUS at 23:19

## 2018-11-06 RX ADMIN — Medication 5 MG: at 09:16

## 2018-11-06 RX ADMIN — SODIUM CHLORIDE, POTASSIUM CHLORIDE, SODIUM LACTATE AND CALCIUM CHLORIDE: 600; 310; 30; 20 INJECTION, SOLUTION INTRAVENOUS at 07:22

## 2018-11-06 RX ADMIN — MIDAZOLAM HYDROCHLORIDE 5 MG: 1 INJECTION, SOLUTION INTRAMUSCULAR; INTRAVENOUS at 07:38

## 2018-11-06 RX ADMIN — SODIUM CHLORIDE, POTASSIUM CHLORIDE, SODIUM LACTATE AND CALCIUM CHLORIDE: 600; 310; 30; 20 INJECTION, SOLUTION INTRAVENOUS at 08:26

## 2018-11-06 RX ADMIN — PHENYLEPHRINE HYDROCHLORIDE 100 MCG: 10 INJECTION, SOLUTION INTRAMUSCULAR; INTRAVENOUS; SUBCUTANEOUS at 08:21

## 2018-11-06 RX ADMIN — CEFTRIAXONE 1 G: 1 INJECTION, POWDER, FOR SOLUTION INTRAMUSCULAR; INTRAVENOUS at 05:31

## 2018-11-06 RX ADMIN — PROPOFOL 45 MCG/KG/MIN: 10 INJECTION, EMULSION INTRAVENOUS at 14:58

## 2018-11-06 RX ADMIN — FENTANYL CITRATE 400 MCG: 50 INJECTION, SOLUTION INTRAMUSCULAR; INTRAVENOUS at 07:39

## 2018-11-06 RX ADMIN — SUFENTANIL CITRATE 20 MCG: 50 INJECTION EPIDURAL; INTRAVENOUS at 12:11

## 2018-11-06 RX ADMIN — CHLORHEXIDINE GLUCONATE 15 ML: 1.2 RINSE ORAL at 22:00

## 2018-11-06 RX ADMIN — CEFAZOLIN SODIUM 1 G: 2 INJECTION, SOLUTION INTRAVENOUS at 10:17

## 2018-11-06 RX ADMIN — SODIUM CHLORIDE 4 UNITS/HR: 9 INJECTION, SOLUTION INTRAVENOUS at 20:03

## 2018-11-06 RX ADMIN — CEFAZOLIN SODIUM 1 G: 2 INJECTION, SOLUTION INTRAVENOUS at 12:20

## 2018-11-06 RX ADMIN — PHENYLEPHRINE HYDROCHLORIDE 100 MCG: 10 INJECTION, SOLUTION INTRAMUSCULAR; INTRAVENOUS; SUBCUTANEOUS at 09:10

## 2018-11-06 RX ADMIN — CEFAZOLIN SODIUM 2 G: 2 INJECTION, SOLUTION INTRAVENOUS at 08:20

## 2018-11-06 RX ADMIN — PHENYLEPHRINE HYDROCHLORIDE 100 MCG: 10 INJECTION, SOLUTION INTRAMUSCULAR; INTRAVENOUS; SUBCUTANEOUS at 07:46

## 2018-11-06 RX ADMIN — EPINEPHRINE 0.06 MCG/KG/MIN: 1 INJECTION INTRAMUSCULAR; INTRAVENOUS; SUBCUTANEOUS at 10:56

## 2018-11-06 RX ADMIN — FENTANYL CITRATE 100 MCG: 50 INJECTION, SOLUTION INTRAMUSCULAR; INTRAVENOUS at 08:12

## 2018-11-06 RX ADMIN — PROPOFOL 40 MCG/KG/MIN: 10 INJECTION, EMULSION INTRAVENOUS at 11:39

## 2018-11-06 RX ADMIN — PHENYLEPHRINE HYDROCHLORIDE 100 MCG: 10 INJECTION, SOLUTION INTRAMUSCULAR; INTRAVENOUS; SUBCUTANEOUS at 08:00

## 2018-11-06 RX ADMIN — SODIUM CHLORIDE: 9 INJECTION, SOLUTION INTRAVENOUS at 11:25

## 2018-11-06 RX ADMIN — PHENYLEPHRINE HYDROCHLORIDE 100 MCG: 10 INJECTION, SOLUTION INTRAMUSCULAR; INTRAVENOUS; SUBCUTANEOUS at 07:39

## 2018-11-06 RX ADMIN — ALBUMIN (HUMAN): 12.5 SOLUTION INTRAVENOUS at 11:22

## 2018-11-06 RX ADMIN — PROPOFOL 30 MCG/KG/MIN: 10 INJECTION, EMULSION INTRAVENOUS at 19:38

## 2018-11-06 RX ADMIN — PROPOFOL 50 MG: 10 INJECTION, EMULSION INTRAVENOUS at 09:30

## 2018-11-06 RX ADMIN — PANTOPRAZOLE SODIUM 40 MG: 40 INJECTION, POWDER, FOR SOLUTION INTRAVENOUS at 15:24

## 2018-11-06 RX ADMIN — PHENYLEPHRINE HYDROCHLORIDE 200 MCG: 10 INJECTION, SOLUTION INTRAMUSCULAR; INTRAVENOUS; SUBCUTANEOUS at 09:30

## 2018-11-06 RX ADMIN — PHENYLEPHRINE HYDROCHLORIDE 100 MCG: 10 INJECTION, SOLUTION INTRAMUSCULAR; INTRAVENOUS; SUBCUTANEOUS at 07:50

## 2018-11-06 RX ADMIN — ROCURONIUM BROMIDE 50 MG: 10 INJECTION INTRAVENOUS at 07:39

## 2018-11-06 RX ADMIN — MIDAZOLAM HYDROCHLORIDE 1 MG: 1 INJECTION, SOLUTION INTRAMUSCULAR; INTRAVENOUS at 10:58

## 2018-11-06 RX ADMIN — PHENYLEPHRINE HYDROCHLORIDE 100 MCG: 10 INJECTION, SOLUTION INTRAMUSCULAR; INTRAVENOUS; SUBCUTANEOUS at 11:21

## 2018-11-06 RX ADMIN — PHENYLEPHRINE HYDROCHLORIDE 150 MCG: 10 INJECTION, SOLUTION INTRAMUSCULAR; INTRAVENOUS; SUBCUTANEOUS at 11:19

## 2018-11-06 RX ADMIN — EPINEPHRINE 0.04 MCG/KG/MIN: 1 INJECTION INTRAMUSCULAR; INTRAVENOUS; SUBCUTANEOUS at 22:10

## 2018-11-06 RX ADMIN — VECURONIUM BROMIDE 5 MG: 1 INJECTION, POWDER, LYOPHILIZED, FOR SOLUTION INTRAVENOUS at 09:36

## 2018-11-06 RX ADMIN — PHENYLEPHRINE HYDROCHLORIDE 150 MCG: 10 INJECTION, SOLUTION INTRAMUSCULAR; INTRAVENOUS; SUBCUTANEOUS at 11:20

## 2018-11-06 RX ADMIN — PHENYLEPHRINE HYDROCHLORIDE 100 MCG: 10 INJECTION, SOLUTION INTRAMUSCULAR; INTRAVENOUS; SUBCUTANEOUS at 08:10

## 2018-11-06 RX ADMIN — MIDAZOLAM 2 MG: 1 INJECTION INTRAMUSCULAR; INTRAVENOUS at 06:54

## 2018-11-06 RX ADMIN — Medication 5 MG: at 09:09

## 2018-11-06 RX ADMIN — PHENYLEPHRINE HYDROCHLORIDE 100 MCG: 10 INJECTION, SOLUTION INTRAMUSCULAR; INTRAVENOUS; SUBCUTANEOUS at 09:09

## 2018-11-06 RX ADMIN — VECURONIUM BROMIDE 3 MG: 1 INJECTION, POWDER, LYOPHILIZED, FOR SOLUTION INTRAVENOUS at 11:41

## 2018-11-06 RX ADMIN — PHENYLEPHRINE HYDROCHLORIDE 100 MCG: 10 INJECTION, SOLUTION INTRAMUSCULAR; INTRAVENOUS; SUBCUTANEOUS at 08:14

## 2018-11-06 RX ADMIN — SUFENTANIL CITRATE 40 MCG: 50 INJECTION EPIDURAL; INTRAVENOUS at 11:38

## 2018-11-06 RX ADMIN — Medication 5 MG: at 09:30

## 2018-11-06 RX ADMIN — PROPOFOL 20 MG: 10 INJECTION, EMULSION INTRAVENOUS at 07:39

## 2018-11-06 RX ADMIN — SODIUM CHLORIDE: 9 INJECTION, SOLUTION INTRAVENOUS at 00:48

## 2018-11-06 RX ADMIN — FENTANYL CITRATE 50 MCG: 50 INJECTION, SOLUTION INTRAMUSCULAR; INTRAVENOUS at 17:29

## 2018-11-06 RX ADMIN — AZITHROMYCIN MONOHYDRATE 250 MG: 500 INJECTION, POWDER, LYOPHILIZED, FOR SOLUTION INTRAVENOUS at 15:21

## 2018-11-06 RX ADMIN — METOPROLOL TARTRATE 25 MG: 25 TABLET ORAL at 04:08

## 2018-11-06 RX ADMIN — FENTANYL CITRATE 50 MCG: 50 INJECTION, SOLUTION INTRAMUSCULAR; INTRAVENOUS at 23:14

## 2018-11-06 RX ADMIN — ALBUMIN (HUMAN) 500 ML: 12.5 SOLUTION INTRAVENOUS at 16:10

## 2018-11-06 RX ADMIN — Medication 5 MG: at 08:00

## 2018-11-06 RX ADMIN — ALBUMIN (HUMAN) 25 G: 12.5 SOLUTION INTRAVENOUS at 18:20

## 2018-11-06 RX ADMIN — HEPARIN SODIUM 40000 UNITS: 1000 INJECTION, SOLUTION INTRAVENOUS; SUBCUTANEOUS at 09:09

## 2018-11-06 RX ADMIN — RANITIDINE 150 MG: 150 TABLET ORAL at 04:08

## 2018-11-06 RX ADMIN — IPRATROPIUM BROMIDE AND ALBUTEROL SULFATE 3 ML: 2.5; .5 SOLUTION RESPIRATORY (INHALATION) at 00:12

## 2018-11-06 RX ADMIN — IPRATROPIUM BROMIDE AND ALBUTEROL SULFATE 3 ML: 2.5; .5 SOLUTION RESPIRATORY (INHALATION) at 19:49

## 2018-11-06 RX ADMIN — Medication 5 G/HR: at 08:08

## 2018-11-06 RX ADMIN — HYDROMORPHONE HYDROCHLORIDE 0.3 MG: 1 INJECTION, SOLUTION INTRAMUSCULAR; INTRAVENOUS; SUBCUTANEOUS at 14:45

## 2018-11-06 RX ADMIN — LIDOCAINE HYDROCHLORIDE 100 MG: 20 INJECTION, SOLUTION INFILTRATION; PERINEURAL at 07:39

## 2018-11-06 RX ADMIN — MIDAZOLAM HYDROCHLORIDE 1 MG: 1 INJECTION, SOLUTION INTRAMUSCULAR; INTRAVENOUS at 11:52

## 2018-11-06 RX ADMIN — POTASSIUM CHLORIDE, DEXTROSE MONOHYDRATE AND SODIUM CHLORIDE 30 ML/HR: 150; 5; 450 INJECTION, SOLUTION INTRAVENOUS at 14:23

## 2018-11-06 RX ADMIN — FENTANYL CITRATE 50 MCG: 50 INJECTION, SOLUTION INTRAMUSCULAR; INTRAVENOUS at 21:37

## 2018-11-06 RX ADMIN — PHENYLEPHRINE HYDROCHLORIDE 0.25 MCG/KG/MIN: 10 INJECTION, SOLUTION INTRAMUSCULAR; INTRAVENOUS; SUBCUTANEOUS at 08:14

## 2018-11-06 RX ADMIN — PHENYLEPHRINE HYDROCHLORIDE 100 MCG: 10 INJECTION, SOLUTION INTRAMUSCULAR; INTRAVENOUS; SUBCUTANEOUS at 08:24

## 2018-11-06 ASSESSMENT — ACTIVITIES OF DAILY LIVING (ADL)
ADLS_ACUITY_SCORE: 9
ADLS_ACUITY_SCORE: 10
ADLS_ACUITY_SCORE: 10
ADLS_ACUITY_SCORE: 9

## 2018-11-06 ASSESSMENT — COPD QUESTIONNAIRES: COPD: 1

## 2018-11-06 ASSESSMENT — LIFESTYLE VARIABLES: TOBACCO_USE: 1

## 2018-11-06 NOTE — ANESTHESIA POSTPROCEDURE EVALUATION
Patient: David Richard Schoenecker    Procedure(s):  CORONARY ARTERY BYPASS GRAFTING x 3 (LIMA - LAD; SV - OM; SV - PDA) WITH ENDOSCOPIC VEIN HARVEST ON THE LEFT LOWER EXTREMITY    (ON PUMP OXYGENATOR ; BAL BY KEYA)       Diagnosis:CORONARY ARTERY DISEASE  Diagnosis Additional Information: No value filed.    Anesthesia Type:  General, ETT    Note:  Anesthesia Post Evaluation    Patient location during evaluation: ICU  Patient participation: Unable to participate in evaluation secondary to underlying medical condition  Post-procedure mental status: asleep.  Pain management: unable to assess  Airway patency: patent  Cardiovascular status: acceptable  Respiratory status: ventilator and ETT  Hydration status: acceptable  PONV: unable to assess             Last vitals:  Vitals:    11/06/18 1200 11/06/18 1600 11/06/18 1623   BP:      Pulse:      Resp:  22    Temp:  36.9  C (98.4  F)    SpO2: 100%  100%         Electronically Signed By: Elisabeth Sterling  November 6, 2018  4:34 PM

## 2018-11-06 NOTE — ANESTHESIA CARE TRANSFER NOTE
Patient: David Richard Schoenecker    Procedure(s):  CORONARY ARTERY BYPASS GRAFTING x 3 (LIMA - LAD; SV - OM; SV - PDA) WITH ENDOSCOPIC VEIN HARVEST ON THE LEFT LOWER EXTREMITY    (ON PUMP OXYGENATOR ; BAL BY KEYA)       Diagnosis: CORONARY ARTERY DISEASE  Diagnosis Additional Information: No value filed.    Anesthesia Type:   General, ETT     Note:  Airway :ETT  Patient transferred to:ICU  Comments: VSSICU Handoff: Call for PAUSE to initiate/utilize ICU HANDOFF, Identified Patient, Identified Responsible Provider, Reviewed the Pertinent Medical History, Discussed Surgical Course, Reviewed Intra-OP Anesthesia Management and Issues during Anesthesia, Set Expectations for Post Procedure Period and Allowed Opportunity for Questions and Acknowledgement of Understanding      Vitals: (Last set prior to Anesthesia Care Transfer)    CRNA VITALS  11/6/2018 1149 - 11/6/2018 1243      11/6/2018             Resp Rate (observed): 14    Resp Rate (set): 14                Electronically Signed By: RAVI Velasquez CRNA  November 6, 2018  12:43 PM

## 2018-11-06 NOTE — PROGRESS NOTES
Yesterday, patient transferred to ICU with IABP following coronary angiogram which showed severe three vessel coronary artery disease. Cardiothoracic surgery consulted and have recommended CABG. Patient went to OR this morning, and is expected to return to the ICU following surgery with the ICU service as the primary service. The hospitalist service will sign off, please do not hesitate to reconsult us once patient is stable to discharge form the ICU.

## 2018-11-06 NOTE — H&P
SURGICAL ICU H&P  November 6, 2018      CO-MORBIDITIES:   S/P CABG (coronary artery bypass graft)  (primary encounter diagnosis)  NSTEMI (non-ST elevated myocardial infarction) (H)  Pneumonia due to infectious organism, unspecified laterality, unspecified part of lung  Tobacco use (1ppd)    ASSESSMENT: David Richard Schoenecker is a 68 year old male POD # 0 s/p CABG- admitted to the hospital on 11/4 with dyspnea at rest and subsequent chest pain- diagnosed with NSTEMI.  A Chest CT was obtained to evaluate for aortic dissection- this revealed nodular opacities concerning for infection vs malignant process.  He was then taken to the Cath lab on 11/5- severe 3 vessel disease was seen-no stenting was performed.  An IABP was placed and he was brought to the ICU.  On 11/6 he then underwent CABG x3-on pump.  Procedure was relatively uncomplicated.  Was transfused platelets due to thrombocytopenia..  He comes to the ICU intubated, IABP remains in place and on epi/phenyl and amicar infusion.    PLAN:  Neuro/ pain/ sedation:  - Monitor neurological status. Notify the MD for any acute changes in exam.  - fentanyl PRN, scheduled tylenol for pain.  - propofol for sedation.    Pulmonary care:   - Mechanically vented.  CMV with TV of 520 and RR 15, PEEP 5.  Wean O2 sats as able  - Will plan for spont breathing trial once recovered from anesthesia  - On steroids per pulm consult, being treated with ceftriaxone for possible infectious pulm process    Cardiovascular:    - Now status post urgent 3 vessel Cabg on 11/6.  Had been on IABP preop- this continues post op.  MAP of 80 while on epi and phenyl infusions.  HR in 80s without pacing.  -On ASA/statin  -Metoprolol once off vasopressors  -Wean vasopressors as able.  -On IABP currently with adequate MAPs-will discuss with surgical team about when to commence weaning off of the pump      GI care:   - NPO immediately post op  - On PPI      Fluids/ Electrolytes/ Nutrition:   - Low  rate IV fluid plus multiple drips  - Electrolyte replacement protocol  - No indication for parenteral nutrition.    Renal/ Fluid Balance:    - Urine output appears adequate so far since operation.  - Will continue to monitor intake and output.  - Plan to diuresis over next 1-2 days  - Continue kong for strict I's and O's    Endocrine:    - Insulin drip    ID/ Antibiotics:  - Being treated with ceftriaxone (concern for pulmonary infectious process)  - monitor WBC/fever curve    Heme:     - On amicar infusion, serosang chest tube drainage  - Trend H/H    MSK:  -PT/OT    Prophylaxis:    - Mechanical prophylaxis for DVT.   - No chemical DVT prophylaxis due to high risk of bleeding today  - H2 blocker ordered    Lines/ tubes/ drains:  - PIVs  - Kong for strict I's and O's  - Right internal jugular central line  - Arterial line  - ET tube  - Mediastinal drains  - IABP in place    Disposition:  - ICU    - - - - - - - - - - - - - - - - - - - - - - - - - - - - - - - - - - - - - - - - - - - - - - - - - - - - - - - - -     HISTORY PRESENTING ILLNESS: David Richard Schoenecker is a 68 year old male POD # 0 s/p CABG- admitted to the hospital on 11/4 with dyspnea at rest and subsequent chest pain- diagnosed with NSTEMI.  A Chest CT was obtained to evaluate for aortic dissection- this revealed nodular opacities concerning for infection vs malignant process.  He was then taken to the Cath lab on 11/5- severe 3 vessel disease was seen-no stenting was performed.  An IABP was placed and he was brought to the ICU.  On 11/6 he then underwent CABG x3-on pump.  Procedure was relatively uncomplicated.  Was transfused platelets due to thrombocytopenia..  He comes to the ICU intubated, IABP remains in place and on epi/phenyl and amicar infusion.    REVIEW OF SYSTEMS: Unable to obtain- intubated    PAST MEDICAL HISTORY:   Past Medical History:   Diagnosis Date     Heart disease      High cholesterol      Hypertension        SURGICAL  HISTORY:   Past Surgical History:   Procedure Laterality Date     APPENDECTOMY       ENT SURGERY      tonsillectomy       SOCIAL HISTORY: unable to obtain-intubated     FAMILY HISTORY:  unable to obtain-intubated     ALLERGIES:    No Known Allergies    MEDICATIONS:    No current facility-administered medications on file prior to encounter.   No current outpatient prescriptions on file prior to encounter.    PHYSICAL EXAMINATION:  Temp:  [98.1  F (36.7  C)-99.1  F (37.3  C)] 99.1  F (37.3  C)  Pulse:  [77] 77  Heart Rate:  [] 72  Resp:  [9-27] 14  BP: (102-117)/(52-62) 102/52  MAP:  [83 mmHg-127 mmHg] 83 mmHg  Arterial Line BP: (106-183)/(48-76) 112/57  SpO2:  [90 %-100 %] 95 %    General: intubated and sedated.  Neuro: No focal neuro deficits noted.   Respiratory:On vent without spontaneous respirations.  with RR 15, PEEP 5  Cardiovascular: Regular rate and rhythm- in 80s with MAP in 80s.  IABP in place  Gastrointestinal: Abdomen soft, non-distended  Chest: Incision covered with dry dressings.  Serosang fluid in 2 mediastinal drains-on suction  Extremities: No limb deformities. No pedal edema.  ACE wrap along left leg  Skin: As noted above. No rashes or lesions appreciated.    LABS: Reviewed.   Arterial Blood Gases     Recent Labs  Lab 11/06/18  1131 11/06/18  1130 11/06/18  1058 11/06/18  1032   PH 7.18* 7.20* 7.32* 7.30*   PCO2 56* 54* 43 44   PO2 307* 290* 383* 379*   HCO3 21 21 22 22     Complete Blood Count     Recent Labs  Lab 11/06/18  1130 11/06/18  1058 11/06/18  1032 11/06/18  1000  11/06/18  0300 11/04/18  0848 11/04/18  0338   WBC 6.8  --   --   --   --  11.6* 14.5* 13.9*   HGB 8.8*  9.9* 10.2* 9.5* 9.9*  < > 11.9* 12.8* 14.6   PLT 73*  --   --   --   --  98* 112* 129*   < > = values in this interval not displayed.  Basic Metabolic Panel    Recent Labs  Lab 11/06/18  1130 11/06/18  1058 11/06/18  1032 11/06/18  1000  11/06/18  0300  11/04/18  0338    141 139 142  < > 142  --  140    POTASSIUM 4.6 5.7* 5.6* 5.4*  < > 4.2  < > 4.0   CHLORIDE  --   --   --   --   --  113*  --  107   CO2  --   --   --   --   --  22  --  25   BUN  --   --   --   --   --  31*  --  19   CR  --   --   --   --   --  0.94  --  1.03   GLC  --   --   --   --   --  105*  --  132*   < > = values in this interval not displayed.  Liver Function Tests    Recent Labs  Lab 11/04/18  0338   AST 34   ALT 19   ALKPHOS 87   BILITOTAL 1.1   ALBUMIN 3.9     Pancreatic Enzymes  No lab results found in last 7 days.  Coagulation Profile  No lab results found in last 7 days.  Lactate  Invalid input(s): LACTATE    IMAGING:  Results for orders placed or performed during the hospital encounter of 11/04/18   Chest XR,  PA & LAT    Narrative    CHEST TWO VIEWS 11/4/2018 4:23 AM     HISTORY: Cough and shortness of breath.     COMPARISON: None.    FINDINGS: Heart size and pulmonary vascularity are within normal  limits. The lungs are clear. No pneumothorax or pleural effusion.       Impression    IMPRESSION: No radiographic evidence of acute chest abnormality.     MONICA BAJWA MD   CT Aortic Survey w Contrast    Narrative    CT AORTIC SURVEY WITH CONTRAST   11/4/2018 6:14 AM     HISTORY: Shortness of breath, elevated troponin, hypotension; evaluate  for aortic dissection.     TECHNIQUE:  80 mL Isovue-370. Noncontrast CT images of the chest were  followed by arterial phase images of the chest, abdomen, and pelvis.  Radiation dose for this scan was reduced using automated exposure  control, adjustment of the mA and/or kV according to patient size, or  iterative reconstruction technique.    COMPARISON: None.    FINDINGS: There is no aortic dissection. The thoracic aorta is normal  in caliber. There is conventional three-vessel anatomy of the arch.  Mild thoracic aortic atherosclerosis. There is moderate coronary  atherosclerosis. There is ectasia of the infrarenal abdominal aorta  that measures a maximum of 2.8 cm. There is mild abdominal  aortic  atherosclerosis without significant stenosis. There is less than 50%  stenosis in bilateral common iliac arteries. Bilateral external iliac  arteries and common femoral arteries are patent.    CHEST: No pleural or pericardial effusion. No pneumothorax. There are  enlarged left hilar and mediastinal lymph nodes. A left hilar lymph  node measures 2.8 x 1.6 cm (series 4, image 50). A subcarinal lymph  node measures 1.9 x 1.2 cm. No right hilar or axillary adenopathy.  There are nodular airspace opacities throughout both lungs. The  nodules are surrounded by groundglass opacity. Largest nodule is a  left lower lobe nodule that measures up to 1.1 cm (series 6, image  50).    Abdomen and pelvis: The liver, spleen, pancreas, and adrenal glands  are unremarkable. Small gallstone noted in the gallbladder. There are  nonobstructing calculi in both kidneys, measuring up to 6 mm in the  right kidney and 10 mm in the left kidney. Small bilateral renal cysts  are noted. No evidence of solid renal mass. Patient has a small hiatal  hernia. No bowel obstruction, free air, or ascites. No abdominal or  retroperitoneal lymphadenopathy. There is a fat-containing umbilical  hernia. No pelvic adenopathy, free fluid, or mass.    No suspicious bone abnormalities.      Impression    IMPRESSION:  1. No evidence of aortic dissection.  2. Nodular bilateral airspace opacities surrounded by groundglass  opacity. There is associated mediastinal and left hilar adenopathy.  Radiographic findings suggestive of a fungal infection, although  multifocal bacterial pneumonia or metastatic disease can have a  similar appearance.  3. Nonobstructing bilateral nephrolithiasis.  4. Small hiatal hernia.    MONICA BAJWA MD   US Carotid Bilateral    Narrative    BILATERAL CAROTID ULTRASOUND November 5, 2018 6:39 PM     HISTORY: Preoperative coronary artery bypass graft.      COMPARISON: None.    RIGHT CAROTID FINDINGS: Moderate calcified and  noncalcified plaque  with shadowing. Abnormal waveforms due to balloon pump.  Right ICA PSV:  279  cm/sec.  Right ICA EDV:  0 cm/sec.  Right ICA/CCA PSV Ratio:  2.2.    These indicate greater than 70% diameter stenosis of the right ICA.    Right Vertebral: Antegrade flow.   Right ECA: Antegrade flow.     LEFT CAROTID FINDINGS: Mild noncalcified plaque.  Left ICA PSV:  151  cm/sec.  Left ICA EDV:  0 cm/sec.  Left ICA/CCA PSV Ratio:  1.0.    These indicate  50 - 69%  diameter stenosis of the left ICA.    Left Vertebral: Antegrade flow.   Left ECA: Antegrade flow.     Causes of Decreased Accuracy: None.       Impression    IMPRESSION:    1. Greater than 70% diameter stenosis of the right internal carotid  artery relative to the distal internal carotid artery diameter.  2. 50-69% diameter stenosis of the left internal carotid artery  relative to the distal internal carotid artery diameter.   3. Abnormal waveforms due to balloon pump.    DEVI VARELA MD   US Lower Extremity Venous Mapping Left    Narrative    ULTRASOUND LEFT LOWER EXTREMITY VENOUS MAPPING November 6, 2018 6:40  PM     HISTORY: Preoperative coronary artery bypass graft.     COMPARISON: None.    FINDINGS:  Left great saphenous vein in the calf ranges between 2.5 mm and 3.5  mm.  Left great saphenous vein in the thigh ranges between 2.9 mm and 6.8  mm.      Impression    IMPRESSION: Left great saphenous vein mapping with measurements as  above.    PHANI BUTCHER DO

## 2018-11-06 NOTE — CONSULTS
Consult Date:  11/05/2018      REFERRING CARDIOLOGIST:  Dr. Butch Sellers.      CURRENT DIAGNOSIS:  Severe 3-vessel coronary artery disease      REASON FOR CONSULTATION:  Evaluation for severe 3-vessel coronary artery disease and coronary artery bypass grafting.      HISTORY OF PRESENT ILLNESS:  Mr. Schoenecker is a very pleasant 68-year-old gentleman, active heavy smoker, and a significant family history of premature coronary artery disease who was admitted to the hospital several days ago with acute onset shortness of breath and burning retrosternal chest pain.  In retrospect, he had been having multiple episodes of exertional angina for at least the past couple of weeks.  He was diagnosed with a non-ST elevation MI.  Coronary angiogram was performed today by Dr. Sellers that demonstrated severe 3-vessel coronary artery disease including significant left main disease.  An intraaortic balloon pump was placed in the Cath Lab and patient was brought to the ICU in medical stable condition, chest free.  I was asked to evaluate the patient for coronary artery bypass grafting.      PAST MEDICAL HISTORY/SURGICAL HISTORY:  Hyperlipidemia, appendectomy, tonsillectomy.      ALLERGIES:  NO KNOWN DRUG ALLERGIES.      CURRENT MEDICATIONS:  Reviewed in the chart.  The patient is currently on a Heparin drip.      FAMILY HISTORY:  Significant for premature coronary artery disease.      SOCIAL HISTORY:  He is a pack a day smoker and has done so for decades, up until the day of admission.  Denies any significant alcohol use.  He is a retired and  and is otherwise active.      REVIEW OF SYSTEMS:  As per HPI.  All other 10-point review of systems are completed and were otherwise negative unless stated above.      PHYSICAL EXAMINATION:   VITAL SIGNS:  Temperature is 98.4, heart rate 90, respirations 17, satting 98% on room air, blood pressure is 102/70.   GENERAL:  He appears well, in no acute distress.  Currently denies any  chest pain or shortness of breath.  His intraoperative balloon pump was augmenting well at a 1:1 ratio.   CARDIOVASCULAR:  Regular rate and rhythm, normal S1 and S2, no murmurs.   LUNGS:  Clear bilaterally.   ABDOMEN:  Soft, nontender, nondistended.   EXTREMITIES:  Negative for edema, cyanosis or clubbing.   NEUROLOGIC:  He is A and O x 3 with no focal deficits.      LABORATORY STUDIES:  Coronary angiogram performed earlier this evening by Dr. Sellers demonstrates severe 3-vessel coronary artery disease with 95% thrombotic left main stenosis involving the ostial LAD, circumflex and the ramus.  The mid-LAD also has a discrete 80% stenosis and 85% distal LAD stenosis as well.  The first diagonal branch also has a 70% stenosis, but it is a small vessel.  The circumflex coronary artery also appears to be occluded at its ostium and he has faint right-to-left collaterals.  The RCA is a moderate size codominant vessel that also has a 70% mid stenosis and 99% discrete distal RCA disease as well.  His LVEDP is 31 mmHg.        A transthoracic echo performed yesterday demonstrated moderate LV dysfunction with overall LVEF at 30-35%, trace mitral valve regurgitation and tricuspid valve regurgitation.  No aortic stenosis or insufficiency.        Carotid ultrasound is ordered and pending.        The patient had an aortic survey CT scan performed yesterday that demonstrated enlarged left hilar lymph nodes and multiple nodular bilateral airspace opacities surrounded by ground-glass opacity suggestive of a fungal infection, although multifocal bacterial pneumonia or metastatic disease cannot be excluded.        His blood chemistry studies from earlier today were reviewed and in chart.  His hemoglobin is 12.8, platelet count of 112.  Most recent troponin level was 17.8.  His creatinine yesterday was 1.03.  His LFTs are all within normal limits.      IMPRESSION AND PLAN:  Mr. Schoenecker is a 68-year-old gentleman with newly diagnosed  severe 3-vessel coronary artery disease in the setting of a non-ST elevation MI and a 95% thrombotic left main stenosis.  He is currently hemodynamically stable with no chest pain with the intraaortic balloon pump.  I reviewed the angiographic findings with Dr. Sellers.  My recommendation is coronary artery bypass grafting.  I think the optimal timing will be tomorrow morning unless he develops chest pain overnight, which then we will take him to the operating room urgently.  I explained to the patient along with his family the diagnosis in detail and the reasons for recommending coronary artery bypass grafting.  I went over the risks and benefits of the operation and the potential complications associated with surgery.  These complications include but are not strictly limited to infection, bleeding, stroke, postop MI, postop arrhythmias, pulmonary or renal complications.  I think overall he is an excellent surgical candidate and quoted an operative Roger risk of around 2-3%.  The patient understands and agrees to proceed.  Of note, the patient had a CT scan of the chest performed yesterday that demonstrated some diffuse nodules bilaterally along with some mediastinal adenopathy.  This most likely represents a benign disease or even a fungal infection, but underlying malignancy cannot be excluded.  I think given his clinical picture, however, we will have to proceed with urgent coronary bypass surgery and workup his chest CT scan findings after he recovers from his bypass grafting.        It was a pleasure to meet Mr. Schoenecker today, and thank you very much for this referral.         LOUIS BACH MD             D: 2018   T: 2018   MT: ALIZA      Name:     SCHOENECKER, DAVID   MRN:      [id number]        Account:       [id number]   :      1950           Consult Date:  2018      Document: [id number]       cc: Butch Bach MD

## 2018-11-06 NOTE — PROGRESS NOTES
Pre-op heart/IS instruct completed. Pt achieved 1500 ml on IS done with good effort. Pt is unable to sit up due to balloon pump in place.  Will cont to follow after surgery.  11/5/2018  Bridgett Cooper RRT

## 2018-11-06 NOTE — PROGRESS NOTES
PULMONARY NOTE - Chart Check    Events reviewed since last seen.  Patient in OR for CABG.  Will see patient in follow-up again once transferred out of ICU.    Thank you,     Henok Do MD    [facility] / [facility]  [phone] (pager)  [phone] (office)

## 2018-11-06 NOTE — ANESTHESIA PROCEDURE NOTES
CENTRAL LINE INSERTION PROCEDURE NOTE:   Pre-Procedure  Performed by MATT ARANA  Location: OR      Pre-Anesthestic Checklist: patient identified, risks and benefits discussed and informed consent    Timeout  Correct Patient: Yes   Correct Procedure: Yes   Correct Site: Yes   Correct Laterality: N/A   Correct Position: Yes   Site Marked: N/A   .   Procedure Documentation   Procedure: central line (Introducer and PA Catheter)  Position: Trendelenburg  Patient Prep;all elements of maximal sterile barrier technique followed, mask, hat, sterile gown, sterile gloves, draped, hand hygiene, chlorhexidine gluconate and isopropyl alcohol, patient draped      Insertion Site:right, internal jugular  Using U/S with sterile probe cover and sterile gel, vein evaluated for patency/adequacy of cortis insertion is adequate, and using realtime U/S imaging the josue was punctured, and needle was observed entering vein on U/S. A permanent image is entered into the patient's record.   Skin infiltrated with mL of 1% lidocaine.  Catheter: PA Catheter, 9 Welsh, 10 cm (sheath introducer)  Assessment/Narrative         Secured by suture  Tegaderm and Biopatch dressing used.  blood aspirated from all lumens  All lumens flushed: Yes  Verification method: Placement to be verified post-op  Comments:  PA Catheter  No complications

## 2018-11-06 NOTE — PLAN OF CARE
Problem: Patient Care Overview  Goal: Plan of Care/Patient Progress Review  Outcome: No Change  N: Intact, PERRL. Prn dilaudid given last evening for back pain.  CV: Tele SR with infrequent PVCs. IABP in place. Augmented pressures intermittently high; MDs aware. 2+ pulses in UE and LE.  Pulm: Expiratory wheezes present, prn neb given. O2 sats > 95% on 3L nasal cannula.  GI/: Steinberg patent with borderline low UO this am. NPO since 0000 for CABG this am.  Endocrine: BG < 150.  Skin: Intact.    Pt and family updated on plan of care; pt is 0720 OR case this am. Will continue to monitor.

## 2018-11-06 NOTE — ANESTHESIA PREPROCEDURE EVALUATION
Procedure: Procedure(s):  CORONARY ARTERY BYPASS GRAFTING(ON PUMP,NO BAL,NO BALLOON)(CARDIOLOGIST:DR MONTE)  Preop diagnosis: CORONARY ARTERY DISEASE    No Known Allergies  Past Medical History:   Diagnosis Date     High cholesterol      Past Surgical History:   Procedure Laterality Date     APPENDECTOMY       ENT SURGERY      tonsillectomy     Social History   Substance Use Topics     Smoking status: Current Every Day Smoker     Packs/day: 0.75     Smokeless tobacco: Never Used     Alcohol use No     Prior to Admission medications    Medication Sig Start Date End Date Taking? Authorizing Provider   loratadine (CLARITIN) 10 MG tablet Take 10 mg by mouth daily   Yes Unknown, Entered By History   naproxen sodium 220 MG capsule Take 220 mg by mouth 2 times daily (with meals)   Yes Unknown, Entered By History     Current Facility-Administered Medications Ordered in Epic   Medication Dose Route Frequency Last Rate Last Dose     [Auto Hold] acetaminophen (TYLENOL) Suppository 650 mg  650 mg Rectal Q4H PRN         acetaminophen (TYLENOL) tablet 325-650 mg  325-650 mg Oral Q4H PRN         [Auto Hold] alum & mag hydroxide-simethicone (MYLANTA ES/MAALOX  ES) suspension 30 mL  30 mL Oral Q4H PRN         aspirin EC tablet 81 mg  81 mg Oral Daily         [Auto Hold] bisacodyl (DULCOLAX) Suppository 10 mg  10 mg Rectal Daily PRN         ceFAZolin (ANCEF) 1 g vial to attach to  ml bag for ADULT or 50 ml bag for PEDS  1 g Intravenous See Admin Instructions         ceFAZolin (ANCEF) intermittent infusion 2 g in 100 mL dextrose PRE-MIX  2 g Intravenous Pre-Op/Pre-procedure x 1 dose         [Auto Hold] cefTRIAXone (ROCEPHIN) 1 g vial to attach to  mL bag for ADULTS or NS 50 mL bag for PEDS  1 g Intravenous Q24H   1 g at 11/06/18 0531     [Auto Hold] Continuing statin from home medication list OR statin order already placed during this visit   Does not apply DOES NOT GO TO MAR         heparin infusion 25,000 units in 0.45%  NaCl 250 mL  1,100 Units/hr Intravenous Continuous   Stopped at 11/06/18 0200     HOLD:  Metformin and metformin containing medications if patient received IV contrast with acute kidney injury or severe chronic kidney disease (stage IV or stage V; i.e., eGFR less than 30)   Does not apply HOLD         HOLD: All meds AM of surgery except: any Beta-blocker, Insulin, and Ranitidine (Zantac)   Does not apply HOLD         HOLD: Clopidogrel (PLAVIX) 5 days prior to surgery   Does not apply HOLD         HOLD: Dabigatran (PRADAXA) 5 days prior to surgery   Does not apply HOLD         HOLD: Eptifibatide (INTEGRILIN) 12 hours prior to surgery   Does not apply HOLD         HOLD: Heparin 2 hours prior to surgery   Does not apply HOLD         HOLD: NSAIDS 7 days before surgery (except for aspirin, unless otherwise instructed)   Does not apply HOLD         HOLD: Prasugrel (EFFIENT) 7 days prior to surgery   Does not apply HOLD         HOLD: Rivaroxaban (XARELTO) 3 days prior to surgery   Does not apply HOLD         HOLD: Ticagrelor (BRILINTA) 5 days prior to surgery   Does not apply HOLD         HYDROcodone-acetaminophen (NORCO) 5-325 MG per tablet 1-2 tablet  1-2 tablet Oral Q4H PRN         [Auto Hold] HYDROmorphone (PF) (DILAUDID) injection 0.2 mg  0.2 mg Intravenous Q2H PRN   0.2 mg at 11/05/18 1722     [Auto Hold] ipratropium - albuterol 0.5 mg/2.5 mg/3 mL (DUONEB) neb solution 3 mL  3 mL Nebulization Q4H PRN   3 mL at 11/06/18 0012     lidocaine (LMX4) cream   Topical Q1H PRN         lidocaine 1 % 1 mL  1 mL Other Q1H PRN         [Auto Hold] lidocaine 1 % 1 mL  1 mL Other Q1H PRN         [Auto Hold] lidocaine 2 % (URO-JET) jelly 10 mL  10 mL Urethral Once   Stopped at 11/06/18 0518     [Auto Hold] lisinopril (PRINIVIL/Zestril) tablet 2.5 mg  2.5 mg Oral Daily   Stopped at 11/06/18 0900     mupirocin (BACTROBAN) 2 % ointment   Both Nostrils BID         [Auto Hold] naloxone (NARCAN) injection 0.1-0.4 mg  0.1-0.4 mg Intravenous  Q2 Min PRN         [Auto Hold] nicotine (NICODERM CQ) 14 MG/24HR 24 hr patch 1 patch  1 patch Transdermal Daily   Stopped at 11/06/18 0900     [Auto Hold] nicotine Patch in Place   Transdermal Q8H         [Auto Hold] nicotine patch REMOVAL   Transdermal Daily         [Auto Hold] nitroGLYcerin (NITROSTAT) sublingual tablet 0.4 mg  0.4 mg Sublingual Q5 Min PRN         [Auto Hold] ondansetron (ZOFRAN-ODT) ODT tab 4 mg  4 mg Oral Q6H PRN        Or     [Auto Hold] ondansetron (ZOFRAN) injection 4 mg  4 mg Intravenous Q6H PRN         [Auto Hold] oxyCODONE IR (ROXICODONE) tablet 5-10 mg  5-10 mg Oral Q3H PRN         [Auto Hold] polyethylene glycol (MIRALAX/GLYCOLAX) Packet 17 g  17 g Oral Daily PRN         [Auto Hold] predniSONE (DELTASONE) tablet 40 mg  40 mg Oral Daily   Stopped at 11/06/18 0900     [Auto Hold] Reason ACE/ARB/ARNI order not selected   Other DOES NOT GO TO MAR         [Auto Hold] Reason beta blocker not prescribed   Does not apply DOES NOT GO TO MAR         [Auto Hold] rosuvastatin (CRESTOR) tablet 40 mg  40 mg Oral Daily   40 mg at 11/05/18 2112     [Auto Hold] senna-docusate (SENOKOT-S;PERICOLACE) 8.6-50 MG per tablet 1 tablet  1 tablet Oral BID PRN        Or     [Auto Hold] senna-docusate (SENOKOT-S;PERICOLACE) 8.6-50 MG per tablet 2 tablet  2 tablet Oral BID PRN         sodium chloride (PF) 0.9% PF flush 3 mL  3 mL Intracatheter Q1H PRN         sodium chloride (PF) 0.9% PF flush 3 mL  3 mL Intracatheter Q8H   3 mL at 11/06/18 0421     sodium chloride 0.9% infusion   Intravenous Continuous 75 mL/hr at 11/06/18 0048       No current [facility]-ordered outpatient prescriptions on file.       [Auto Hold] - MEDICATION INSTRUCTIONS -       HEParin Stopped (11/06/18 0200)     [Auto Hold] Reason ACE/ARB/ARNI order not selected       [Auto Hold] Reason beta blocker order not selected       sodium chloride 75 mL/hr at 11/06/18 0048     Wt Readings from Last 1 Encounters:   11/06/18 102.3 kg (225 lb 8.5 oz)     Temp  Readings from Last 1 Encounters:   11/06/18 37.3  C (99.1  F)     BP Readings from Last 6 Encounters:   11/05/18 97/74     Pulse Readings from Last 4 Encounters:   11/04/18 96     Resp Readings from Last 1 Encounters:   11/06/18 12     SpO2 Readings from Last 1 Encounters:   11/06/18 93%     Recent Labs   Lab Test  11/06/18   0300  11/05/18   0525  11/04/18   0338   NA  142   --   140   POTASSIUM  4.2  4.2  4.0   CHLORIDE  113*   --   107   CO2  22   --   25   ANIONGAP  7   --   8   GLC  105*   --   132*   BUN  31*   --   19   CR  0.94   --   1.03   CONOR  8.1*   --   9.0     Recent Labs   Lab Test  11/04/18   0338   AST  34   ALT  19   ALKPHOS  87   BILITOTAL  1.1     Recent Labs   Lab Test  11/06/18   0300  11/04/18   0848   WBC  11.6*  14.5*   HGB  11.9*  12.8*   PLT  98*  112*     Recent Labs   Lab Test  11/05/18   1745   ABO  O   RH  Pos     No results for input(s): INR, PTT in the last 89190 hours.   Recent Labs   Lab Test  11/04/18   1128  11/04/18   0848  11/04/18   0338   TROPI  17.825*  13.120*  2.420*     No results for input(s): PH, PCO2, PO2, HCO3 in the last 20441 hours.  No results for input(s): HCG in the last 73048 hours.  Recent Results (from the past 744 hour(s))   Chest XR,  PA & LAT    Narrative    CHEST TWO VIEWS 11/4/2018 4:23 AM     HISTORY: Cough and shortness of breath.     COMPARISON: None.    FINDINGS: Heart size and pulmonary vascularity are within normal  limits. The lungs are clear. No pneumothorax or pleural effusion.       Impression    IMPRESSION: No radiographic evidence of acute chest abnormality.     MONICA BAJWA MD   CT Aortic Survey w Contrast    Narrative    CT AORTIC SURVEY WITH CONTRAST   11/4/2018 6:14 AM     HISTORY: Shortness of breath, elevated troponin, hypotension; evaluate  for aortic dissection.     TECHNIQUE:  80 mL [id number]. Noncontrast CT images of the chest were  followed by arterial phase images of the chest, abdomen, and pelvis.  Radiation dose for this  scan was reduced using automated exposure  control, adjustment of the mA and/or kV according to patient size, or  iterative reconstruction technique.    COMPARISON: None.    FINDINGS: There is no aortic dissection. The thoracic aorta is normal  in caliber. There is conventional three-vessel anatomy of the arch.  Mild thoracic aortic atherosclerosis. There is moderate coronary  atherosclerosis. There is ectasia of the infrarenal abdominal aorta  that measures a maximum of 2.8 cm. There is mild abdominal aortic  atherosclerosis without significant stenosis. There is less than 50%  stenosis in bilateral common iliac arteries. Bilateral external iliac  arteries and common femoral arteries are patent.    CHEST: No pleural or pericardial effusion. No pneumothorax. There are  enlarged left hilar and mediastinal lymph nodes. A left hilar lymph  node measures 2.8 x 1.6 cm (series 4, image 50). A subcarinal lymph  node measures 1.9 x 1.2 cm. No right hilar or axillary adenopathy.  There are nodular airspace opacities throughout both lungs. The  nodules are surrounded by groundglass opacity. Largest nodule is a  left lower lobe nodule that measures up to 1.1 cm (series 6, image  50).    Abdomen and pelvis: The liver, spleen, pancreas, and adrenal glands  are unremarkable. Small gallstone noted in the gallbladder. There are  nonobstructing calculi in both kidneys, measuring up to 6 mm in the  right kidney and 10 mm in the left kidney. Small bilateral renal cysts  are noted. No evidence of solid renal mass. Patient has a small hiatal  hernia. No bowel obstruction, free air, or ascites. No abdominal or  retroperitoneal lymphadenopathy. There is a fat-containing umbilical  hernia. No pelvic adenopathy, free fluid, or mass.    No suspicious bone abnormalities.      Impression    IMPRESSION:  1. No evidence of aortic dissection.  2. Nodular bilateral airspace opacities surrounded by groundglass  opacity. There is associated  mediastinal and left hilar adenopathy.  Radiographic findings suggestive of a fungal infection, although  multifocal bacterial pneumonia or metastatic disease can have a  similar appearance.  3. Nonobstructing bilateral nephrolithiasis.  4. Small hiatal hernia.    MONICA BAJWA MD           Anesthesia Evaluation     .             ROS/MED HX    ENT/Pulmonary:     (+)tobacco use, COPD, , . Other pulmonary disease some pulmonary pathology found on chest CT.  Unclear if bacterial, fungal, or less likely metatstatic disease.  Will be worked up at a later time.    Neurologic:       Cardiovascular: Comment: Balloon pump in place    LEFT MAIN CORONARY ARTERY: The left main has a thrombotic 95% stenosis  involving the LAD, circumflex and ramus.      LEFT ANTERIOR DESCENDING ARTERY: The proximal LAD has a 50% stenosis.  The mid LAD has discrete 80% stenosis. The distal LAD has a discrete  85% stenosis. The first diagonal branch is a small vessel with 70%  stenosis.      CIRCUMFLEX ARTERY: Circumflex appears to be occluded at its ostium.  There is a faint right to left collaterals which appears to be filling  the distal circumflex. There is a large ramus branch with 60% proximal  stenosis and 70% mid stenosis.      RIGHT CORONARY ARTERY: This is a moderate size codominant vessel. The  proximal segment has 40% stenosis. The mid segment has a 70% stenosis.  The distal RCA has a 99% discrete stenosis. The right PDA appears to  have mild to moderate disease.    The left ventricle is normal in size. There is mild eccentric left ventricular  hypertrophy. Left ventricular systolic function is moderately reduced. The  visual ejection fraction is estimated at 30-35%. Left ventricular diastolic  function is indeterminate. Apical, distal anterior, mid and distal lateral  severe hypokinesis. There are regional wall motion abnormalities as specified.  There is moderate inferolateral wall hypokinesis.     Right Ventricle  The right  ventricle is normal size. The right ventricular systolic function is  normal.     Atria  Normal left atrial size. Right atrial size is normal.     Mitral Valve  There is trace mitral regurgitation.        Tricuspid Valve  There is trace tricuspid regurgitation. The right ventricular systolic  pressure is approximated at 14.4 mmHg plus the right atrial pressure. Dilated  IVC (>2.5cm) with <50% respiratory collapse; right atrial pressure is  estimated at 15-20mmHg.     Aortic Valve  The aortic valve is not well visualized. No aortic regurgitation is present.  No aortic stenosis is present.     Pulmonic Valve  The pulmonic valve is not well visualized.     Vessels  The aortic root is normal size.     Pericardium  There is no pericardial effusion.    (+) Dyslipidemia, hypertension--CAD, -past MI,-. : . CHF Last EF: 30 . . :. .       METS/Exercise Tolerance:     Hematologic:         Musculoskeletal:         GI/Hepatic:     (+) GERD Asymptomatic on medication,      (-) liver disease   Renal/Genitourinary:      (-) renal disease   Endo:         Psychiatric:         Infectious Disease:         Malignancy:         Other:                     Physical Exam      Airway   Mallampati: II  TM distance: <3 FB  Neck ROM: full    Dental   (+) caps    Cardiovascular   Rhythm and rate: regular      Pulmonary    breath sounds clear to auscultation                    Anesthesia Plan      History & Physical Review  History and physical reviewed and following examination; no interval change.    ASA Status:  4 emergent.        Plan for General and ETT     Additional equipment: Arterial Line, Central Line and Videolaryngoscope      Postoperative Care      Consents  Anesthetic plan, risks, benefits and alternatives discussed with:  Patient..                          .

## 2018-11-06 NOTE — PLAN OF CARE
Problem: Patient Care Overview  Goal: Plan of Care/Patient Progress Review  OT/CR: Patient in the OR for a CABG. Rescheduling for tomorrow.

## 2018-11-06 NOTE — PROCEDURES
Procedure/Surgery Information   [facility]     Bedside Procedure Note  Date of Service (when I performed the procedure): 11/06/2018    PROCEDURE PERFORMED:  Arterial Catheter Site:  Left Radial Artery    PRIMARY INDICATION:  monitoring of blood pressure and monitoring of arterial oxygenation    PAUSE FOR THE CAUSE: Right patient: Yes      Right body part: Yes      Right procedure Yes    ULTRASOUND GUIDANCE:  Yes    DIAGNOSTIC DATA REVIEW:  Non-Applicable    H&P STATUS: H&P was reviewed, the patient was examined and no change has occurred in patient's condition since H&P was completed.    BARRIER PRECAUTIONS & STERILE TECHNIQUE  As documented in the Pre-Procedure Check List.    LOCAL ANESTHESIA:  None    NUMBER OF KITS USED:  1    STERILE DRESSINGS:  Applied    ESTIMATED BLOOD LOSS:  Minimal    SPECIMENS COLLECTED:  None    SPECIMENS SENT:  None    FOLLOW- UP CHEST X-RAY:  Not indicated  COMPLICATIONS:  none    PRIMARY PROCEDURALIST:   Dr. Cintron    SUPERVISING PHYSICIAN:  Dr. Pilar Cintron

## 2018-11-07 ENCOUNTER — APPOINTMENT (OUTPATIENT)
Dept: GENERAL RADIOLOGY | Facility: CLINIC | Age: 68
DRG: 233 | End: 2018-11-07
Attending: PHYSICIAN ASSISTANT
Payer: COMMERCIAL

## 2018-11-07 LAB
ALBUMIN UR-MCNC: 10 MG/DL
ANION GAP SERPL CALCULATED.3IONS-SCNC: 7 MMOL/L (ref 3–14)
APPEARANCE UR: CLEAR
ASPERGILLUS AB TITR SER CF: NORMAL {TITER}
B DERMAT AB SER-ACNC: 0.2 IV
BASE DEFICIT BLDA-SCNC: 3.4 MMOL/L
BILIRUB UR QL STRIP: NEGATIVE
BLD PROD TYP BPU: NORMAL
BLD PROD TYP BPU: NORMAL
BLD UNIT ID BPU: 0
BLOOD PRODUCT CODE: NORMAL
BPU ID: NORMAL
BUN SERPL-MCNC: 31 MG/DL (ref 7–30)
CA-I BLD-MCNC: 4.5 MG/DL (ref 4.4–5.2)
CA-I BLD-SCNC: 4.7 MG/DL (ref 4.4–5.2)
CALCIUM SERPL-MCNC: 7.7 MG/DL (ref 8.5–10.1)
CHLORIDE SERPL-SCNC: 118 MMOL/L (ref 94–109)
CO2 BLD-SCNC: 20 MMOL/L (ref 21–28)
CO2 SERPL-SCNC: 22 MMOL/L (ref 20–32)
COCCIDIOIDES AB TITR SER CF: NORMAL {TITER}
COLOR UR AUTO: YELLOW
CREAT SERPL-MCNC: 1.27 MG/DL (ref 0.66–1.25)
ERYTHROCYTE [DISTWIDTH] IN BLOOD BY AUTOMATED COUNT: 13.1 % (ref 10–15)
GFR SERPL CREATININE-BSD FRML MDRD: 56 ML/MIN/1.7M2
GLUCOSE BLDC GLUCOMTR-MCNC: 111 MG/DL (ref 70–99)
GLUCOSE BLDC GLUCOMTR-MCNC: 118 MG/DL (ref 70–99)
GLUCOSE BLDC GLUCOMTR-MCNC: 118 MG/DL (ref 70–99)
GLUCOSE BLDC GLUCOMTR-MCNC: 119 MG/DL (ref 70–99)
GLUCOSE BLDC GLUCOMTR-MCNC: 126 MG/DL (ref 70–99)
GLUCOSE BLDC GLUCOMTR-MCNC: 126 MG/DL (ref 70–99)
GLUCOSE BLDC GLUCOMTR-MCNC: 131 MG/DL (ref 70–99)
GLUCOSE BLDC GLUCOMTR-MCNC: 138 MG/DL (ref 70–99)
GLUCOSE BLDC GLUCOMTR-MCNC: 87 MG/DL (ref 70–99)
GLUCOSE BLDC GLUCOMTR-MCNC: 92 MG/DL (ref 70–99)
GLUCOSE BLDC GLUCOMTR-MCNC: 99 MG/DL (ref 70–99)
GLUCOSE SERPL-MCNC: 127 MG/DL (ref 70–99)
GLUCOSE UR STRIP-MCNC: NEGATIVE MG/DL
H CAPSUL MYC AB TITR SER CF: NORMAL {TITER}
H CAPSUL YST AB TITR SER CF: NORMAL {TITER}
HCO3 BLD-SCNC: 22 MMOL/L (ref 21–28)
HCT VFR BLD AUTO: 26.3 % (ref 40–53)
HCT VFR BLD CALC: 28 %PCV (ref 40–53)
HGB BLD CALC-MCNC: 9.5 G/DL (ref 13.3–17.7)
HGB BLD-MCNC: 8.9 G/DL (ref 13.3–17.7)
HGB UR QL STRIP: ABNORMAL
KETONES UR STRIP-MCNC: 10 MG/DL
LEUKOCYTE ESTERASE UR QL STRIP: ABNORMAL
MAGNESIUM SERPL-MCNC: 2.5 MG/DL (ref 1.6–2.3)
MCH RBC QN AUTO: 35.2 PG (ref 26.5–33)
MCHC RBC AUTO-ENTMCNC: 33.8 G/DL (ref 31.5–36.5)
MCV RBC AUTO: 104 FL (ref 78–100)
MUCOUS THREADS #/AREA URNS LPF: PRESENT /LPF
NITRATE UR QL: NEGATIVE
NUM BPU REQUESTED: 1
OXYHGB MFR BLD: 99 % (ref 92–100)
PCO2 BLD: 42 MM HG (ref 35–45)
PCO2 BLD: 45 MM HG (ref 35–45)
PH BLD: 7.25 PH (ref 7.35–7.45)
PH BLD: 7.33 PH (ref 7.35–7.45)
PH UR STRIP: 5.5 PH (ref 5–7)
PHOSPHATE SERPL-MCNC: 2.1 MG/DL (ref 2.5–4.5)
PHOSPHATE SERPL-MCNC: 2.2 MG/DL (ref 2.5–4.5)
PLATELET # BLD AUTO: 87 10E9/L (ref 150–450)
PO2 BLD: 252 MM HG (ref 80–105)
PO2 BLD: 368 MM HG (ref 80–105)
POTASSIUM BLD-SCNC: 4.9 MMOL/L (ref 3.4–5.3)
POTASSIUM SERPL-SCNC: 4 MMOL/L (ref 3.4–5.3)
POTASSIUM SERPL-SCNC: 4.4 MMOL/L (ref 3.4–5.3)
RBC # BLD AUTO: 2.53 10E12/L (ref 4.4–5.9)
RBC #/AREA URNS AUTO: 5 /HPF (ref 0–2)
SAO2 % BLDA FROM PO2: 100 % (ref 92–100)
SODIUM BLD-SCNC: 143 MMOL/L (ref 133–144)
SODIUM SERPL-SCNC: 147 MMOL/L (ref 133–144)
SOURCE: ABNORMAL
SP GR UR STRIP: 1.01 (ref 1–1.03)
TRANSFUSION STATUS PATIENT QL: NORMAL
TRANSFUSION STATUS PATIENT QL: NORMAL
UROBILINOGEN UR STRIP-MCNC: NORMAL MG/DL (ref 0–2)
WBC # BLD AUTO: 10.7 10E9/L (ref 4–11)
WBC #/AREA URNS AUTO: 8 /HPF (ref 0–5)

## 2018-11-07 PROCEDURE — 99233 SBSQ HOSP IP/OBS HIGH 50: CPT | Performed by: INTERNAL MEDICINE

## 2018-11-07 PROCEDURE — 25000128 H RX IP 250 OP 636: Performed by: PHYSICIAN ASSISTANT

## 2018-11-07 PROCEDURE — 84132 ASSAY OF SERUM POTASSIUM: CPT | Performed by: PHYSICIAN ASSISTANT

## 2018-11-07 PROCEDURE — 94640 AIRWAY INHALATION TREATMENT: CPT | Mod: 76

## 2018-11-07 PROCEDURE — 25000128 H RX IP 250 OP 636: Performed by: INTERNAL MEDICINE

## 2018-11-07 PROCEDURE — 25000125 ZZHC RX 250: Performed by: INTERNAL MEDICINE

## 2018-11-07 PROCEDURE — 25000125 ZZHC RX 250: Performed by: PHYSICIAN ASSISTANT

## 2018-11-07 PROCEDURE — 40000275 ZZH STATISTIC RCP TIME EA 10 MIN

## 2018-11-07 PROCEDURE — C9113 INJ PANTOPRAZOLE SODIUM, VIA: HCPCS | Performed by: PHYSICIAN ASSISTANT

## 2018-11-07 PROCEDURE — 25000132 ZZH RX MED GY IP 250 OP 250 PS 637: Performed by: HOSPITALIST

## 2018-11-07 PROCEDURE — 36415 COLL VENOUS BLD VENIPUNCTURE: CPT | Performed by: INTERNAL MEDICINE

## 2018-11-07 PROCEDURE — 20000003 ZZH R&B ICU

## 2018-11-07 PROCEDURE — 87086 URINE CULTURE/COLONY COUNT: CPT | Performed by: INTERNAL MEDICINE

## 2018-11-07 PROCEDURE — 25000125 ZZHC RX 250: Performed by: SURGERY

## 2018-11-07 PROCEDURE — 81001 URINALYSIS AUTO W/SCOPE: CPT | Performed by: INTERNAL MEDICINE

## 2018-11-07 PROCEDURE — 93010 ELECTROCARDIOGRAM REPORT: CPT | Performed by: INTERNAL MEDICINE

## 2018-11-07 PROCEDURE — 25000132 ZZH RX MED GY IP 250 OP 250 PS 637: Performed by: NURSE PRACTITIONER

## 2018-11-07 PROCEDURE — P9037 PLATE PHERES LEUKOREDU IRRAD: HCPCS | Performed by: PHYSICIAN ASSISTANT

## 2018-11-07 PROCEDURE — 94003 VENT MGMT INPAT SUBQ DAY: CPT

## 2018-11-07 PROCEDURE — 94640 AIRWAY INHALATION TREATMENT: CPT

## 2018-11-07 PROCEDURE — 80048 BASIC METABOLIC PNL TOTAL CA: CPT | Performed by: PHYSICIAN ASSISTANT

## 2018-11-07 PROCEDURE — 82330 ASSAY OF CALCIUM: CPT | Performed by: PHYSICIAN ASSISTANT

## 2018-11-07 PROCEDURE — 25000132 ZZH RX MED GY IP 250 OP 250 PS 637: Performed by: PHYSICIAN ASSISTANT

## 2018-11-07 PROCEDURE — 40000008 ZZH STATISTIC AIRWAY CARE

## 2018-11-07 PROCEDURE — 25000131 ZZH RX MED GY IP 250 OP 636 PS 637: Performed by: PHYSICIAN ASSISTANT

## 2018-11-07 PROCEDURE — 82805 BLOOD GASES W/O2 SATURATION: CPT | Performed by: SURGERY

## 2018-11-07 PROCEDURE — 84100 ASSAY OF PHOSPHORUS: CPT | Performed by: PHYSICIAN ASSISTANT

## 2018-11-07 PROCEDURE — 99233 SBSQ HOSP IP/OBS HIGH 50: CPT | Performed by: SURGERY

## 2018-11-07 PROCEDURE — 83735 ASSAY OF MAGNESIUM: CPT | Performed by: PHYSICIAN ASSISTANT

## 2018-11-07 PROCEDURE — 93005 ELECTROCARDIOGRAM TRACING: CPT

## 2018-11-07 PROCEDURE — 85027 COMPLETE CBC AUTOMATED: CPT | Performed by: PHYSICIAN ASSISTANT

## 2018-11-07 PROCEDURE — 87040 BLOOD CULTURE FOR BACTERIA: CPT | Performed by: INTERNAL MEDICINE

## 2018-11-07 PROCEDURE — 71045 X-RAY EXAM CHEST 1 VIEW: CPT

## 2018-11-07 PROCEDURE — 00000146 ZZHCL STATISTIC GLUCOSE BY METER IP

## 2018-11-07 RX ORDER — MAGNESIUM SULFATE HEPTAHYDRATE 40 MG/ML
4 INJECTION, SOLUTION INTRAVENOUS EVERY 4 HOURS PRN
Status: DISCONTINUED | OUTPATIENT
Start: 2018-11-07 | End: 2018-11-08

## 2018-11-07 RX ORDER — ACETAMINOPHEN 650 MG/1
650 SUPPOSITORY RECTAL ONCE
Status: COMPLETED | OUTPATIENT
Start: 2018-11-07 | End: 2018-11-07

## 2018-11-07 RX ADMIN — LIDOCAINE 2 PATCH: 560 PATCH PERCUTANEOUS; TOPICAL; TRANSDERMAL at 08:31

## 2018-11-07 RX ADMIN — IPRATROPIUM BROMIDE AND ALBUTEROL SULFATE 3 ML: 2.5; .5 SOLUTION RESPIRATORY (INHALATION) at 15:52

## 2018-11-07 RX ADMIN — ASPIRIN 325 MG: 325 TABLET, DELAYED RELEASE ORAL at 19:54

## 2018-11-07 RX ADMIN — HYDROMORPHONE HYDROCHLORIDE 0.5 MG: 1 INJECTION, SOLUTION INTRAMUSCULAR; INTRAVENOUS; SUBCUTANEOUS at 10:28

## 2018-11-07 RX ADMIN — SODIUM PHOSPHATE, MONOBASIC, MONOHYDRATE 15 MMOL: 276; 142 INJECTION, SOLUTION INTRAVENOUS at 20:13

## 2018-11-07 RX ADMIN — IPRATROPIUM BROMIDE AND ALBUTEROL SULFATE 3 ML: 2.5; .5 SOLUTION RESPIRATORY (INHALATION) at 07:16

## 2018-11-07 RX ADMIN — CHLORHEXIDINE GLUCONATE 15 ML: 1.2 RINSE ORAL at 08:22

## 2018-11-07 RX ADMIN — ACETAMINOPHEN 975 MG: 325 TABLET, FILM COATED ORAL at 19:54

## 2018-11-07 RX ADMIN — AZITHROMYCIN MONOHYDRATE 250 MG: 500 INJECTION, POWDER, LYOPHILIZED, FOR SOLUTION INTRAVENOUS at 14:11

## 2018-11-07 RX ADMIN — GABAPENTIN 100 MG: 100 CAPSULE ORAL at 21:51

## 2018-11-07 RX ADMIN — SENNOSIDES AND DOCUSATE SODIUM 1 TABLET: 8.6; 5 TABLET ORAL at 19:55

## 2018-11-07 RX ADMIN — HYDROMORPHONE HYDROCHLORIDE 0.5 MG: 1 INJECTION, SOLUTION INTRAMUSCULAR; INTRAVENOUS; SUBCUTANEOUS at 09:02

## 2018-11-07 RX ADMIN — IPRATROPIUM BROMIDE AND ALBUTEROL SULFATE 3 ML: 2.5; .5 SOLUTION RESPIRATORY (INHALATION) at 10:58

## 2018-11-07 RX ADMIN — PROPOFOL 50 MCG/KG/MIN: 10 INJECTION, EMULSION INTRAVENOUS at 07:02

## 2018-11-07 RX ADMIN — PHENYLEPHRINE HYDROCHLORIDE 0.5 MCG/KG/MIN: 10 INJECTION INTRAVENOUS at 14:02

## 2018-11-07 RX ADMIN — NICOTINE 1 PATCH: 14 PATCH, EXTENDED RELEASE TRANSDERMAL at 08:29

## 2018-11-07 RX ADMIN — CEFTRIAXONE 1 G: 1 INJECTION, POWDER, FOR SOLUTION INTRAMUSCULAR; INTRAVENOUS at 06:40

## 2018-11-07 RX ADMIN — ALBUTEROL SULFATE 6 PUFF: 90 AEROSOL, METERED RESPIRATORY (INHALATION) at 21:55

## 2018-11-07 RX ADMIN — SODIUM CHLORIDE 3 UNITS/HR: 9 INJECTION, SOLUTION INTRAVENOUS at 06:13

## 2018-11-07 RX ADMIN — HYDROMORPHONE HYDROCHLORIDE 0.5 MG: 1 INJECTION, SOLUTION INTRAMUSCULAR; INTRAVENOUS; SUBCUTANEOUS at 12:16

## 2018-11-07 RX ADMIN — IPRATROPIUM BROMIDE AND ALBUTEROL SULFATE 3 ML: 2.5; .5 SOLUTION RESPIRATORY (INHALATION) at 19:17

## 2018-11-07 RX ADMIN — SODIUM PHOSPHATE, MONOBASIC, MONOHYDRATE 15 MMOL: 276; 142 INJECTION, SOLUTION INTRAVENOUS at 06:36

## 2018-11-07 RX ADMIN — FENTANYL CITRATE 50 MCG: 50 INJECTION, SOLUTION INTRAMUSCULAR; INTRAVENOUS at 03:00

## 2018-11-07 RX ADMIN — ROSUVASTATIN CALCIUM 40 MG: 20 TABLET, FILM COATED ORAL at 21:51

## 2018-11-07 RX ADMIN — PROPOFOL 40 MCG/KG/MIN: 10 INJECTION, EMULSION INTRAVENOUS at 03:22

## 2018-11-07 RX ADMIN — HYDROMORPHONE HYDROCHLORIDE 0.3 MG: 1 INJECTION, SOLUTION INTRAMUSCULAR; INTRAVENOUS; SUBCUTANEOUS at 15:58

## 2018-11-07 RX ADMIN — OXYCODONE HYDROCHLORIDE 5 MG: 5 TABLET ORAL at 21:51

## 2018-11-07 RX ADMIN — PREDNISONE 40 MG: 20 TABLET ORAL at 18:50

## 2018-11-07 RX ADMIN — FENTANYL CITRATE 50 MCG: 50 INJECTION, SOLUTION INTRAMUSCULAR; INTRAVENOUS at 04:34

## 2018-11-07 RX ADMIN — METOPROLOL TARTRATE 5 MG: 5 INJECTION, SOLUTION INTRAVENOUS at 19:55

## 2018-11-07 RX ADMIN — FENTANYL CITRATE 50 MCG: 50 INJECTION, SOLUTION INTRAMUSCULAR; INTRAVENOUS at 05:41

## 2018-11-07 RX ADMIN — HYDROMORPHONE HYDROCHLORIDE 0.5 MG: 1 INJECTION, SOLUTION INTRAMUSCULAR; INTRAVENOUS; SUBCUTANEOUS at 04:54

## 2018-11-07 RX ADMIN — ACETAMINOPHEN 650 MG: 650 SUPPOSITORY RECTAL at 14:49

## 2018-11-07 RX ADMIN — POTASSIUM CHLORIDE 20 MEQ: 29.8 INJECTION, SOLUTION INTRAVENOUS at 05:16

## 2018-11-07 RX ADMIN — PANTOPRAZOLE SODIUM 40 MG: 40 INJECTION, POWDER, FOR SOLUTION INTRAVENOUS at 08:54

## 2018-11-07 ASSESSMENT — ACTIVITIES OF DAILY LIVING (ADL)
ADLS_ACUITY_SCORE: 10
ADLS_ACUITY_SCORE: 10
ADLS_ACUITY_SCORE: 12
ADLS_ACUITY_SCORE: 12
ADLS_ACUITY_SCORE: 10
ADLS_ACUITY_SCORE: 10

## 2018-11-07 ASSESSMENT — PAIN DESCRIPTION - DESCRIPTORS: DESCRIPTORS: HEAVINESS

## 2018-11-07 NOTE — PLAN OF CARE
Problem: Patient Care Overview  Goal: Plan of Care/Patient Progress Review  Outcome: No Change  End of Shift Nursing Summary  11/06/18 0700 to 1930   Neuro:  Awakens and agitated with sedation off. Moves feet to command. BUE movement but not to command. PERRL.  Pain:  PRN dilaudid and fentanyl given  CV:  SR. No arrhythmias. epinephrien and sol-syneprhine for BP and balloon pump remains 1:1.   Pulm: Lungs with wheezes in right fields. Diminihsed in bilateral bases. On full vent support.  GI/: Urine output adequate. No BM.  Endocrine: Insulin drip   Skin: Incision dressings CDI.   Lines/drips: 3 PIV. Balloon pump. PA catheter and left radial arterial line.   Additional:  CAB x3 today Family at bedside and updated.      *See EPIC flowsheet for full nursing assessment findings    Elise Teresa

## 2018-11-07 NOTE — PROGRESS NOTES
"CT surgery Progress Note    Severe 3 vessel coronary artery disease s/p CABG x 3 (LIMA to LAD, SVG to OM, SVG to PDA) POD #1    Subjective:  Intubated and sedated, stable over night    Objective:  Lying in bed, comfortable, +intubated  /52  Pulse 77  Temp 100.4  F (38  C)  Resp 22  Ht 1.803 m (5' 11\")  Wt 103.7 kg (228 lb 9.9 oz)  SpO2 98%  BMI 31.89 kg/m2  CT - serous output, +leak  CV - RRR, telemetry SR 70s, +dorsalis pedis pulses 1+ bilaterally  Pulm - coarse throughout  Abd - BS+, soft  Derm - sternal incision D/I   L LE incisions D/I  Lab - WBC 10.7   Hgb/Hct 8.9/26.3   Plt 87   Na 147   K 4.0   Cr/BUN 1.27/31   Mg 2.5   Glucose 126-131  +IABP 1:3  +kong 150 ml/2 hr  Epinephrine 0.035 mcg/kg/min  Phenylephrine 0.6 mcg/kg/min  Propofol 50 mcg/kg/min  FiO2 50%  PEEP 5  CXR - intubated, atelectasis    A/P:  1. S/p CABG x 3 POD #1  2. IABP - stable at 1:3, will give plt and then remove IABP  3. resp status - wean to extubate as able when IABP removed  4. Hypotension - wean gtt as able  5. CAP - rocephin/azithromycin  6. Hx of tobacco abuse - nicotine patch  7. Steroids - prednisone 40 mg daily  8 hyperlipidemia - crestor 40 mg  Plans to remove IABP this am after plt transfusion. Continue to monitor.    Cheko Andrade PA-C  [phone]  "

## 2018-11-07 NOTE — PROGRESS NOTES
500 ml albumin bolus given for elevated lactic acid. Will recheck LA 1 hr post infusion completion

## 2018-11-07 NOTE — PLAN OF CARE
"Problem: Restraint for Non-Violent/Non-Self-Destructive Behavior  Goal: Prevent/Manage Potential Problems  Maintain safety of patient and others during period of restraint.  Promote psychological and physical wellbeing.  Prevent injury to skin and involved body parts.  Promote nutrition, hydration, and elimination.  Outcome: No Change  Pt sedate but awoke, follows but severely agitated and mouthing/tongueing ETT, feels like he is \"choking\";  Pt medicated with PRN pain medication but required frequent reminders that an balloon pump is currently assisting his heart and that he is recovering from having open heart surgery.  Unable to properly wean patient d/t IABP in place and post extubation potential disruption to groin stability/clotting.  This was discussed with patient while bilateral soft wrist restraints were placed for pt safety.        "

## 2018-11-07 NOTE — PROGRESS NOTES
[facility] ICU RESPIRATORY NOTE  Date of Admission: 11/04/2018  Date of Intubation (most recent): 11/04/2018  Reason for Mechanical Ventilation: Airway protection s/p CABG   Number of Days on Mechanical Ventilation: 2  Met Criteria for Pressure Support Trial: No   Reason for No Pressure Support Trial: Per MD     Significant Events Today: None     Ventilation Mode: CMV/AC  (Continuous Mandatory Ventilation/ Assist Control)  FiO2 (%): 50 %  Rate Set (breaths/minute): 22 breaths/min  Tidal Volume Set (mL): 550 mL  PEEP (cm H2O): 5 cmH2O  Oxygen Concentration (%): 50 %  Resp: 22    ABG Results:    Recent Labs  Lab 11/06/18  1630 11/06/18  1409 11/06/18  1304 11/06/18  1131   PH 7.30* 7.21* 7.18* 7.18*   PCO2 38 46* 53* 56*   PO2 131* 93 101 307*   HCO3 18* 18* 20* 21     ETT appearance on chest x-ray: ETT in good position     Plan:  Will continue to monitor.    Gino BARNETT  11/7/2018

## 2018-11-07 NOTE — PLAN OF CARE
Problem: Patient Care Overview  Goal: Plan of Care/Patient Progress Review  Outcome: No Change  IABP 1:1 potential discontinue today.  Tolerating vent t/o shift.  intermitent periods of wakefulness/agitation.  Pt able to communicate that he feels like he is choking on ETT.  PRN pain medicaitions along with Propofol.  Excellent UOP.   Goal IABP to keep augmented  or>.  Epi at 0.035mcg/kg/min along with Phenylephrine at 0.6mcg/kg/min.  Lactic acid down to 2.5 from 4.4.  Hgb 8.9 Plt's=87 both Potassium and phosphorus replaced this a.m.  Attempted call to spouse in evening for update, unable to reach at that time.  Both [facility] and ICU MD updated overnight.

## 2018-11-07 NOTE — PROGRESS NOTES
[facility]    Infectious Disease Progress Note    Date of Service (when I saw the patient): 11/07/2018     Assessment & Plan   David Richard Schoenecker is a 68 year old male who was admitted on 11/4/2018.     Impression:  1. 68 y.o male with ACS.   2. Smoking. ? COPD.   3. Possible pneumonia.   4. For now on CAP treatment.   5. Imaging raising possibility of fungal process vs malignancy.      Recommendations:   Will follow up on  The cultures and serology.   Continue  With CAP treatment for now.   Noted post operative fevers, blood and urine cultures ordered, will follow...    Peter Hickman MD    Interval History   S/P CABG   In the ICU feeling better   T max of 101.7 today.      Physical Exam   Temp: 101.7  F (38.7  C) Temp src: Bladder     Heart Rate: 102 Resp: 9 SpO2: 92 % O2 Device: Aerosol mask Oxygen Delivery: 10 LPM  Vitals:    11/05/18 1800 11/06/18 0400 11/07/18 0615   Weight: 101.6 kg (223 lb 15.8 oz) 102.3 kg (225 lb 8.5 oz) 103.7 kg (228 lb 9.9 oz)     Vital Signs with Ranges  Temp:  [95.5  F (35.3  C)-101.7  F (38.7  C)] 101.7  F (38.7  C)  Heart Rate:  [] 102  Resp:  [9-29] 9  MAP:  [57 mmHg-215 mmHg] 86 mmHg  Arterial Line BP: ()/(37-97) 141/51  FiO2 (%):  [50 %] 50 %  SpO2:  [92 %-100 %] 92 %    Constitutional: Awake, alert, cooperative, no apparent distress  Lungs: Clear to auscultation bilaterally, no crackles or wheezing  Cardiovascular: Regular rate and rhythm, normal S1 and S2, and no murmur noted  Abdomen: Normal bowel sounds, soft, non-distended, non-tender  Skin: No rashes, no cyanosis, no edema  Other:    Medications     IV fluid REPLACEMENT ONLY       dextrose 5% and 0.45% NaCl + KCl 20 mEq/L 30 mL/hr (11/07/18 2225)     EPINEPHrine IV infusion ADULT Stopped (11/07/18 1208)     insulin (regular) 1.5 Units/hr (11/07/18 1150)     nitroGLYcerin Stopped (11/06/18 9576)     phenylephrine IV infusion ADULT Stopped (11/07/18 1141)     propofol (DIPRIVAN) infusion  Stopped (11/07/18 1100)       acetaminophen  975 mg Oral Q8H     albuterol  6 puff Inhalation Q4H     aspirin  325 mg Oral Daily     azithromycin  250 mg Intravenous Q24H     cefTRIAXone  1 g Intravenous Q24H     chlorhexidine  15 mL Mouth/Throat Q12H     gabapentin  100 mg Oral TID     insulin aspart   Subcutaneous TID w/meals     ipratropium - albuterol 0.5 mg/2.5 mg/3 mL  3 mL Nebulization 4x daily     lidocaine  2 patch Transdermal Q24H     lidocaine   Transdermal Q8H     lidocaine   Transdermal Q24h     metoprolol  5 mg Intravenous Q6H     nicotine  1 patch Transdermal Daily     nicotine   Transdermal Q8H     nicotine   Transdermal Daily     pantoprazole (PROTONIX) IV  40 mg Intravenous Daily     predniSONE  40 mg Oral Daily     rosuvastatin  40 mg Oral Daily     senna-docusate  1 tablet Oral BID    Or     senna-docusate  2 tablet Oral BID     sodium chloride (PF)  3 mL Intracatheter Q8H       Data   All microbiology laboratory data reviewed.  Recent Labs   Lab Test  11/07/18   0404  11/06/18   1304  11/06/18   1204  11/06/18   1130   WBC  10.7  27.8*   --   6.8   HGB  8.9*  10.9*  9.5*  8.8*  9.9*   HCT  26.3*  33.3*   --   30.0*   MCV  104*  105*   --   105*   PLT  87*  150   --   73*     Recent Labs   Lab Test  11/07/18   0404  11/06/18   1304  11/06/18   0300   CR  1.27*  1.36*  0.94     No lab results found.  Recent Labs   Lab Test  11/04/18   0955  11/04/18   0720  11/04/18   0338   CULT  Moderate growth  Normal man    No growth after 3 days  No growth after 3 days

## 2018-11-07 NOTE — PROGRESS NOTES
Pt extubated to 10 LPM, 40% aerosol. BBS coarse bilateral. No sign of stridor. BiPAP by bedside..11/7/2018  .Monica Graham

## 2018-11-07 NOTE — PROVIDER NOTIFICATION
returned call for [facility].  udpated regarding events of day along with current Lactic acid of 2.5 and current hemodynamics.  Plan to continue to monitor closely overnight with focus of removal of IABP likely tomorrow.

## 2018-11-07 NOTE — PROVIDER NOTIFICATION
"Update at bedside with Dr. Godinez.  Discussed case from arrival to ICU.  Specifically, elevated Lactic acid and acidotic ABG results along with current plan for IABP.  A Lactic acid level is pending as of this note, plan to update both [facility] and Dr. Godinez with results.  Call placed to pt's spouse Eun but was unable to reach her, message left to call when able.  Pt sedate with propofol but does attempt to open eyes to voice/touch and moves all extremities strongly.  No Vent wean per Dr. Godinez \"until after Balloon pump removed and pt fully recovered.\"     "

## 2018-11-07 NOTE — PROGRESS NOTES
SURGICAL ICU H&P  November 6, 2018      CO-MORBIDITIES:   S/P CABG (coronary artery bypass graft)  (primary encounter diagnosis)  NSTEMI (non-ST elevated myocardial infarction) (H)  Pneumonia due to infectious organism, unspecified laterality, unspecified part of lung  Tobacco use (1ppd)    ASSESSMENT: David Richard Schoenecker is a 68 year old male POD # 0 s/p CABG- admitted to the hospital on 11/4 with dyspnea at rest and subsequent chest pain- diagnosed with NSTEMI.  A Chest CT was obtained to evaluate for aortic dissection- this revealed nodular opacities concerning for infection vs malignant process.  He was then taken to the Cath lab on 11/5- severe 3 vessel disease was seen-no stenting was performed.  An IABP was placed and he was brought to the ICU.  On 11/6 he then underwent CABG x3-on pump.  Procedure was relatively uncomplicated.  Was transfused platelets due to thrombocytopenia. Day of surgery had issues with lactic acidosis- improved with increased minute ventilation and fluid boluses.  IABP remains in place    PLAN:  Neuro/ pain/ sedation:  - Monitor neurological status. Notify the MD for any acute changes in exam.  - fentanyl PRN, scheduled tylenol for pain.  - propofol for sedation while intubated    Pulmonary care:   Remains intubated with minimal vent requirements. Preop CT scan with nodular lesions in both lungs- pulm consulted and started on steroids and ABX  - Mechanically vented.  CMV with TV of 520 and RR 20, PEEP 5.  Wean O2 sats as able  - Will plan for spont breathing trial this AM- work to extubate  - On steroids per pulm consult, being treated with steroids and ceftriaxone for possible infectious pulm process  - Continue chest tubes    Cardiovascular:    - Now status post urgent 3 vessel Cabg on 11/6.  Had been on IABP preop- this continues post op.  MAP of 80 while on epi and phenyl infusions.  HR in 80s without pacing.  -On ASA/statin  -Metoprolol once off vasopressors  -Wean  vasopressors as able.  -On IABP currently with adequate MAPs-will discuss with surgical team about when to commence weaning off of the pump      GI care:   - NPO immediately post op  - On PPI  - Swallow eval after extubation    Fluids/ Electrolytes/ Nutrition:   - Low rate IV fluid plus multiple drips  - Electrolyte replacement protocol  - No indication for parenteral nutrition.    Renal/ Fluid Balance:    - Urine output brisk overnight  - Will continue to monitor intake and output.  - Plan to diuresis  - Continue kong for strict I's and O's    Endocrine:    - Insulin drip    ID/ Antibiotics:  - Being treated with ceftriaxone and azithromycin (concern for pulmonary infectious process)  - monitor WBC/fever curve    Heme:     - No active bleeding  - Trend H/H    MSK:  -PT/OT    Prophylaxis:    - Mechanical prophylaxis for DVT.   - No chemical DVT prophylaxis due to high risk of bleeding today  - H2 blocker ordered    Lines/ tubes/ drains:  - PIVs  - Kong for strict I's and O's  - Right internal jugular central line  - Arterial line  - ET tube  - Mediastinal drains  - IABP in place     Disposition:  - ICU    - - - - - - - - - - - - - - - - - - - - - - - - - - - - - - - - - - - - - - - - - - - - - - - - - - - - - - - - -     HISTORY PRESENTING ILLNESS: David Richard Schoenecker is a 68 year old male POD # 0 s/p CABG- admitted to the hospital on 11/4 with dyspnea at rest and subsequent chest pain- diagnosed with NSTEMI.  A Chest CT was obtained to evaluate for aortic dissection- this revealed nodular opacities concerning for infection vs malignant process.  He was then taken to the Cath lab on 11/5- severe 3 vessel disease was seen-no stenting was performed.  An IABP was placed and he was brought to the ICU.  On 11/6 he then underwent CABG x3-on pump.  Procedure was relatively uncomplicated.      REVIEW OF SYSTEMS: Unable to obtain- intubated    PAST MEDICAL HISTORY:   Past Medical History:   Diagnosis Date     Heart  disease      High cholesterol      Hypertension        SURGICAL HISTORY:   Past Surgical History:   Procedure Laterality Date     APPENDECTOMY       BYPASS GRAFT ARTERY CORONARY N/A 11/6/2018    Procedure: CORONARY ARTERY BYPASS GRAFTING x 3 (LIMA - LAD; SV - OM; SV - PDA) WITH ENDOSCOPIC VEIN HARVEST ON THE LEFT LOWER EXTREMITY    (ON PUMP OXYGENATOR ; BAL BY KEYA)   ;  Surgeon: Sander Wan MD;  Location:  OR     ENT SURGERY      tonsillectomy       SOCIAL HISTORY: unable to obtain-intubated     FAMILY HISTORY:  unable to obtain-intubated     ALLERGIES:    No Known Allergies    MEDICATIONS:    No current facility-administered medications on file prior to encounter.   No current outpatient prescriptions on file prior to encounter.    PHYSICAL EXAMINATION:  Temp:  [98.4  F (36.9  C)-101.7  F (38.7  C)] 101.7  F (38.7  C)  Heart Rate:  [] 102  Resp:  [9-29] 9  BP: (96)/(54) 96/54  MAP:  [57 mmHg-215 mmHg] 69 mmHg  Arterial Line BP: ()/(37-97) 115/48  FiO2 (%):  [50 %] 50 %  SpO2:  [92 %-100 %] 95 %    General: intubated and sedated.  Neuro: No focal neuro deficits noted.   Respiratory:On vent without spontaneous respirations.  with RR 18, PEEP 5. Chest drains to suction, air leak seen.  Cardiovascular: Regular rate and rhythm- in 80s with MAP in 80s.  IABP in place  Gastrointestinal: Abdomen soft, non-distended  Chest: Incision covered with dry dressings.  Serosang fluid in 2 mediastinal drains-on suction  Extremities: No limb deformities. No pedal edema.  ACE wrap along left leg  Skin: As noted above. No rashes or lesions appreciated.    LABS: Reviewed.   Arterial Blood Gases     Recent Labs  Lab 11/07/18  1000 11/06/18  1630 11/06/18  1409 11/06/18  1304   PH 7.33* 7.30* 7.21* 7.18*   PCO2 42 38 46* 53*   PO2 252* 131* 93 101   HCO3 22 18* 18* 20*     Complete Blood Count     Recent Labs  Lab 11/07/18  0404 11/06/18  1304 11/06/18  1204 11/06/18  1130  11/06/18  0300   WBC 10.7 27.8*   --  6.8  --  11.6*   HGB 8.9* 10.9* 9.5* 8.8*  9.9*  < > 11.9*   PLT 87* 150  --  73*  --  98*   < > = values in this interval not displayed.  Basic Metabolic Panel    Recent Labs  Lab 11/07/18  1445 11/07/18  0404 11/06/18  1304 11/06/18  1204 11/06/18  1130  11/06/18  0300  11/04/18  0338   NA  --  147* 143 143 144  142  < > 142  --  140   POTASSIUM 4.4 4.0 4.8 4.9 4.6  4.6  < > 4.2  < > 4.0   CHLORIDE  --  118* 113*  --  113*  --  113*  --  107   CO2  --  22 20  --  20  --  22  --  25   BUN  --  31* 35*  --   --   --  31*  --  19   CR  --  1.27* 1.36*  --   --   --  0.94  --  1.03   GLC  --  127* 177*  --   --   --  105*  --  132*   < > = values in this interval not displayed.  Liver Function Tests    Recent Labs  Lab 11/06/18  1304 11/06/18  1130 11/04/18  0338   AST  --   --  34   ALT  --   --  19   ALKPHOS  --   --  87   BILITOTAL  --   --  1.1   ALBUMIN  --   --  3.9   INR 1.37* 1.78*  --      Pancreatic Enzymes  No lab results found in last 7 days.  Coagulation Profile    Recent Labs  Lab 11/06/18  1304 11/06/18  1130   INR 1.37* 1.78*   PTT 39* 49*     Lactate  Invalid input(s): LACTATE    IMAGING:  Results for orders placed or performed during the hospital encounter of 11/04/18   Chest XR,  PA & LAT    Narrative    CHEST TWO VIEWS 11/4/2018 4:23 AM     HISTORY: Cough and shortness of breath.     COMPARISON: None.    FINDINGS: Heart size and pulmonary vascularity are within normal  limits. The lungs are clear. No pneumothorax or pleural effusion.       Impression    IMPRESSION: No radiographic evidence of acute chest abnormality.     MONICA BAJWA MD   CT Aortic Survey w Contrast    Narrative    CT AORTIC SURVEY WITH CONTRAST   11/4/2018 6:14 AM     HISTORY: Shortness of breath, elevated troponin, hypotension; evaluate  for aortic dissection.     TECHNIQUE:  80 mL Isovue-370. Noncontrast CT images of the chest were  followed by arterial phase images of the chest, abdomen, and pelvis.  Radiation dose  for this scan was reduced using automated exposure  control, adjustment of the mA and/or kV according to patient size, or  iterative reconstruction technique.    COMPARISON: None.    FINDINGS: There is no aortic dissection. The thoracic aorta is normal  in caliber. There is conventional three-vessel anatomy of the arch.  Mild thoracic aortic atherosclerosis. There is moderate coronary  atherosclerosis. There is ectasia of the infrarenal abdominal aorta  that measures a maximum of 2.8 cm. There is mild abdominal aortic  atherosclerosis without significant stenosis. There is less than 50%  stenosis in bilateral common iliac arteries. Bilateral external iliac  arteries and common femoral arteries are patent.    CHEST: No pleural or pericardial effusion. No pneumothorax. There are  enlarged left hilar and mediastinal lymph nodes. A left hilar lymph  node measures 2.8 x 1.6 cm (series 4, image 50). A subcarinal lymph  node measures 1.9 x 1.2 cm. No right hilar or axillary adenopathy.  There are nodular airspace opacities throughout both lungs. The  nodules are surrounded by groundglass opacity. Largest nodule is a  left lower lobe nodule that measures up to 1.1 cm (series 6, image  50).    Abdomen and pelvis: The liver, spleen, pancreas, and adrenal glands  are unremarkable. Small gallstone noted in the gallbladder. There are  nonobstructing calculi in both kidneys, measuring up to 6 mm in the  right kidney and 10 mm in the left kidney. Small bilateral renal cysts  are noted. No evidence of solid renal mass. Patient has a small hiatal  hernia. No bowel obstruction, free air, or ascites. No abdominal or  retroperitoneal lymphadenopathy. There is a fat-containing umbilical  hernia. No pelvic adenopathy, free fluid, or mass.    No suspicious bone abnormalities.      Impression    IMPRESSION:  1. No evidence of aortic dissection.  2. Nodular bilateral airspace opacities surrounded by groundglass  opacity. There is associated  mediastinal and left hilar adenopathy.  Radiographic findings suggestive of a fungal infection, although  multifocal bacterial pneumonia or metastatic disease can have a  similar appearance.  3. Nonobstructing bilateral nephrolithiasis.  4. Small hiatal hernia.    MONICA BAJWA MD   US Carotid Bilateral    Narrative    BILATERAL CAROTID ULTRASOUND November 5, 2018 6:39 PM     HISTORY: Preoperative coronary artery bypass graft.      COMPARISON: None.    RIGHT CAROTID FINDINGS: Moderate calcified and noncalcified plaque  with shadowing. Abnormal waveforms due to balloon pump.  Right ICA PSV:  279  cm/sec.  Right ICA EDV:  0 cm/sec.  Right ICA/CCA PSV Ratio:  2.2.    These indicate greater than 70% diameter stenosis of the right ICA.    Right Vertebral: Antegrade flow.   Right ECA: Antegrade flow.     LEFT CAROTID FINDINGS: Mild noncalcified plaque.  Left ICA PSV:  151  cm/sec.  Left ICA EDV:  0 cm/sec.  Left ICA/CCA PSV Ratio:  1.0.    These indicate  50 - 69%  diameter stenosis of the left ICA.    Left Vertebral: Antegrade flow.   Left ECA: Antegrade flow.     Causes of Decreased Accuracy: None.       Impression    IMPRESSION:    1. Greater than 70% diameter stenosis of the right internal carotid  artery relative to the distal internal carotid artery diameter.  2. 50-69% diameter stenosis of the left internal carotid artery  relative to the distal internal carotid artery diameter.   3. Abnormal waveforms due to balloon pump.    DEVI VARELA MD   US Lower Extremity Venous Mapping Left    Narrative    ULTRASOUND LEFT LOWER EXTREMITY VENOUS MAPPING November 6, 2018 6:40  PM     HISTORY: Preoperative coronary artery bypass graft.     COMPARISON: None.    FINDINGS:  Left great saphenous vein in the calf ranges between 2.5 mm and 3.5  mm.  Left great saphenous vein in the thigh ranges between 2.9 mm and 6.8  mm.      Impression    IMPRESSION: Left great saphenous vein mapping with measurements as  above.    PHANI  GUADALUPE, DO

## 2018-11-07 NOTE — PLAN OF CARE
Problem: Patient Care Overview  Goal: Plan of Care/Patient Progress Review  CR: order received. Per RN, pt not appropriate for CR eval this date. Will re-schedule.

## 2018-11-07 NOTE — PROGRESS NOTES
"Cardiology Progress Note  [facility]         Assessment and Plan:      Postop day 2 after coronary artery bypass surgery, LIMA to the LAD vein graft to the OM and PDA.  Still intubated.  IABP now 2 to 1.  Blood pressure is stable on low-dose nor epi and vasopressin per  Plan is to discontinue intra-aortic balloon pump  Taper inotropes as able.  He is on pressure support and may wean and if tolerates it well.  No arrhythmias    Ischemic cardiomyopathy  Continue manage fluid status.  May need as needed diuresis    Right lung nodule as well as mediastinal hilar lymphadenopathy  Followed by pulmonary.  Given antibiotics empirically.  Likely also COPD exacerbation is on prednisone, taper                       Interval History:   Intubated and sedated          Review of Systems:   Review of systems is not applicable to this patient          Physical Exam:        Blood pressure 96/54, pulse 77, temperature 101.7  F (38.7  C), resp. rate 9, height 1.803 m (5' 11\"), weight 103.7 kg (228 lb 9.9 oz), SpO2 95 %.  Vitals:    11/05/18 1800 11/06/18 0400 11/07/18 0615   Weight: 101.6 kg (223 lb 15.8 oz) 102.3 kg (225 lb 8.5 oz) 103.7 kg (228 lb 9.9 oz)     Weights since admission  Vitals:    11/04/18 0444 11/04/18 0822 11/05/18 0604 11/05/18 1800   Weight: 97.1 kg (214 lb) 100.1 kg (220 lb 10.9 oz) 99.3 kg (219 lb) 101.6 kg (223 lb 15.8 oz)    11/06/18 0400 11/07/18 0615   Weight: 102.3 kg (225 lb 8.5 oz) 103.7 kg (228 lb 9.9 oz)     Vital Signs with Ranges  Temp:  [98.4  F (36.9  C)-101.7  F (38.7  C)] 101.7  F (38.7  C)  Heart Rate:  [] 102  Resp:  [9-29] 9  BP: (96)/(54) 96/54  MAP:  [57 mmHg-215 mmHg] 65 mmHg  Arterial Line BP: ()/(37-97) 116/45  FiO2 (%):  [50 %] 50 %  SpO2:  [92 %-100 %] 95 %  I/O's Last 24 hours  I/O last 3 completed shifts:  In: 5210.78 [I.V.:3190.78; Other:420]  Out: 3165 [Urine:1570; Blood:1125; Chest Tube:470]  Net I/O since admission  11/02 0700 - 11/07 0659  In: 9055.58 [P.O.:480; " I.V.:5355.58]  Out: 5045 [Urine:3450]  Net: 4010.58    EXAM:    Constitutional:   intubated     Head:   Normocephalic, atramatic     Lungs:   rhonchii bilateral     Cardiovascular:   normal S1 and S2 and no murmur noted     Extremities and Back:   Edema 1+     Neurological:   sedated            Medications:          acetaminophen  650 mg Rectal Once     acetaminophen  975 mg Oral Q8H     albuterol  6 puff Inhalation Q4H     aspirin  325 mg Oral Daily     azithromycin  250 mg Intravenous Q24H     cefTRIAXone  1 g Intravenous Q24H     chlorhexidine  15 mL Mouth/Throat Q12H     gabapentin  100 mg Oral TID     insulin aspart   Subcutaneous TID w/meals     ipratropium - albuterol 0.5 mg/2.5 mg/3 mL  3 mL Nebulization 4x daily     lidocaine  2 patch Transdermal Q24H     lidocaine   Transdermal Q8H     lidocaine   Transdermal Q24h     metoprolol  5 mg Intravenous Q6H     nicotine  1 patch Transdermal Daily     nicotine   Transdermal Q8H     nicotine   Transdermal Daily     pantoprazole (PROTONIX) IV  40 mg Intravenous Daily     predniSONE  40 mg Oral Daily     rosuvastatin  40 mg Oral Daily     senna-docusate  1 tablet Oral BID    Or     senna-docusate  2 tablet Oral BID     sodium chloride (PF)  3 mL Intracatheter Q8H            Data:      All new lab and imaging data was reviewed.   Recent Labs   Lab Test  11/07/18   0404  11/06/18   1304  11/06/18   1204  11/06/18   1130   WBC  10.7  27.8*   --   6.8   HGB  8.9*  10.9*  9.5*  8.8*  9.9*   MCV  104*  105*   --   105*   PLT  87*  150   --   73*   INR   --   1.37*   --   1.78*      Recent Labs   Lab Test  11/07/18   0404  11/06/18   1304  11/06/18   1204  11/06/18   1130   11/06/18   0300   NA  147*  143  143  144  142   < >  142   POTASSIUM  4.0  4.8  4.9  4.6  4.6   < >  4.2   CHLORIDE  118*  113*   --   113*   --   113*   CO2  22  20   --   20   --   22   BUN  31*  35*   --    --    --   31*   CR  1.27*  1.36*   --    --    --   0.94   ANIONGAP  7  10   --   11   --    7   CONOR  7.7*  7.7*   --    --    --   8.1*   GLC  127*  177*   --    --    --   105*    < > = values in this interval not displayed.     Recent Labs   Lab Test  11/04/18   1128  11/04/18   0848  11/04/18   0338   TROPI  17.825*  13.120*  2.420*              Imaging:   Recent Results (from the past 24 hour(s))   XR Chest Port 1 View    Narrative    CHEST PORTABLE ONE VIEW November 7, 2018 6:51 AM     HISTORY: Status post coronary artery bypass graft.    COMPARISON: 11/6/2018.      Impression    IMPRESSION: Lungs are slightly hypoinflated with perihilar and basilar  opacities favored to represent atelectasis. Postoperative changes of  coronary artery bypass grafting noted. Heart size is unchanged. Tubes  and lines are unchanged.    RAFAEL GONSALEZ MD

## 2018-11-07 NOTE — PROVIDER NOTIFICATION
Right wrist restraint and Left wrist restraint were initiated on patient on 11/7/2018 at 5:35 AM as patient was pulling at tubes and attempting to disrupt treatments.      Order received: Yes, from Dr. Godinez        Criteria explained to Patient     Restraint care Plan initiated: Yes    Moisés Arreguin

## 2018-11-08 ENCOUNTER — APPOINTMENT (OUTPATIENT)
Dept: OCCUPATIONAL THERAPY | Facility: CLINIC | Age: 68
DRG: 233 | End: 2018-11-08
Attending: PHYSICIAN ASSISTANT
Payer: COMMERCIAL

## 2018-11-08 LAB
ANION GAP SERPL CALCULATED.3IONS-SCNC: 5 MMOL/L (ref 3–14)
BUN SERPL-MCNC: 30 MG/DL (ref 7–30)
CALCIUM SERPL-MCNC: 7.7 MG/DL (ref 8.5–10.1)
CHLORIDE SERPL-SCNC: 117 MMOL/L (ref 94–109)
CO2 SERPL-SCNC: 24 MMOL/L (ref 20–32)
CREAT SERPL-MCNC: 0.95 MG/DL (ref 0.66–1.25)
ERYTHROCYTE [DISTWIDTH] IN BLOOD BY AUTOMATED COUNT: 13.1 % (ref 10–15)
GFR SERPL CREATININE-BSD FRML MDRD: 78 ML/MIN/1.7M2
GLUCOSE BLDC GLUCOMTR-MCNC: 122 MG/DL (ref 70–99)
GLUCOSE BLDC GLUCOMTR-MCNC: 134 MG/DL (ref 70–99)
GLUCOSE BLDC GLUCOMTR-MCNC: 136 MG/DL (ref 70–99)
GLUCOSE BLDC GLUCOMTR-MCNC: 145 MG/DL (ref 70–99)
GLUCOSE BLDC GLUCOMTR-MCNC: 152 MG/DL (ref 70–99)
GLUCOSE SERPL-MCNC: 158 MG/DL (ref 70–99)
HCT VFR BLD AUTO: 24.5 % (ref 40–53)
HGB BLD-MCNC: 8.2 G/DL (ref 13.3–17.7)
INTERPRETATION ECG - MUSE: NORMAL
INTERPRETATION ECG - MUSE: NORMAL
LAB SCANNED RESULT: NORMAL
MAGNESIUM SERPL-MCNC: 2.6 MG/DL (ref 1.6–2.3)
MCH RBC QN AUTO: 34.9 PG (ref 26.5–33)
MCHC RBC AUTO-ENTMCNC: 33.5 G/DL (ref 31.5–36.5)
MCV RBC AUTO: 104 FL (ref 78–100)
PHOSPHATE SERPL-MCNC: 3 MG/DL (ref 2.5–4.5)
PLATELET # BLD AUTO: 75 10E9/L (ref 150–450)
POTASSIUM SERPL-SCNC: 4.8 MMOL/L (ref 3.4–5.3)
RBC # BLD AUTO: 2.35 10E12/L (ref 4.4–5.9)
SODIUM SERPL-SCNC: 146 MMOL/L (ref 133–144)
WBC # BLD AUTO: 9.6 10E9/L (ref 4–11)

## 2018-11-08 PROCEDURE — C9113 INJ PANTOPRAZOLE SODIUM, VIA: HCPCS | Performed by: PHYSICIAN ASSISTANT

## 2018-11-08 PROCEDURE — 25000125 ZZHC RX 250: Performed by: PHYSICIAN ASSISTANT

## 2018-11-08 PROCEDURE — 40000133 ZZH STATISTIC OT WARD VISIT

## 2018-11-08 PROCEDURE — 25000128 H RX IP 250 OP 636: Performed by: INTERNAL MEDICINE

## 2018-11-08 PROCEDURE — 84100 ASSAY OF PHOSPHORUS: CPT | Performed by: SURGERY

## 2018-11-08 PROCEDURE — 00000146 ZZHCL STATISTIC GLUCOSE BY METER IP

## 2018-11-08 PROCEDURE — 12000007 ZZH R&B INTERMEDIATE

## 2018-11-08 PROCEDURE — 97165 OT EVAL LOW COMPLEX 30 MIN: CPT | Mod: GO

## 2018-11-08 PROCEDURE — 25800025 ZZH RX 258: Performed by: PHYSICIAN ASSISTANT

## 2018-11-08 PROCEDURE — 83735 ASSAY OF MAGNESIUM: CPT | Performed by: SURGERY

## 2018-11-08 PROCEDURE — 94640 AIRWAY INHALATION TREATMENT: CPT | Mod: 76

## 2018-11-08 PROCEDURE — 94640 AIRWAY INHALATION TREATMENT: CPT

## 2018-11-08 PROCEDURE — 40000275 ZZH STATISTIC RCP TIME EA 10 MIN

## 2018-11-08 PROCEDURE — 99232 SBSQ HOSP IP/OBS MODERATE 35: CPT | Performed by: PHYSICIAN ASSISTANT

## 2018-11-08 PROCEDURE — 25000132 ZZH RX MED GY IP 250 OP 250 PS 637: Performed by: PHYSICIAN ASSISTANT

## 2018-11-08 PROCEDURE — 25000132 ZZH RX MED GY IP 250 OP 250 PS 637: Performed by: INTERNAL MEDICINE

## 2018-11-08 PROCEDURE — 99232 SBSQ HOSP IP/OBS MODERATE 35: CPT | Performed by: SURGERY

## 2018-11-08 PROCEDURE — 40000141 ZZH STATISTIC PERIPHERAL IV START W/O US GUIDANCE

## 2018-11-08 PROCEDURE — 25000128 H RX IP 250 OP 636: Performed by: PHYSICIAN ASSISTANT

## 2018-11-08 PROCEDURE — 97530 THERAPEUTIC ACTIVITIES: CPT | Mod: GO

## 2018-11-08 PROCEDURE — 99232 SBSQ HOSP IP/OBS MODERATE 35: CPT | Performed by: INTERNAL MEDICINE

## 2018-11-08 PROCEDURE — 25000125 ZZHC RX 250: Performed by: INTERNAL MEDICINE

## 2018-11-08 PROCEDURE — 97535 SELF CARE MNGMENT TRAINING: CPT | Mod: GO

## 2018-11-08 PROCEDURE — 94799 UNLISTED PULMONARY SVC/PX: CPT

## 2018-11-08 PROCEDURE — 85027 COMPLETE CBC AUTOMATED: CPT | Performed by: SURGERY

## 2018-11-08 PROCEDURE — 25000125 ZZHC RX 250: Performed by: SURGERY

## 2018-11-08 PROCEDURE — 80048 BASIC METABOLIC PNL TOTAL CA: CPT | Performed by: SURGERY

## 2018-11-08 PROCEDURE — 99207 ZZC CDG-MDM COMPONENT: MEETS LOW - DOWN CODED: CPT | Performed by: PHYSICIAN ASSISTANT

## 2018-11-08 RX ORDER — ATORVASTATIN CALCIUM 40 MG/1
40 TABLET, FILM COATED ORAL EVERY EVENING
Status: DISCONTINUED | OUTPATIENT
Start: 2018-11-08 | End: 2018-11-12 | Stop reason: HOSPADM

## 2018-11-08 RX ORDER — ACETAMINOPHEN 325 MG/1
325-650 TABLET ORAL EVERY 4 HOURS PRN
Status: DISCONTINUED | OUTPATIENT
Start: 2018-11-08 | End: 2018-11-08

## 2018-11-08 RX ORDER — LISINOPRIL 2.5 MG/1
2.5 TABLET ORAL DAILY
Status: DISCONTINUED | OUTPATIENT
Start: 2018-11-08 | End: 2018-11-12 | Stop reason: HOSPADM

## 2018-11-08 RX ORDER — LIDOCAINE 40 MG/G
CREAM TOPICAL
Status: DISCONTINUED | OUTPATIENT
Start: 2018-11-08 | End: 2018-11-12 | Stop reason: HOSPADM

## 2018-11-08 RX ORDER — PREDNISONE 5 MG/1
10 TABLET ORAL DAILY
Status: DISCONTINUED | OUTPATIENT
Start: 2018-11-20 | End: 2018-11-12 | Stop reason: HOSPADM

## 2018-11-08 RX ORDER — METOPROLOL TARTRATE 25 MG/1
25 TABLET, FILM COATED ORAL 2 TIMES DAILY
Status: DISCONTINUED | OUTPATIENT
Start: 2018-11-08 | End: 2018-11-10

## 2018-11-08 RX ORDER — PREDNISONE 20 MG/1
40 TABLET ORAL DAILY
Status: COMPLETED | OUTPATIENT
Start: 2018-11-09 | End: 2018-11-09

## 2018-11-08 RX ORDER — NALOXONE HYDROCHLORIDE 0.4 MG/ML
.1-.4 INJECTION, SOLUTION INTRAMUSCULAR; INTRAVENOUS; SUBCUTANEOUS
Status: DISCONTINUED | OUTPATIENT
Start: 2018-11-08 | End: 2018-11-12 | Stop reason: HOSPADM

## 2018-11-08 RX ORDER — LORATADINE 10 MG/1
10 TABLET ORAL DAILY
Status: DISCONTINUED | OUTPATIENT
Start: 2018-11-08 | End: 2018-11-12 | Stop reason: HOSPADM

## 2018-11-08 RX ORDER — ASPIRIN 81 MG/1
81 TABLET ORAL DAILY
Status: DISCONTINUED | OUTPATIENT
Start: 2018-11-09 | End: 2018-11-08 | Stop reason: CLARIF

## 2018-11-08 RX ORDER — PREDNISONE 20 MG/1
20 TABLET ORAL DAILY
Status: DISCONTINUED | OUTPATIENT
Start: 2018-11-15 | End: 2018-11-12 | Stop reason: HOSPADM

## 2018-11-08 RX ADMIN — CEFTRIAXONE 1 G: 1 INJECTION, POWDER, FOR SOLUTION INTRAMUSCULAR; INTRAVENOUS at 08:44

## 2018-11-08 RX ADMIN — GABAPENTIN 100 MG: 100 CAPSULE ORAL at 10:22

## 2018-11-08 RX ADMIN — AZITHROMYCIN MONOHYDRATE 250 MG: 500 INJECTION, POWDER, LYOPHILIZED, FOR SOLUTION INTRAVENOUS at 13:54

## 2018-11-08 RX ADMIN — ACETAMINOPHEN 975 MG: 325 TABLET, FILM COATED ORAL at 05:33

## 2018-11-08 RX ADMIN — ALBUTEROL SULFATE 6 PUFF: 90 AEROSOL, METERED RESPIRATORY (INHALATION) at 05:35

## 2018-11-08 RX ADMIN — IPRATROPIUM BROMIDE AND ALBUTEROL SULFATE 3 ML: 2.5; .5 SOLUTION RESPIRATORY (INHALATION) at 07:20

## 2018-11-08 RX ADMIN — ACETAMINOPHEN 975 MG: 325 TABLET, FILM COATED ORAL at 21:18

## 2018-11-08 RX ADMIN — LORATADINE 10 MG: 10 TABLET ORAL at 12:32

## 2018-11-08 RX ADMIN — IPRATROPIUM BROMIDE AND ALBUTEROL SULFATE 3 ML: 2.5; .5 SOLUTION RESPIRATORY (INHALATION) at 18:19

## 2018-11-08 RX ADMIN — SENNOSIDES AND DOCUSATE SODIUM 1 TABLET: 8.6; 5 TABLET ORAL at 10:22

## 2018-11-08 RX ADMIN — IPRATROPIUM BROMIDE AND ALBUTEROL SULFATE 3 ML: 2.5; .5 SOLUTION RESPIRATORY (INHALATION) at 11:16

## 2018-11-08 RX ADMIN — IPRATROPIUM BROMIDE AND ALBUTEROL SULFATE 3 ML: 2.5; .5 SOLUTION RESPIRATORY (INHALATION) at 16:16

## 2018-11-08 RX ADMIN — SENNOSIDES AND DOCUSATE SODIUM 1 TABLET: 8.6; 5 TABLET ORAL at 21:18

## 2018-11-08 RX ADMIN — PREDNISONE 40 MG: 20 TABLET ORAL at 17:16

## 2018-11-08 RX ADMIN — LISINOPRIL 2.5 MG: 2.5 TABLET ORAL at 13:50

## 2018-11-08 RX ADMIN — POTASSIUM CHLORIDE, DEXTROSE MONOHYDRATE AND SODIUM CHLORIDE: 150; 5; 450 INJECTION, SOLUTION INTRAVENOUS at 05:22

## 2018-11-08 RX ADMIN — METOPROLOL TARTRATE 25 MG: 25 TABLET ORAL at 21:18

## 2018-11-08 RX ADMIN — ASPIRIN 325 MG: 325 TABLET, DELAYED RELEASE ORAL at 13:50

## 2018-11-08 RX ADMIN — METOPROLOL TARTRATE 5 MG: 5 INJECTION, SOLUTION INTRAVENOUS at 02:11

## 2018-11-08 RX ADMIN — METOPROLOL TARTRATE 25 MG: 25 TABLET ORAL at 12:32

## 2018-11-08 RX ADMIN — GABAPENTIN 100 MG: 100 CAPSULE ORAL at 21:18

## 2018-11-08 RX ADMIN — ATORVASTATIN CALCIUM 40 MG: 40 TABLET, FILM COATED ORAL at 21:18

## 2018-11-08 RX ADMIN — PANTOPRAZOLE SODIUM 40 MG: 40 INJECTION, POWDER, FOR SOLUTION INTRAVENOUS at 08:47

## 2018-11-08 RX ADMIN — GABAPENTIN 100 MG: 100 CAPSULE ORAL at 17:16

## 2018-11-08 ASSESSMENT — ACTIVITIES OF DAILY LIVING (ADL)
ADLS_ACUITY_SCORE: 11
PREVIOUS_RESPONSIBILITIES: MEAL PREP;SHOPPING;YARDWORK;MEDICATION MANAGEMENT;FINANCES;DRIVING

## 2018-11-08 NOTE — PLAN OF CARE
Problem: Patient Care Overview  Goal: Plan of Care/Patient Progress Review  Outcome: Improving  Pt A/Ox4. Denies pain. VSS. Phenylephrine off by 0200. [place] and arterial line pulled, no longer indicated. Friction rub. CT 20-40cc serosanguinous drainage an hour. Tele SR with ST depression. V wires capped. Steinberg with pink to red output. Asymptomatic and hbg 8.2 however plt's 75. Up to chair with assist of two, tolerated for about 45 mins. Plan to continue to mobilize.

## 2018-11-08 NOTE — PROGRESS NOTES
ICU staff  DOS 11/8/2018    Tolerated wean from phenylephrine overnight. Alert this morning, coughing well, offered no complaints.    Vitals:   100.2  80s  12-19  90s-110s/60s-70s % 4LNC    I/O: 2775/2415  /120    Exam:  Gen: Alert, appears comfortable, no apparent distress  HEENT: NC/AT, anicteric  Pulm: Breathing comfortably; chest tubes serosanguinous  Cor: RRR  Abdomen/GI: Soft, NT/ND  : Steinberg with dark urine  Extremities: Warm, palpable distal pulses  Skin: Warm, perfused  Neuro: Alert, appropriate, grossly intact  Psych: Calm, mood/affect congruent    Data:   Labs reviewed  - Hb 8.2 (8.9), INR down at 1.37, WBCs normalized 9.6    Assessment/plan:  69 y/o man POD #2 s/p CABG x3. Improving, on pathway.  CNS: Alert/appropriate. Continues on APAP, hydromorphone, oxy for pain control.  Pulm: Tolerating O2 taper.  - Pulmonary insufficiency: Wean O2 as tolerated, continue pulmonary toilet.   - Chest tubes per CVTS.  - COPD: Duonebs.  CV: Off pressors overnight.  - Hypotension/vasoplegia: Resolved.  - CAD s/p CABG: Cares per CV surgery pathway. On aspirin, statin, metoprolol.  - Cardiology following.  FEN/GI: Abdomen benign, diet as tolerated.  : UOP 30-60/hr overall.  Heme: Hb stable at 8.2.  - Acute blood loss anemia: No indication for transfusion.   Msk: No new issues.  Endo: Glucose ; insulin PRN.  ID: Afebrile, WBCs normalized.  - ?Pneumonia: Remains on ceftriaxone, azithromycin, prednisone. ID following.  - Nothing new on cultures.  ICU: SCDs; start heparin when OK with CVTS.  - Transfer to floor.    This patient is not critically ill and is a candidate for transfer to the general care floor. Level 1 visit on 11/8/2018.     Jerome Quezada MD, PhD  Surgical critical care  November 8, 2018, 1:41 PM

## 2018-11-08 NOTE — PROVIDER NOTIFICATION
TAYLOR Still for CV surgery notified of patient having low urine output. Will encourage fluid intake.

## 2018-11-08 NOTE — PLAN OF CARE
"Problem: Patient Care Overview  Goal: Plan of Care/Patient Progress Review  Discharge Planner OT   Patient plan for discharge: family prefers patient discharges with spouse to their daughter's home in [place] and attend OPCR at [facility] until doing well enough to return home to [facility] where could then continue rehab at [facility].  ???  Spouse and patient also hope to go to their winter home in [place] after [place] as well.  Current status: OT/cardiac rehab eval completed and treatment initiated today, POD#2 CABG x 3,  in ICU just prior to transfer to floor, pt's spouse and daughters present who provided baseline info and challenges in current home setting (see OT eval for full report). Following cues pt performed supine to sit with Mod A of 1, sit-stand CGA, bed to wc approx 4 ft away with Min A of 1.  Pt admits to being heavy smoker smoking 3/4ppd x approx 50yrs (did quit once for 8 yrs). He is motivated to \"stop for good\". VSS during transfer on 2L. Cues to follow sternal precautions initially but follow through good. Very sleepy. Pt's spouse very anxious, had many concerns and questions and OT able to provide responses to her satisfaction and/or deferred her to discuss with cardiologist. Patient will be seen twice daily per POC.  Barriers to return to prior living situation: stairs (multiple levels and challenges in [facility] home), in daughter's home stairs   Recommendations for discharge: home w/family assist and OPCR at [place]  Rationale for recommendations: pt will benefit from OPCR for progressive monitored exercise and further education in CAD management to maximize recovery and general cardiac health       Entered by: Malachi Johnston 11/08/2018 4:32 PM         "

## 2018-11-08 NOTE — PLAN OF CARE
Problem: Patient Care Overview  Goal: Plan of Care/Patient Progress Review  Outcome: Improving  End of Shift Nursing Summary  11/07/18 0700 to 1930   Neuro:  Oriented x4. CAICEDO to command.  Pain: Mild heaviness this afternoon, no other complaints.   CV:  SR with occasional PVC. 1 tachycardic arrhythmia, self resolved.   Pulm: Lungs with expiratory wheezes throughout fields. Frequent weak cough. IS to 1250.   GI/: Urine output adequate. No BM. Passing gas. Tolerating ice chips and sips of water.   Endocrine: BG checks and insulin drip.  Skin: Incisions CDI.   Fever: Tmax 102.2-bladder. MD aware, cultures ordered.  Lines/drips: Steinberg. PA catheter. 3 PIV  Additional: IABP removed. Extubated at 1125. Dangled and stood at edge of bed-no dizziness but GRANADOS.      *See EPIC flowsheet for full nursing assessment findings    Elise Teresa

## 2018-11-08 NOTE — PLAN OF CARE
Problem: Patient Care Overview  Goal: Plan of Care/Patient Progress Review  Outcome: Improving  End of Shift Nursing Summary  11/08/18 0700 to 1930   Neuro:  Intact. CAICEDO  Pain: Denies  CV:  SR with PVC. BP stable. Denies dizziness.   Pulm: Lungs diminished and expiratory wheeze. Using IS independently-1250 ml volume best.   GI/: Urine pink/red with sediment. No BM. No appetite. Encouraging fluids.  Endocrine: BG checks and SSi as needed  Skin: Incisions. CDI.   Fever: aFebrile.   Lines/drips: Cordis removed without complication. 1 PIV. Steinberg and chest tubes present.  Additional: Up in chair this a.m. With assist 2. Transfer to station 33.     *See EPIC flowsheet for full nursing assessment findings    Elise Teresa

## 2018-11-08 NOTE — PROGRESS NOTES
"CT surgery Progress Note    Severe 3 vessel coronary artery disease s/p CABG x 3 (LIMA to LAD, SVG to OM, SVG to PDA) POD #2    Subjective:  States that pain is being controlled. Denies any hallucinations. Breathing is ok. Working with IS/flutter valve. Appetite is decreased. No breakfast this am. +flatus/-BM, denies any popping/clicking in his breast bone, did not sleep very well last night, blood in urine    Objective:  Lying in bed, comfortable, +O2  /71  Pulse 77  Temp 98.7  F (37.1  C) (Oral)  Resp 12  Ht 1.803 m (5' 11\")  Wt 102.6 kg (226 lb 3.1 oz)  SpO2 99%  BMI 31.55 kg/m2  CT - decreased serous output, +leak  CV - RRR, telemetry SR 80s with freq PVCs, +dorsalis pedis pulses 1+ bilaterally  Pulm - mildly coarse anteriorly, -1000 ml  Abd - BS+, soft  Derm - sternal incision D/I   L LE incisions D/I   R groin (IABP) soft, no hematoma  Lab - WBC 9.6   Hgb/Hct 8.2/24.5   Plt 75 (87)   Na 146   K 4.8   Cr/BUN 0.95/30   Mg 2.6   Glucose 111-158  +kong 60 ml/2 hr, mild hematuria    A/P:  1. S/p CABG x 3 POD #2  2. Hypertension - start lopressor 25 mg bid  3. CAP - rocephin/azithromycin  4. Hx of tobacco abuse - nicotine patch  5. Steroids - prednisone 40 mg daily, tapering  6 hyperlipidemia - crestor 40 mg  7. Air leak - monitor  8. Carotid artery stenosis - will plan to get a CTA of carotids tomorrow, if stenosis > or = to 70% will consult vascular surgery for endarterectomy (likely in 6 weeks)  Encouraged IS/TCDB/amb. Sternal precautions. Will leave chest tubes in place 2/2 air leak. Kong to remain until hematuria resolves. Ok to transfer to step down unit. Will consult hospitalist 2/2 CAP and steriod taper. Continue to monitor.    Cheko Andrade PA-C  [phone]  "

## 2018-11-08 NOTE — PROGRESS NOTES
[facility]    Hospitalist Progress Note      Assessment & Plan : David Richard Schoenecker is a 68-year-old male with a past medical history of dyslipidemia, intolerant to statins, tobacco dependence who was initially admitted to [facility] 11/4 for evaluation of shortness of breath and chest pain found to have a an STEMI.  He was taken for an angiogram on 11/5 which showed severe left main and three-vessel disease.  He was transferred to the ICU with IABP and taken urgently for a three-vessel CABG 11/6.  His hospital course has been mildly complicated by suspected new diagnosis of COPD as well as community-acquired pneumonia with concern for fungal infection.  Pulmonology and infectious disease have been consulted this hospital stay.  Fungal cultures have returned negative and he is being treated for community-acquired pneumonia.  He has been on a prednisone taper.  He was transferred out of the ICU 11/8 and hospital service was consulted by CV surgery to assist with medical co- management      3-vessel CAD status post three-vessel CABG 11/6 with LIMA to LAD, SVG to OM and PDA: Extubated postoperative day 1.  Required pressor support into postop day 2.  Transferred to medical floor by primary service 11/8  --Routine postoperative management per CV surgery  --Currently on ASA 81 mg, atorvastatin 40 mg, metoprolol tartrate 25 mg twice daily and lisinopril 2.5 mg daily    Ischemic cardiomyopathy: EF 35-40% and WMA all consistent with significant NSTEMI.  --Cardiology following  --On beta-blocker and ACE as above  --Diuresis as needed per CV surgery and cardiology     Community Acquired Pneumonia  Suspected new COPD exacerbation:Patient presented with ACS but also described several days of flu symptoms with myalgias, cough. He denies weight loss. Was in [place] for three weeks in 9/2018, patient owns a condo there. CTA was notable for nodular bilateral airspace opacities with mediastinal and hilar  adenopathy suggestive of fungal vs. Bacterial infection vs. Malignancy. Patient is a lifelong smoker. Noted wheezing this admission in the setting of dyspnea.  Fungal studies subsequently returned negative  -- ID following.  Recommend treatment with IV azithromycin and ceftriaxone times 7-10 days followed by 1 week Augmentin.  7-day course to be completed 11/10  --Pulmonology following prior to transfer to ICU.  Received 60 mg prednisone 11/4 and has subsequently been placed on 40 mg daily. Will complete 5 day course tomorrow and plan oral taper (30x 3, 20 x 3, 10 x 3)  --Duonebs qid  -- Encourage IS. Wean O2  -- Repeat CT recommended in 6 weeks and follow up with Pulmonology    Bilateral carotid artery disease, right > 70%  -- Plan for CTA prior to discharge and vascular surgery consult if appropriate     Pre-DM: A1C 5.9  --Postoperative insulin drip DC'd 11 8  --Currently on sliding scale insulin and all BG < 150.   -- Continue SSI and monitor    ABL Anemia/Thrombocytopenia  -- Hgb 8.2. Plt 75. Monitor    Mild Hypernatremia  -- --146  -- Fluids TKO  -- Monitor with diuresis    Tobacco Dependence: 1 ppd smoker on admission. Plans on quitting   -- Nicotine patch    DVT Prophylaxis: Pneumatic Compression Devices  Code Status: Full Code    Disposition: Per CV Surgery    Corrina Velazco    Interval History   Doing well. States breathing feeling better today. Denies pain. No N/V    -Data reviewed today: I reviewed all new labs and imaging results over the last 24 hours. I personally reviewed no images or EKG's today.    Physical Exam   Temp: 98  F (36.7  C) Temp src: Oral BP: 107/66   Heart Rate: 96 Resp: 20 SpO2: 96 % O2 Device: Nasal cannula Oxygen Delivery: 2.5 LPM  Vitals:    11/06/18 0400 11/07/18 0615 11/08/18 0645   Weight: 102.3 kg (225 lb 8.5 oz) 103.7 kg (228 lb 9.9 oz) 102.6 kg (226 lb 3.1 oz)     Vital Signs with Ranges  Temp:  [98  F (36.7  C)-98.7  F (37.1  C)] 98  F (36.7  C)  Heart Rate:   [] 96  Resp:  [10-29] 20  BP: ()/(57-76) 107/66  MAP:  [56 mmHg-78 mmHg] 71 mmHg  Arterial Line BP: ()/(32-61) 117/55  FiO2 (%):  [40 %] 40 %  SpO2:  [88 %-100 %] 96 %  I/O last 3 completed shifts:  In: 1487.81 [P.O.:660; I.V.:827.81]  Out: 1755 [Urine:1175; Chest Tube:580]    Constitutional: Alert, resting comfortably in NAD  Respiratory: Normal effort, prolonged expiratory phase, diffuse wheezing, diminished at bases  Cardiovascular: RRR no murmurs   GI: Non distended, normal bowels sounds, no tenderness or guarding  MSK: LE trace edema. Dorsalis pedis pulse palpated bilaterally.   Skin/Integumen: Clear  Neuro: CN II-XII grossly intact  Psych:  Alert and oriented x 3. Normal affect      Medications     IV fluid REPLACEMENT ONLY       dextrose 5% and 0.45% NaCl + KCl 20 mEq/L Stopped (11/08/18 1500)     EPINEPHrine IV infusion ADULT Stopped (11/07/18 1208)     insulin (regular) Stopped (11/07/18 1725)     nitroGLYcerin Stopped (11/06/18 1426)     phenylephrine IV infusion ADULT Stopped (11/08/18 0215)       acetaminophen  975 mg Oral Q8H     aspirin  81 mg Oral Daily     atorvastatin  40 mg Oral QPM     cefTRIAXone  1 g Intravenous Q24H     gabapentin  100 mg Oral TID     insulin aspart   Subcutaneous TID w/meals     ipratropium - albuterol 0.5 mg/2.5 mg/3 mL  3 mL Nebulization 4x daily     lidocaine  2 patch Transdermal Q24H     lidocaine   Transdermal Q8H     lidocaine   Transdermal Q24h     lisinopril  2.5 mg Oral Daily     loratadine  10 mg Oral Daily     metoprolol tartrate  25 mg Oral BID     nicotine  1 patch Transdermal Daily     nicotine   Transdermal Q8H     nicotine   Transdermal Daily     pantoprazole (PROTONIX) IV  40 mg Intravenous Daily     predniSONE  40 mg Oral Daily     senna-docusate  1 tablet Oral BID    Or     senna-docusate  2 tablet Oral BID     sodium chloride (PF)  3 mL Intracatheter Q8H       Data     Recent Labs  Lab 11/08/18  0345 11/07/18  1445 11/07/18  0404  11/06/18  1304  11/06/18  1130  11/04/18  1128 11/04/18  0848 11/04/18  0338   WBC 9.6  --  10.7 27.8*  --  6.8  < >  --  14.5* 13.9*   HGB 8.2*  --  8.9* 10.9*  < > 8.8*  9.9*  < >  --  12.8* 14.6   *  --  104* 105*  --  105*  < >  --  101* 102*   PLT 75*  --  87* 150  --  73*  < >  --  112* 129*   INR  --   --   --  1.37*  --  1.78*  --   --   --   --    *  --  147* 143  < > 144  142  < >  --   --  140   POTASSIUM 4.8 4.4 4.0 4.8  < > 4.6  4.6  < >  --   --  4.0   CHLORIDE 117*  --  118* 113*  --  113*  < >  --   --  107   CO2 24  --  22 20  --  20  < >  --   --  25   BUN 30  --  31* 35*  --   --   < >  --   --  19   CR 0.95  --  1.27* 1.36*  --   --   < >  --   --  1.03   ANIONGAP 5  --  7 10  --  11  < >  --   --  8   CONOR 7.7*  --  7.7* 7.7*  --   --   < >  --   --  9.0   *  --  127* 177*  --   --   < >  --   --  132*   ALBUMIN  --   --   --   --   --   --   --   --   --  3.9   PROTTOTAL  --   --   --   --   --   --   --   --   --  7.3   BILITOTAL  --   --   --   --   --   --   --   --   --  1.1   ALKPHOS  --   --   --   --   --   --   --   --   --  87   ALT  --   --   --   --   --   --   --   --   --  19   AST  --   --   --   --   --   --   --   --   --  34   TROPI  --   --   --   --   --   --   --  17.825* 13.120* 2.420*   < > = values in this interval not displayed.    No results found for this or any previous visit (from the past 24 hour(s)).

## 2018-11-08 NOTE — PROGRESS NOTES
[facility]    Infectious Disease Progress Note    Date of Service (when I saw the patient): 11/08/2018     Assessment & Plan   David Richard Schoenecker is a 68 year old male who was admitted on 11/4/2018.     Impression:  1. 68 y.o male with ACS.   2. Smoking. ? COPD.   3. Possible pneumonia.   4. For now on CAP treatment.   5. Imaging raising possibility of fungal process vs malignancy. Fungal studies all negative, malignancy still a possibility given smoking history.      Recommendations:   Fungal studies are all negative.   Malignancy work up per primary team.   Treat as bacterial pneumonia with 7 -10 days IV ceftriaxone and azithro and 1 week of Augmentin with FOLLOW UP with pulmonary         Peter Hickman MD    Interval History   S/P CABG   In the ICU feeling better   Physical Exam   Temp: 98.6  F (37  C) Temp src: Oral BP: 107/75   Heart Rate: 98 Resp: 20 SpO2: 97 % O2 Device: Nasal cannula Oxygen Delivery: 2 LPM  Vitals:    11/06/18 0400 11/07/18 0615 11/08/18 0645   Weight: 102.3 kg (225 lb 8.5 oz) 103.7 kg (228 lb 9.9 oz) 102.6 kg (226 lb 3.1 oz)     Vital Signs with Ranges  Temp:  [98  F (36.7  C)-101.7  F (38.7  C)] 98.6  F (37  C)  Heart Rate:  [] 98  Resp:  [10-29] 20  BP: ()/(54-75) 107/75  MAP:  [56 mmHg-78 mmHg] 71 mmHg  Arterial Line BP: ()/(32-61) 117/55  FiO2 (%):  [40 %] 40 %  SpO2:  [88 %-100 %] 97 %    Constitutional: Awake, alert, cooperative, no apparent distress  Lungs: Clear to auscultation bilaterally, no crackles or wheezing  Cardiovascular: Regular rate and rhythm, normal S1 and S2, and no murmur noted  Abdomen: Normal bowel sounds, soft, non-distended, non-tender  Skin: No rashes, no cyanosis, no edema  Other:    Medications     IV fluid REPLACEMENT ONLY       dextrose 5% and 0.45% NaCl + KCl 20 mEq/L 10 mL/hr at 11/08/18 0522     EPINEPHrine IV infusion ADULT Stopped (11/07/18 1208)     insulin (regular) Stopped (11/07/18 1425)     nitroGLYcerin  Stopped (11/06/18 1426)     phenylephrine IV infusion ADULT Stopped (11/08/18 0215)       acetaminophen  975 mg Oral Q8H     aspirin  325 mg Oral Daily     atorvastatin  40 mg Oral QPM     azithromycin  250 mg Intravenous Q24H     cefTRIAXone  1 g Intravenous Q24H     gabapentin  100 mg Oral TID     insulin aspart   Subcutaneous TID w/meals     ipratropium - albuterol 0.5 mg/2.5 mg/3 mL  3 mL Nebulization 4x daily     lidocaine  2 patch Transdermal Q24H     lidocaine   Transdermal Q8H     lidocaine   Transdermal Q24h     lisinopril  2.5 mg Oral Daily     loratadine  10 mg Oral Daily     metoprolol tartrate  25 mg Oral BID     nicotine  1 patch Transdermal Daily     nicotine   Transdermal Q8H     nicotine   Transdermal Daily     pantoprazole (PROTONIX) IV  40 mg Intravenous Daily     predniSONE  40 mg Oral Daily     rosuvastatin  40 mg Oral Daily     senna-docusate  1 tablet Oral BID    Or     senna-docusate  2 tablet Oral BID     sodium chloride (PF)  3 mL Intracatheter Q8H       Data   All microbiology laboratory data reviewed.  Recent Labs   Lab Test  11/08/18   0345  11/07/18   0404  11/06/18   1304   WBC  9.6  10.7  27.8*   HGB  8.2*  8.9*  10.9*   HCT  24.5*  26.3*  33.3*   MCV  104*  104*  105*   PLT  75*  87*  150     Recent Labs   Lab Test  11/08/18   0345  11/07/18   0404  11/06/18   1304   CR  0.95  1.27*  1.36*     No lab results found.  Recent Labs   Lab Test  11/07/18   1853  11/07/18   1719  11/04/18   0955  11/04/18   0720  11/04/18   0338   CULT  No growth after 10 hours  No growth after 10 hours  Moderate growth  Normal man    No growth after 4 days  No growth after 4 days

## 2018-11-08 NOTE — PROGRESS NOTES
"Cardiology Progress Note  Ciscofely Solares         Assessment and Plan:      Non-ST elevation myocardial infarction   postop day 3 after coronary artery bypass surgery, LIMA to the LAD vein graft to the OM and PDA.  Extubated now.  Aortic balloon pump discontinued yesterday.  Inotropes discontinued.  Feels well.  There is some cough.  Weight down by 2 pounds since yesterday.  On metoprolol and aspirin    Ischemic cardiomyopathy  Continue manage fluid status.  as needed diuresis.  Add lisinopril 2.5 mg for unloading of the ventricle if he can tolerate.    Right lung nodule as well as mediastinal hilar lymphadenopathy  Followed by pulmonary.  Given antibiotics empirically.  Per pulmonary, likely also COPD exacerbation is on prednisone, taper.  Infectious disease also following and cultures pending.    Bilateral carotid artery stenosis, right greater than 70%  Asymptomatic, would hold off any CT carotid this week given recent dye exposure.  Will discuss with CV surgery.  Can be worked up early after discharge    Hyperlipidemia  Add atorvastatin 40 mg daily                       Interval History:   awake          Review of Systems:   Mild chest wall pain, cough, low grade fever, rest neg          Physical Exam:        Blood pressure 107/75, pulse 77, temperature 98.6  F (37  C), temperature source Oral, resp. rate 20, height 1.803 m (5' 11\"), weight 102.6 kg (226 lb 3.1 oz), SpO2 97 %.  Vitals:    11/06/18 0400 11/07/18 0615 11/08/18 0645   Weight: 102.3 kg (225 lb 8.5 oz) 103.7 kg (228 lb 9.9 oz) 102.6 kg (226 lb 3.1 oz)     Weights since admission  Vitals:    11/04/18 0444 11/04/18 0822 11/05/18 0604 11/05/18 1800   Weight: 97.1 kg (214 lb) 100.1 kg (220 lb 10.9 oz) 99.3 kg (219 lb) 101.6 kg (223 lb 15.8 oz)    11/06/18 0400 11/07/18 0615 11/08/18 0645   Weight: 102.3 kg (225 lb 8.5 oz) 103.7 kg (228 lb 9.9 oz) 102.6 kg (226 lb 3.1 oz)     Vital Signs with Ranges  Temp:  [98  F (36.7  C)-101.7  F (38.7  C)] 98.6  F " (37  C)  Heart Rate:  [] 98  Resp:  [10-29] 20  BP: ()/(54-75) 107/75  MAP:  [56 mmHg-78 mmHg] 71 mmHg  Arterial Line BP: ()/(32-61) 117/55  FiO2 (%):  [40 %] 40 %  SpO2:  [88 %-100 %] 97 %  I/O's Last 24 hours  I/O last 3 completed shifts:  In: 2125.07 [P.O.:300; I.V.:1512.57]  Out: 2095 [Urine:1345; Chest Tube:750]  Net I/O since admission  11/03 0700 - 11/08 0659  In: 25742.65 [P.O.:780; I.V.:6868.15]  Out: 7140 [Urine:4795]  Net: 4040.65    EXAM:    Constitutional:   Awake, sitting in chair     Head:   Normocephalic, atramatic     Lungs:   rhonchii bilateral     Cardiovascular:   normal S1 and S2 and no murmur noted     Extremities and Back:   Edema  trace     Neurological:   Moves all 4 limbs            Medications:          acetaminophen  975 mg Oral Q8H     aspirin  325 mg Oral Daily     atorvastatin  40 mg Oral QPM     azithromycin  250 mg Intravenous Q24H     cefTRIAXone  1 g Intravenous Q24H     gabapentin  100 mg Oral TID     insulin aspart   Subcutaneous TID w/meals     ipratropium - albuterol 0.5 mg/2.5 mg/3 mL  3 mL Nebulization 4x daily     lidocaine  2 patch Transdermal Q24H     lidocaine   Transdermal Q8H     lidocaine   Transdermal Q24h     lisinopril  2.5 mg Oral Daily     loratadine  10 mg Oral Daily     metoprolol  5 mg Intravenous Q6H     metoprolol tartrate  25 mg Oral BID     nicotine  1 patch Transdermal Daily     nicotine   Transdermal Q8H     nicotine   Transdermal Daily     pantoprazole (PROTONIX) IV  40 mg Intravenous Daily     predniSONE  40 mg Oral Daily     rosuvastatin  40 mg Oral Daily     senna-docusate  1 tablet Oral BID    Or     senna-docusate  2 tablet Oral BID     sodium chloride (PF)  3 mL Intracatheter Q8H            Data:      All new lab and imaging data was reviewed.   Recent Labs   Lab Test  11/08/18   0345  11/07/18   0404  11/06/18   1304   11/06/18   1130   WBC  9.6  10.7  27.8*   --   6.8   HGB  8.2*  8.9*  10.9*   < >  8.8*  9.9*   MCV  104*  104*   105*   --   105*   PLT  75*  87*  150   --   73*   INR   --    --   1.37*   --   1.78*    < > = values in this interval not displayed.      Recent Labs   Lab Test  11/08/18   0345  11/07/18   1445  11/07/18   0404  11/06/18   1304   NA  146*   --   147*  143   POTASSIUM  4.8  4.4  4.0  4.8   CHLORIDE  117*   --   118*  113*   CO2  24   --   22  20   BUN  30   --   31*  35*   CR  0.95   --   1.27*  1.36*   ANIONGAP  5   --   7  10   CONOR  7.7*   --   7.7*  7.7*   GLC  158*   --   127*  177*     Recent Labs   Lab Test  11/04/18   1128  11/04/18   0848  11/04/18   0338   TROPI  17.825*  13.120*  2.420*              Imaging:   Recent Results (from the past 24 hour(s))   XR Chest Port 1 View    Narrative    CHEST PORTABLE ONE VIEW November 7, 2018 6:51 AM     HISTORY: Status post coronary artery bypass graft.    COMPARISON: 11/6/2018.      Impression    IMPRESSION: Lungs are slightly hypoinflated with perihilar and basilar  opacities favored to represent atelectasis. Postoperative changes of  coronary artery bypass grafting noted. Heart size is unchanged. Tubes  and lines are unchanged.    RAFAEL GONSALEZ MD

## 2018-11-08 NOTE — OP NOTE
Procedure Date: 11/06/2018      DATE OF PROCEDURE:  11/06/2018      REFERRING CARDIOLOGIST:  Butch Sellers MD      PREOPERATIVE DIAGNOSIS:  Severe 3-vessel coronary artery disease, non-ST elevation myocardial infarction.      POSTOPERATIVE DIAGNOSIS:  Severe 3-vessel coronary artery disease, non-ST elevation myocardial infarction.      SURGEON:  Sander Wan MD      ASSISTANT:  Rangel Aguilar MD; Cheko Andrade PA-C      NAME OF OPERATION:  Coronary artery bypass grafting x 3 with left internal mammary artery to the left anterior descending, reverse saphenous vein graft to the obtuse marginal artery, reverse saphenous vein graft to the posterior descending artery, endoscopic vein harvest in the left lower extremity, intraoperative BAL.      ANESTHESIA:  General endotracheal.      INDICATIONS FOR PROCEDURE:  Mr. Schoenecker is a very pleasant 68-year-old gentleman who was admitted to the hospital over the weekend with chest pain.  He was found to have severe 3-vessel coronary artery disease including a 95% thrombotic distal left main disease in the Cath Lab yesterday.  An intraaortic balloon pump was placed.  He remained stable and chest pain-free overnight.  He was taken to the operating today for coronary bypass grafting.      OPERATIVE FINDINGS:  The patient had an LVEF of around 30%.  His left internal mammary artery was a good quality conduit measuring 2.5 mm in diameter with excellent flow.  His left greater saphenous vein was a good quality conduit as well measuring up to 4 mm in diameter.  His posterior descending artery was disease free and the probe size was 1.5 mm.  His high obtuse marginal artery had mild disease and the probe size was 1.5 mm as well.  We examined his circumflex coronary artery and looked for a distal circumflex marginal vessel, but there was nothing large enough to bypass.  His mid-LAD was relatively disease free and the probe size was also 1.5 mm.      DESCRIPTION OF PROCEDURE:  After  informed consent was obtained the patient brought down to the operating room, was placed on the OR table in a supine position.  Intravenous and intra-arterial lines were begun.  While monitoring his blood pressure and EKG tracing he was anesthetized and intubated using a single lumen endotracheal tube.  A [place]-Mar[place] catheter was also placed.  The patient had an intraoperative balloon pump via the right common femoral artery preop.  His entire chest, abdomen, both groins and legs were prepped circumferentially down to the toes using multiple layers of DuraPrep.  He was draped in a sterile field.  A median sternotomy was performed and in the meantime, the left greater saphenous vein was harvested endoscopically.  The left internal mammary artery was taken down.  Prior to clipping the LIMA distally, the patient was fully heparinized.  The sternal edges were retracted laterally and the pericardium was opened to suspend the heart in a pericardial cradle.  The balloon pump was shut off.  The ascending aorta and the right atrial appendage were cannulated.  A retrograde cardioplegia catheter was placed in the coronary sinus without difficulty.  An antegrade needle/aortic root was placed in the ascending aorta as well.  After appropriate ACT level was achieved, cardiopulmonary bypass was established.  The patient was kept normothermic during entire operation.  The aorta was then crossclamped and antegrade cold blood cardioplegia was given to arrest the heart.  The patient went into good diastolic arrest without any LV distention.  Following this, intermittent retrograde cardioplegia doses were given on average every 15 minutes for myocardial protection while the aorta was crossclamped.      The first coronary vessel grafted was the posterior descending artery.  Conduit used was a saphenous vein.  This anastomosis was performed in an end-to-side fashion using running 7-0 Prolene.  Next, the obtuse marginal artery was grafted.   Another saphenous vein was used as a conduit.  This anastomosis was also performed in an end-to-side fashion using running 7-0 Prolene.  Next, the LIMA to LAD anastomosis was performed.  This was also done in an end-to-side fashion using running 7-0 Prolene.  This anastomosis was protected by tacking the LIMA pedicle down to the epicardium using interrupted 6-0 Prolene x 2.  Next, two 4 mm aortotomies were created in the ascending aorta and the 2 proximal vein anastomoses were performed in end-to-side fashion using running 6-0 Prolene.  The partial clamp was removed.  The patient was weaned off cardiopulmonary bypass with epinephrine and Jason-Synephrine support.  Aortic crossclamp time was 74 minutes.  Total cardiopulmonary bypass time was 88 minutes.  Once the patient remained stable off bypass, the venous cannula was removed and protamine was administered.  The aortic cannula was subsequently removed as well.  An intraoperative balloon pump was restarted at 1:1 ratio.  Temporary ventricular pacing wires placed in the RV muscle.  A 32-Sinhala straight chest tube was placed into the mediastinum as well.  These were all brought out percutaneously below the sternotomy incision and secured to skin using 2-0 Ethibond.  The right pleural space was slightly opened.  The mediastinum was irrigated with antibiotic saline and hemostasis achieved.  The sternum was reapproximated using multiple interrupted single and double wires.  The incision was closed in layers of running Vicryl suture.  Skin was closed using 3-0 Vicryl and was sealed using Dermabond.      There were no intraoperative complications and the patient tolerated the operation well.  Two packs of platelets were given intraoperatively.  All sponge counts, needle counts and instrument counts were correct x 2 at the end the operation.  EBL:  Unknown.  Specimen removed:  None.      The patient was brought to the ICU in hemodynamically stable condition and remained  intubated.         LOUIS BACH MD             D: 2018   T: 2018   MT: NELSON      Name:     SCHOENECKER, DAVID   MRN:      [id number]        Account:        [id number]   :      1950           Procedure Date: 2018      Document: [id number]

## 2018-11-08 NOTE — PROVIDER NOTIFICATION
Notified Dr. Godinez regarding pink colored urine over the last four hours. Voiding in good amounts, pt asymptomatic, pressors off and balloon pump was removed yesterday afternoon. Morning labs ordered and continue to monitor.

## 2018-11-08 NOTE — PROVIDER NOTIFICATION
TAYLOR Still from CV surgery notified of patient having crepitus in chest. No changed in O2 sats and patient states no change in breathing. Monitor for now

## 2018-11-08 NOTE — PROGRESS NOTES
11/08/18 1526   Quick Adds   Type of Visit Initial Occupational Therapy Evaluation   Living Environment   Lives With spouse   Living Arrangements house   Home Accessibility stairs to enter home;stairs within home;tub/shower is not walk in   Number of Stairs to Enter Home 2   Number of Stairs Within Home 14   Transportation Available family or friend will provide   Living Environment Comment Above is reflective of daughter's home in [place] where family prefers him to recover versus return home w/just spouse to their log cabin home in [place].    Self-Care   Dominant Hand left   Usual Activity Tolerance moderate   Current Activity Tolerance poor   Regular Exercise no   Equipment Currently Used at Home none   Functional Level Prior   Ambulation 0-->independent   Transferring 0-->independent   Toileting 0-->independent   Bathing 0-->independent   Dressing 0-->independent   Eating 0-->independent   Communication 0-->understands/communicates without difficulty   Swallowing 0-->swallows foods/liquids without difficulty   Cognition 0 - no cognition issues reported   Fall history within last six months no   Which of the above functional risks had a recent onset or change? none   Prior Functional Level Comment Pt indep with all IADLs, outdoor work (cut down 70 trees just prior to admit), indep all mobility    General Information   Onset of Illness/Injury or Date of Surgery - Date 11/04/18   Referring Physician Cheko VAZQUEZ   Patient/Family Goals Statement family hopes pt will dc to daughter's home w/spouse as closer to other kids/[place]   Additional Occupational Profile Info/Pertinent History of Current Problem 69yo male admitted with onset chest pain, GRANADOS. Angio on 11/5 found 3-vessel ds and Coronary artery bypass grafting x 3 with left internal mammary artery to the left anterior descending, reverse saphenous vein graft to the obtuse marginal artery, reverse saphenous vein graft to the posterior descending  artery, endoscopic vein harvest in the left lower extremity, intraoperative BAL. PMH includes smoking x 50+yrs, HLP (untreated x many years as he reports he had many reactions to statins).   Precautions/Limitations fall precautions;sternal precautions;oxygen therapy device and L/min   General Info Comments Pt had just cleared approx 70 trees off their property leading up to admit.   Cognitive Status Examination   Orientation orientation to person, place and time   Level of Consciousness alert   Able to Follow Commands WNL/WFL   Visual Perception   Visual Perception No deficits were identified   Pain Assessment   Patient Currently in Pain Yes, see Vital Sign flowsheet   Range of Motion (ROM)   ROM Quick Adds No deficits were identified   Mobility   Bed Mobility Comments Mod A of 1   Transfer Skill: Bed to Chair/Chair to Bed   Level of [place]: Bed to Chair minimum assist (75% patients effort)   Physical Assist/Nonphysical Assist: Bed to Chair 1 person assist;verbal cues   Transfer Skill: Sit to Stand   Level of [place]: Sit/Stand contact guard   Physical Assist/Nonphysical Assist: Sit/Stand 1 person assist;verbal cues   Balance   Balance Comments CGA static standing   Instrumental Activities of Daily Living (IADL)   Previous Responsibilities meal prep;shopping;yardwork;medication management;finances;driving   Activities of Daily Living Analysis   Impairments Contributing to Impaired Activities of Daily Living pain;post surgical precautions;strength decreased   General Therapy Interventions   Planned Therapy Interventions ADL retraining;transfer training;visual perception;home program guidelines;progressive activity/exercise   Clinical Impression   Criteria for Skilled Therapeutic Interventions Met yes, treatment indicated   OT Diagnosis decreased ADL/IADL performance   Influenced by the following impairments post-op sternal precautions, incisional pain, general weakness   Assessment of Occupational  "Performance 5 or more Performance Deficits   Identified Performance Deficits dressing, toileting, bathing, functional mob, household mgmt, IADL tasks (lawn care, shopping, driving)   Clinical Decision Making (Complexity) Low complexity   Therapy Frequency 2 times/day   Predicted Duration of Therapy Intervention (days/wks) 7 days   Anticipated Discharge Disposition Home with Outpatient Therapy   Risks and Benefits of Treatment have been explained. Yes   Patient, Family & other staff in agreement with plan of care Yes   [facility] TM \"6 Clicks\"   2016, Trustees of [facility], under license to [employer].  All rights reserved.   6 Clicks Short Forms Daily Activity Inpatient Short Form   [facility] AM-PAC  \"6 Clicks\" Daily Activity Inpatient Short Form   1. Putting on and taking off regular lower body clothing? 2 - A Lot   2. Bathing (including washing, rinsing, drying)? 2 - A Lot   3. Toileting, which includes using toilet, bedpan or urinal? 3 - A Little   4. Putting on and taking off regular upper body clothing? 3 - A Little   5. Taking care of personal grooming such as brushing teeth? 4 - None   6. Eating meals? 4 - None   Daily Activity Raw Score (Score out of 24.Lower scores equate to lower levels of function) 18   Total Evaluation Time   Total Evaluation Time (Minutes) 15     "

## 2018-11-09 ENCOUNTER — APPOINTMENT (OUTPATIENT)
Dept: OCCUPATIONAL THERAPY | Facility: CLINIC | Age: 68
DRG: 233 | End: 2018-11-09
Payer: COMMERCIAL

## 2018-11-09 ENCOUNTER — APPOINTMENT (OUTPATIENT)
Dept: GENERAL RADIOLOGY | Facility: CLINIC | Age: 68
DRG: 233 | End: 2018-11-09
Attending: PHYSICIAN ASSISTANT
Payer: COMMERCIAL

## 2018-11-09 LAB
ANION GAP SERPL CALCULATED.3IONS-SCNC: 6 MMOL/L (ref 3–14)
BACTERIA SPEC CULT: NO GROWTH
BUN SERPL-MCNC: 32 MG/DL (ref 7–30)
CALCIUM SERPL-MCNC: 8.4 MG/DL (ref 8.5–10.1)
CHLORIDE SERPL-SCNC: 115 MMOL/L (ref 94–109)
CO2 SERPL-SCNC: 25 MMOL/L (ref 20–32)
CREAT SERPL-MCNC: 0.99 MG/DL (ref 0.66–1.25)
ERYTHROCYTE [DISTWIDTH] IN BLOOD BY AUTOMATED COUNT: 12.9 % (ref 10–15)
GFR SERPL CREATININE-BSD FRML MDRD: 75 ML/MIN/1.7M2
GLUCOSE BLDC GLUCOMTR-MCNC: 141 MG/DL (ref 70–99)
GLUCOSE BLDC GLUCOMTR-MCNC: 156 MG/DL (ref 70–99)
GLUCOSE BLDC GLUCOMTR-MCNC: 173 MG/DL (ref 70–99)
GLUCOSE BLDC GLUCOMTR-MCNC: 218 MG/DL (ref 70–99)
GLUCOSE BLDC GLUCOMTR-MCNC: 98 MG/DL (ref 70–99)
GLUCOSE SERPL-MCNC: 135 MG/DL (ref 70–99)
HCT VFR BLD AUTO: 27.2 % (ref 40–53)
HGB BLD-MCNC: 9.2 G/DL (ref 13.3–17.7)
MCH RBC QN AUTO: 35 PG (ref 26.5–33)
MCHC RBC AUTO-ENTMCNC: 33.8 G/DL (ref 31.5–36.5)
MCV RBC AUTO: 103 FL (ref 78–100)
PLATELET # BLD AUTO: 101 10E9/L (ref 150–450)
POTASSIUM SERPL-SCNC: 4.7 MMOL/L (ref 3.4–5.3)
RBC # BLD AUTO: 2.63 10E12/L (ref 4.4–5.9)
SODIUM SERPL-SCNC: 146 MMOL/L (ref 133–144)
SPECIMEN SOURCE: NORMAL
WBC # BLD AUTO: 11.4 10E9/L (ref 4–11)

## 2018-11-09 PROCEDURE — 25000132 ZZH RX MED GY IP 250 OP 250 PS 637: Performed by: HOSPITALIST

## 2018-11-09 PROCEDURE — 80048 BASIC METABOLIC PNL TOTAL CA: CPT | Performed by: PHYSICIAN ASSISTANT

## 2018-11-09 PROCEDURE — 12000007 ZZH R&B INTERMEDIATE

## 2018-11-09 PROCEDURE — 25000132 ZZH RX MED GY IP 250 OP 250 PS 637: Performed by: PHYSICIAN ASSISTANT

## 2018-11-09 PROCEDURE — 94640 AIRWAY INHALATION TREATMENT: CPT

## 2018-11-09 PROCEDURE — 36415 COLL VENOUS BLD VENIPUNCTURE: CPT | Performed by: PHYSICIAN ASSISTANT

## 2018-11-09 PROCEDURE — 97535 SELF CARE MNGMENT TRAINING: CPT | Mod: GO

## 2018-11-09 PROCEDURE — 71046 X-RAY EXAM CHEST 2 VIEWS: CPT

## 2018-11-09 PROCEDURE — 99232 SBSQ HOSP IP/OBS MODERATE 35: CPT | Performed by: INTERNAL MEDICINE

## 2018-11-09 PROCEDURE — 00000146 ZZHCL STATISTIC GLUCOSE BY METER IP

## 2018-11-09 PROCEDURE — 40000275 ZZH STATISTIC RCP TIME EA 10 MIN

## 2018-11-09 PROCEDURE — 99207 ZZC CDG-MDM COMPONENT: MEETS LOW - DOWN CODED: CPT | Performed by: INTERNAL MEDICINE

## 2018-11-09 PROCEDURE — 97110 THERAPEUTIC EXERCISES: CPT | Mod: GO

## 2018-11-09 PROCEDURE — 25000125 ZZHC RX 250: Performed by: PHYSICIAN ASSISTANT

## 2018-11-09 PROCEDURE — 25000132 ZZH RX MED GY IP 250 OP 250 PS 637: Performed by: INTERNAL MEDICINE

## 2018-11-09 PROCEDURE — 25000128 H RX IP 250 OP 636: Performed by: INTERNAL MEDICINE

## 2018-11-09 PROCEDURE — 40000133 ZZH STATISTIC OT WARD VISIT

## 2018-11-09 PROCEDURE — 85027 COMPLETE CBC AUTOMATED: CPT | Performed by: PHYSICIAN ASSISTANT

## 2018-11-09 PROCEDURE — 25000132 ZZH RX MED GY IP 250 OP 250 PS 637

## 2018-11-09 PROCEDURE — 94640 AIRWAY INHALATION TREATMENT: CPT | Mod: 76

## 2018-11-09 PROCEDURE — 25000132 ZZH RX MED GY IP 250 OP 250 PS 637: Performed by: SURGERY

## 2018-11-09 PROCEDURE — 25000125 ZZHC RX 250: Performed by: SURGERY

## 2018-11-09 RX ORDER — ASPIRIN 81 MG/1
81 TABLET ORAL DAILY
Status: DISCONTINUED | OUTPATIENT
Start: 2018-11-10 | End: 2018-11-12 | Stop reason: HOSPADM

## 2018-11-09 RX ORDER — ACETAMINOPHEN 325 MG/1
975 TABLET ORAL EVERY 8 HOURS
Status: DISPENSED | OUTPATIENT
Start: 2018-11-09 | End: 2018-11-12

## 2018-11-09 RX ORDER — PANTOPRAZOLE SODIUM 40 MG/1
40 TABLET, DELAYED RELEASE ORAL
Status: DISCONTINUED | OUTPATIENT
Start: 2018-11-09 | End: 2018-11-12 | Stop reason: HOSPADM

## 2018-11-09 RX ADMIN — ATORVASTATIN CALCIUM 40 MG: 40 TABLET, FILM COATED ORAL at 20:53

## 2018-11-09 RX ADMIN — IPRATROPIUM BROMIDE AND ALBUTEROL SULFATE 3 ML: 2.5; .5 SOLUTION RESPIRATORY (INHALATION) at 19:51

## 2018-11-09 RX ADMIN — IPRATROPIUM BROMIDE AND ALBUTEROL SULFATE 3 ML: 2.5; .5 SOLUTION RESPIRATORY (INHALATION) at 15:48

## 2018-11-09 RX ADMIN — METOPROLOL TARTRATE 25 MG: 25 TABLET ORAL at 20:53

## 2018-11-09 RX ADMIN — METOPROLOL TARTRATE 25 MG: 25 TABLET ORAL at 08:42

## 2018-11-09 RX ADMIN — LORATADINE 10 MG: 10 TABLET ORAL at 08:43

## 2018-11-09 RX ADMIN — IPRATROPIUM BROMIDE AND ALBUTEROL SULFATE 3 ML: 2.5; .5 SOLUTION RESPIRATORY (INHALATION) at 07:26

## 2018-11-09 RX ADMIN — LISINOPRIL 2.5 MG: 2.5 TABLET ORAL at 08:43

## 2018-11-09 RX ADMIN — GABAPENTIN 100 MG: 100 CAPSULE ORAL at 08:43

## 2018-11-09 RX ADMIN — ACETAMINOPHEN 975 MG: 325 TABLET, FILM COATED ORAL at 20:53

## 2018-11-09 RX ADMIN — PREDNISONE 40 MG: 20 TABLET ORAL at 08:42

## 2018-11-09 RX ADMIN — ACETAMINOPHEN 975 MG: 325 TABLET, FILM COATED ORAL at 11:47

## 2018-11-09 RX ADMIN — CEFTRIAXONE 1 G: 1 INJECTION, POWDER, FOR SOLUTION INTRAMUSCULAR; INTRAVENOUS at 06:48

## 2018-11-09 RX ADMIN — NICOTINE 1 PATCH: 14 PATCH, EXTENDED RELEASE TRANSDERMAL at 08:43

## 2018-11-09 RX ADMIN — ACETAMINOPHEN 975 MG: 325 TABLET, FILM COATED ORAL at 03:58

## 2018-11-09 RX ADMIN — IPRATROPIUM BROMIDE AND ALBUTEROL SULFATE 3 ML: 2.5; .5 SOLUTION RESPIRATORY (INHALATION) at 11:34

## 2018-11-09 RX ADMIN — PANTOPRAZOLE SODIUM 40 MG: 40 TABLET, DELAYED RELEASE ORAL at 08:42

## 2018-11-09 RX ADMIN — SENNOSIDES AND DOCUSATE SODIUM 2 TABLET: 8.6; 5 TABLET ORAL at 08:42

## 2018-11-09 RX ADMIN — LIDOCAINE 2 PATCH: 560 PATCH PERCUTANEOUS; TOPICAL; TRANSDERMAL at 08:49

## 2018-11-09 RX ADMIN — ASPIRIN 325 MG: 325 TABLET, DELAYED RELEASE ORAL at 08:43

## 2018-11-09 ASSESSMENT — ACTIVITIES OF DAILY LIVING (ADL)
ADLS_ACUITY_SCORE: 11

## 2018-11-09 NOTE — PLAN OF CARE
Problem: Patient Care Overview  Goal: Plan of Care/Patient Progress Review  Discharge Planner OT   Patient plan for discharge: home with assist   Current status:  Pt went from supine to sit EOB with minimum-moderate assist. Pt went from sit<>Stand on 3 occasions with CGA. Pt transferred to the bedside chair with CGA. Pt ambulated within his room for 30 feet with CGA. Pt demonstrated dyspnea and SOB. Patient s oxygen destats with activity, see vitals flowsheet for details.   Barriers to return to prior living situation: Current level of assist; stairs; respiratory demands   Recommendations for discharge: home w/family assist and OP CR at [place]  Rationale for recommendations: pt will benefit from OPCR for progressive monitored exercise and further education in CAD management to maximize recovery and general cardiac health       Entered by: Adrian Mcginnis 11/09/2018 8:34 AM

## 2018-11-09 NOTE — PROGRESS NOTES
"Cardiology Progress Note  Cisco Solares         Assessment and Plan:      Non-ST elevation myocardial infarction   postop day 4 after coronary artery bypass surgery, LIMA to the LAD vein graft to the OM and PDA.  Feels well.  There is some cough.  Weight stable  On metoprolol and aspirin  On low-dose lisinopril.  We will switch aspirin from 325-81 mg daily    Ischemic cardiomyopathy  Continue manage fluid status.  as needed diuresis.  On lisinopril    Right lung nodule as well as mediastinal hilar lymphadenopathy  Followed by pulmonary.  Given antibiotics empirically.  Per pulmonary, likely also COPD exacerbation is on prednisone, taper.  Infectious disease has signed off.  Pulmonary wants to see him back in 6 weeks with a repeat CT.  Should be arranged at discharge.  Likely, chest tube will be discontinued today    Bilateral carotid artery stenosis, right greater than 70%  Asymptomatic, would hold off any CT carotid this week given recent dye exposure.  Will discuss with CV surgery.  Can be worked up early after discharge  Would recommend follow-up as an outpatient, consider CT carotids if creatinine stable as an outpatient in few weeks and then as needed, refer to vascular    Hyperlipidemia  Add atorvastatin 40 mg daily    Overall, stable from cardiac perspective.  Patient is going to go to her daughter's place in white bed and wants to follow for rehab at [place] and can see our cardiology LAURITA in [place].  We will see him on as needed basis.  Recall if questions.                   Interval History:   awake          Review of Systems:   Mild chest wall pain, cough,          Physical Exam:        Blood pressure 107/64, pulse 91, temperature 98.3  F (36.8  C), temperature source Oral, resp. rate 16, height 1.803 m (5' 11\"), weight 103.3 kg (227 lb 11.8 oz), SpO2 95 %.  Vitals:    11/07/18 0615 11/08/18 0645 11/09/18 0400   Weight: 103.7 kg (228 lb 9.9 oz) 102.6 kg (226 lb 3.1 oz) 103.3 kg (227 lb 11.8 oz) "     Weights since admission  Vitals:    11/04/18 0444 11/04/18 0822 11/05/18 0604 11/05/18 1800   Weight: 97.1 kg (214 lb) 100.1 kg (220 lb 10.9 oz) 99.3 kg (219 lb) 101.6 kg (223 lb 15.8 oz)    11/06/18 0400 11/07/18 0615 11/08/18 0645 11/09/18 0400   Weight: 102.3 kg (225 lb 8.5 oz) 103.7 kg (228 lb 9.9 oz) 102.6 kg (226 lb 3.1 oz) 103.3 kg (227 lb 11.8 oz)     Vital Signs with Ranges  Temp:  [97.7  F (36.5  C)-98.7  F (37.1  C)] 98.3  F (36.8  C)  Pulse:  [91] 91  Heart Rate:  [] 84  Resp:  [16-20] 16  BP: (100-122)/(51-70) 107/64  SpO2:  [80 %-99 %] 95 %  I/O's Last 24 hours  I/O last 3 completed shifts:  In: 1010 [P.O.:810; I.V.:200]  Out: 1525 [Urine:1065; Chest Tube:460]  Net I/O since admission  11/04 0700 - 11/09 0659  In: 77288.65 [P.O.:1590; I.V.:7068.15]  Out: 8665 [Urine:5860]  Net: 2325.65    EXAM:    Constitutional:   Awake, sitting in chair     Head:   Normocephalic, atramatic     Lungs:   rhonchii bilateral     Cardiovascular:   normal S1 and S2 and no murmur noted     Extremities and Back:   Edema  trace     Neurological:   Moves all 4 limbs            Medications:          acetaminophen  975 mg Oral Q8H     [START ON 11/10/2018] aspirin  81 mg Oral Daily     atorvastatin  40 mg Oral QPM     cefTRIAXone  1 g Intravenous Q24H     gabapentin  100 mg Oral TID     insulin aspart  1-7 Units Subcutaneous TID AC     insulin aspart  1-5 Units Subcutaneous At Bedtime     ipratropium - albuterol 0.5 mg/2.5 mg/3 mL  3 mL Nebulization 4x daily     lidocaine  2 patch Transdermal Q24H     lidocaine   Transdermal Q8H     lidocaine   Transdermal Q24h     lisinopril  2.5 mg Oral Daily     loratadine  10 mg Oral Daily     metoprolol tartrate  25 mg Oral BID     nicotine  1 patch Transdermal Daily     nicotine   Transdermal Q8H     nicotine   Transdermal Daily     pantoprazole  40 mg Oral QAM AC     [START ON 11/10/2018] predniSONE  30 mg Oral Daily    Followed by     [START ON 11/13/2018] predniSONE  20 mg  Oral Daily    Followed by     [START ON 11/16/2018] predniSONE  10 mg Oral Daily     senna-docusate  1 tablet Oral BID    Or     senna-docusate  2 tablet Oral BID     sodium chloride (PF)  3 mL Intracatheter Q8H            Data:      All new lab and imaging data was reviewed.   Recent Labs   Lab Test  11/09/18   0735  11/08/18   0345  11/07/18   0404  11/06/18   1304   11/06/18   1130   WBC  11.4*  9.6  10.7  27.8*   --   6.8   HGB  9.2*  8.2*  8.9*  10.9*   < >  8.8*  9.9*   MCV  103*  104*  104*  105*   --   105*   PLT  101*  75*  87*  150   --   73*   INR   --    --    --   1.37*   --   1.78*    < > = values in this interval not displayed.      Recent Labs   Lab Test  11/09/18   0735  11/08/18   0345  11/07/18   1445  11/07/18   0404   NA  146*  146*   --   147*   POTASSIUM  4.7  4.8  4.4  4.0   CHLORIDE  115*  117*   --   118*   CO2  25  24   --   22   BUN  32*  30   --   31*   CR  0.99  0.95   --   1.27*   ANIONGAP  6  5   --   7   CONOR  8.4*  7.7*   --   7.7*   GLC  135*  158*   --   127*     Recent Labs   Lab Test  11/04/18   1128  11/04/18   0848  11/04/18   0338   TROPI  17.825*  13.120*  2.420*              Imaging:   Recent Results (from the past 24 hour(s))   XR Chest Port 1 View    Narrative    CHEST PORTABLE ONE VIEW November 7, 2018 6:51 AM     HISTORY: Status post coronary artery bypass graft.    COMPARISON: 11/6/2018.      Impression    IMPRESSION: Lungs are slightly hypoinflated with perihilar and basilar  opacities favored to represent atelectasis. Postoperative changes of  coronary artery bypass grafting noted. Heart size is unchanged. Tubes  and lines are unchanged.    RAFAEL GONSALEZ MD

## 2018-11-09 NOTE — PLAN OF CARE
Problem: Cardiac: ACS (Acute Coronary Syndrome) (Adult)  Goal: Signs and Symptoms of Listed Potential Problems Will be Absent, Minimized or Managed (Cardiac: ACS)  Signs and symptoms of listed potential problems will be absent, minimized or managed by discharge/transition of care (reference Cardiac: ACS (Acute Coronary Syndrome) (Adult) CPG).   Outcome: Therapy, progress towards functional goals is fair  A/OX4. Tele: SR w/ PVC's, on O2 2L nc. GRANADOS. Sternal incision, CDI/ARIANA. CT to water seal w/ serosanguinous output,AL and crepitus present. Pacer wires capped.  LS dim/ exp wheezes, nebs as ordered.CMS intact. +BS,+flatus, no BM yet. Steinberg patent w/ adequate UOP. Pain managed w/ eual Tylenol.

## 2018-11-09 NOTE — PROGRESS NOTES
[facility]  Hospitalist Progress Note  Bladimir Hameed MD    Assessment & Plan   David Richard Schoenecker is a 68-year-old male with a past medical history notable for dyslipidemia, and tobacco dependence who was initially admitted to [facility] 11/4 for evaluation of shortness of breath and chest pain found to have a NSTEMI.  He was taken for an angiogram on 11/5 which showed severe left main and three-vessel disease.  He was transferred to the ICU with IABP and taken urgently for a three-vessel CABG 11/6.  His hospital course has been complicated by suspected new diagnosis of COPD as well as community-acquired pneumonia with concern for fungal infection.  Pulmonology and infectious disease have been consulted this hospital stay.  Fungal cultures have returned negative and he is being treated for community-acquired pneumonia.  He has been on a prednisone taper.  He was transferred out of the ICU 11/8 and hospital service was consulted by CV surgery to assist with medical co- management      NSTEMI,  3-vessel CAD status post three-vessel CABG 11/6 with LIMA to LAD, SVG to OM and PDA:   Extubated postoperative day 1.  Required pressor support into postop day 2.  Transferred to medical floor by primary service 11/8  --Routine postoperative management per CV surgery  --Currently on ASA 81 mg, atorvastatin 40 mg, metoprolol tartrate 25 mg twice daily and lisinopril 2.5 mg daily     Ischemic cardiomyopathy:   LVEF 35-40% and WMA all consistent with significant NSTEMI.  --Cardiology following  --On beta-blocker and ACEI as above  --Diuresis as needed per CV surgery and cardiology      Community Acquired Pneumonia,  Suspected undiagnised COPD with acute exacerbation:  Patient presented with ACS but also described several days of flu symptoms with myalgias and cough. Was in [place] for three weeks in 9/2018, patient owns a condo there. CTA was notable for nodular bilateral airspace opacities with  mediastinal and hilar adenopathy suggestive of fungal vs bacterial infection vs malignancy. Patient is a lifelong smoker. Noted wheezing this admission in the setting of dyspnea.  Fungal studies subsequently returned negative  --ID following.  Recommend treatment as bacterial pneumonia with IV azithromycin and ceftriaxone x 7-10 days followed by 1 week Augmentin. 7-day course to be completed 11/10.  --Last CXR 11/07 showed slightly hypoinflated lungs with perihilar and basilar opacities favored to represent atelectasis.   --Pulmonology following prior to transfer to ICU.  Received 60 mg prednisone 11/4 and was subsequently placed on 40 mg daily. Currently on oral taper (30x 3, 20 x 3, 10 x 3)  --Duonebs qid  --Encourage IS. Wean O2  --PFT as outpatient  --Repeat CT recommended in 6 weeks and follow up with Pulmonology    Dyslipidemia:  FLP 11/05 with , HDL 36, , .  --Started on atorvastatin 40 mg po daily     Bilateral carotid artery disease, right > 70%  --Plan for CTA prior to discharge and vascular surgery consult if appropriate      Pre-DM: A1C 5.9  --Postoperative insulin drip DC'd 11 8  --Currently on sliding scale insulin and all BG < 160  --Continue SSI and monitor     ABL Anemia/Acute thrombocytopenia,   Due to CABG.    Recent Labs  Lab 11/09/18  0735 11/08/18  0345 11/07/18  0404 11/06/18  1304 11/06/18  1204 11/06/18  1130 11/06/18  1058 11/06/18  1032 11/06/18  1000 11/06/18  0758   HGB 9.2* 8.2* 8.9* 10.9* 9.5* 8.8*  9.9* 10.2* 9.5* 9.9* 10.2*       Recent Labs  Lab 11/09/18  0735 11/08/18  0345 11/07/18  0404 11/06/18  1304 11/06/18  1130 11/06/18  0300   * 75* 87* 150 73* 98*     --Continue to monitor     Mild Hypernatremia  -- -147  -- Fluids TKO  -- Continue to monitor      Tobacco Dependence:   1 ppd smoker on admission.   --Counseled on cessation  --Nicotine patch      Active Diet Order      Advance Diet as Tolerated: Regular Diet Adult    DVT Prophylaxis:  Pneumatic Compression Devices  Code Status: Full Code   Disposition: Per CV Surgery    D/W RN.    Time Spent on this Encounter   I spent 35 minutes on the unit/floor managing the care of Genaro Whaley Schoeneckroberto. Over 50% of my time was spent counseling the patient and/or coordinating care regarding services listed in this note.      Interval History   Patient feels well. He reports cough but no dyspnea or chest pain. No new issues overnight. NSR is maintained.    Data reviewed today: I reviewed all new labs and imaging over the last 24 hours. I personally reviewed no images or EKG's today.    Physical Exam   Temp: 97.7  F (36.5  C) Temp src: Axillary BP: 111/63   Heart Rate: 84 Resp: 16 SpO2: 99 % O2 Device: Nasal cannula Oxygen Delivery: 2 LPM  Vitals:    11/07/18 0615 11/08/18 0645 11/09/18 0400   Weight: 103.7 kg (228 lb 9.9 oz) 102.6 kg (226 lb 3.1 oz) 103.3 kg (227 lb 11.8 oz)     Vital Signs with Ranges  Temp:  [97.7  F (36.5  C)-98.7  F (37.1  C)] 97.7  F (36.5  C)  Heart Rate:  [] 84  Resp:  [16-20] 16  BP: ()/(57-76) 111/63  SpO2:  [96 %-99 %] 99 %  I/O's Last 24 hours  I/O last 3 completed shifts:  In: 1010 [P.O.:810; I.V.:200]  Out: 1525 [Urine:1065; Chest Tube:460]    Constitutional: Comfortable, no acute distress  HEENT: + conjunctival pallor.  Neurologic: Awake, alert  Neck: Supple, no elevated JVP  Respiratory: B/L diffuse wheezing and rhonchi  Cardiovascular: Normal S1 and S2. Regular rhythm and rate, no murmur  GI: Abdomen soft, non tender, non distended  Extremities: No calf tenderness, 1+ B/L LE edema  Skin/integument: No acute rash, no cyanosis    Medications   All medications were reviewed.    IV fluid REPLACEMENT ONLY       dextrose 5% and 0.45% NaCl + KCl 20 mEq/L Stopped (11/08/18 1500)     insulin (regular) Stopped (11/07/18 1725)       acetaminophen  975 mg Oral Q8H     aspirin  325 mg Oral Daily     atorvastatin  40 mg Oral QPM     cefTRIAXone  1 g Intravenous Q24H      gabapentin  100 mg Oral TID     insulin aspart   Subcutaneous TID w/meals     ipratropium - albuterol 0.5 mg/2.5 mg/3 mL  3 mL Nebulization 4x daily     lidocaine  2 patch Transdermal Q24H     lidocaine   Transdermal Q8H     lidocaine   Transdermal Q24h     lisinopril  2.5 mg Oral Daily     loratadine  10 mg Oral Daily     metoprolol tartrate  25 mg Oral BID     nicotine  1 patch Transdermal Daily     nicotine   Transdermal Q8H     nicotine   Transdermal Daily     pantoprazole  40 mg Oral QAM AC     [START ON 11/10/2018] predniSONE  30 mg Oral Daily    Followed by     [START ON 11/13/2018] predniSONE  20 mg Oral Daily    Followed by     [START ON 11/16/2018] predniSONE  10 mg Oral Daily     predniSONE  40 mg Oral Daily     senna-docusate  1 tablet Oral BID    Or     senna-docusate  2 tablet Oral BID     sodium chloride (PF)  3 mL Intracatheter Q8H        Data     Recent Labs  Lab 11/09/18  0735 11/08/18  0345 11/07/18  1445 11/07/18  0404 11/06/18  1304  11/06/18  1130  11/04/18  1128 11/04/18  0848 11/04/18  0338   WBC 11.4* 9.6  --  10.7 27.8*  --  6.8  < >  --  14.5* 13.9*   HGB 9.2* 8.2*  --  8.9* 10.9*  < > 8.8*  9.9*  < >  --  12.8* 14.6   * 104*  --  104* 105*  --  105*  < >  --  101* 102*   * 75*  --  87* 150  --  73*  < >  --  112* 129*   INR  --   --   --   --  1.37*  --  1.78*  --   --   --   --    NA  --  146*  --  147* 143  < > 144  142  < >  --   --  140   POTASSIUM  --  4.8 4.4 4.0 4.8  < > 4.6  4.6  < >  --   --  4.0   CHLORIDE  --  117*  --  118* 113*  --  113*  < >  --   --  107   CO2  --  24  --  22 20  --  20  < >  --   --  25   BUN  --  30  --  31* 35*  --   --   < >  --   --  19   CR  --  0.95  --  1.27* 1.36*  --   --   < >  --   --  1.03   ANIONGAP  --  5  --  7 10  --  11  < >  --   --  8   CONOR  --  7.7*  --  7.7* 7.7*  --   --   < >  --   --  9.0   GLC  --  158*  --  127* 177*  --   --   < >  --   --  132*   ALBUMIN  --   --   --   --   --   --   --   --   --   --  3.9    PROTTOTAL  --   --   --   --   --   --   --   --   --   --  7.3   BILITOTAL  --   --   --   --   --   --   --   --   --   --  1.1   ALKPHOS  --   --   --   --   --   --   --   --   --   --  87   ALT  --   --   --   --   --   --   --   --   --   --  19   AST  --   --   --   --   --   --   --   --   --   --  34   TROPI  --   --   --   --   --   --   --   --  17.825* 13.120* 2.420*   < > = values in this interval not displayed.    No results found for this or any previous visit (from the past 24 hour(s)).

## 2018-11-09 NOTE — PROGRESS NOTES
No new recommendations.   See my note from yesterday.   Will sign off please call if questions.     Peter Hickman MD  Infectious Disease

## 2018-11-09 NOTE — CONSULTS
[facility] evaluation referral received from Sander Wan.     Patient is not currently established in the [facility].     Nutrition consult noted. OR Recommend nutrition consult.    Hospital nurse, please give pt [facility]/HF education.     [facility] appointment made as below with Marjorie Monroy @ [address], [address], [place], [address]  Date Time Provider Department Center   11/10/2018 8:00 AM Adrian Mcginnis, OT [facility]   11/10/2018 1:30 PM Adrian Mcginnis, OT [facility]   11/20/2018 1:00 PM [facility]   11/20/2018 1:50 PM Marjorie Monroy PA-C [facility]   11/28/2018 2:00 PM [facility] CARDIOTHORACIC SURGERY, TAYLOR MARSH [facility] Owned   1/22/2019 1:10 PM [facility]     We look forward to seeing him in the clinic.   Please call with questions.     Lisy Armando RN  [facility] RN Care Coordinator  [facility]  [phone]    [facility]: Cardiomyopathy, Optimization, Rehabilitation, Education   The [facility] is a heart failure specialty clinic within the [facility] where you will work with your cardiologist, nurse practitioners, physician assistants and registered nurses who specialize in heart failure care. They are dedicated to helping patients with heart failure to carefully adjust medications, receive education, and learn who and when to call if symptoms develop. They specialize in helping you better understand your condition, slow the progression of your disease, improve the length and quality of your life, help you detect future heart problems before they become life threatening, and avoid hospitalizations.

## 2018-11-09 NOTE — PROGRESS NOTES
"CT surgery Progress Note    Severe 3 vessel coronary artery disease s/p CABG x 3 (LIMA to LAD, SVG to OM, SVG to PDA) POD #3    Subjective:  States that pain is being controlled. Denies any hallucinations. Breathing is ok. Working with IS/flutter valve. Appetite is good. +BM, denies any popping/clicking in his breast bone, was able to get some sleep, working with therapies    Objective:  Up in chair, comfortable, +O2  /62  Pulse 77  Temp 97.7  F (36.5  C) (Axillary)  Resp 16  Ht 1.803 m (5' 11\")  Wt 103.3 kg (227 lb 11.8 oz)  SpO2 (!) 80%  BMI 31.76 kg/m2  CT - decreased serous output, +leak (CT placed to water seal this am)  CV - RRR, telemetry SR 80s with freq PVCs, +edema LE L > R  Pulm - mildly coarse anteriorly, exp wheezes, IS 4876-1052 ml  Abd - BS+, soft  Derm - sternal incision D/I   L LE incisions D/I  Lab - WBC 11.4   Hgb/Hct 9.2/27.2   Plt 101   Na 146   K 4.7   Cr/BUN 0.99/32   Glucose   +kong no further hematuria    A/P:  1. S/p CABG x 3 POD #3  2. Hypertension - lopressor 25 mg bid, lisinopril 2.5 mg  3. CAP - rocephin/azithromycin, followed by 1 week of augmentin, per ID will need f/u with pulmonary  4. Hx of tobacco abuse - nicotine patch, needs to be aware of cough/sternal precautions, plans for cesstion  5. Steroids - prednisone taper  6 hyperlipidemia - crestor 40 mg  7. Air leak - monitor, chest tubes placed to water seal, CXR at 1200, if no pneumothorax will plan to remove CT  8. Carotid artery stenosis - needs CTA of carotids,, if stenosis > or = to 70% will consult vascular surgery for endarterectomy (likely in 6 weeks), will await test per cardiology recommendations, will plan to follow as outpatient  Encouraged IS/TCDB/amb. Sternal precautions. Will follow up on CXR. Looking at likely discharge to home on Sunday/Monday. Ok to remove kong. Continue to monitor.    Cheko Andrade PA-C  [phone]    Addendum: CXR reviewed. Tiny bilateral pneumo per report. Will recheck " in am. Leave chest tubes to water seal. Continue to monitor.

## 2018-11-09 NOTE — PROGRESS NOTES
"NUTRITION ASSESSMENT      REASON FOR ASSESSMENT:  Cardiac Surgery Nutrition Consult    CURRENT DIET / INTAKE:  Diet: Regular    Visited with pt this morning  Pt just ordered breakfast - omelet, fruit, blueberry muffin, lemonade  Pt reports fair appetite  Is agreeable to taking a chocolate Boost at 10am and 2pm    Pt follows a regular diet at home  Does not consume any nutrition supplements      ANTHROPOMETRICS:   Ht: 5'11\"  Wt: (11/4) 100.1 kg  BMI: 30.8  IBW: 78.2 kg  Weight Status: Obesity Grade I BMI 30-34.9  %IBW: 128%    MALNUTRITION:  Patient does not meet two of the following criteria necessary for diagnosing malnutrition:  significant weight loss, reduced intake, subcutaneous fat loss, muscle loss or fluid retention    NUTRITION DIAGNOSIS:   Increased nutrient needs (protein) R/t higher demand for healing AEB pt is s/p CABG    INTERVENTIONS:    Nutrition Prescription:  Regular    Implementation:  Nutrition Education (Content):  Discussed the importance of adequate nutrition post-op for healing and energy, emphasizing protein foods, and encouraged patient to order a protein food at each meal.    Will send a chocolate Boost at 10am and 2pm    Goals:  Patient to consume ~75% at meals in the next 3 - 5 days    Follow Up/Monitoring (InPatient):  Food and Fluid intake -  Monitor for adequacy    Follow Up/Monitoring (OutPatient):  Patient will participate in out-patient cardiac rehab and attend nutrition classes during the program    "

## 2018-11-09 NOTE — PLAN OF CARE
Problem: Patient Care Overview  Goal: Plan of Care/Patient Progress Review  Outcome: Improving  Patient arrived from ICU at 1600. Alert and oriented. Vitals signs stable. Tele: SR w/ PVC. 2-3L O2. LS wheezing. Nebs given. Good cough. Refusing nicotine patch. Denies pain. CT to suction. +AL, +Crepitus. Pacer wires capped. Chest incision and harvest site c/d/i. Steinberg patent, still bloody urine. +Gas. Up astx2.

## 2018-11-09 NOTE — PROGRESS NOTES
"SPIRITUAL HEALTH SERVICES Progress Note  [facility]    Pt expressed gratefulness for the miracle of his surgery, and of the good care he is receiving here.  Pt and wife said they appreciate all of the prayer \"that has carried us through all of this.\"  Pt said he would like communion (Latter-day) and during our [facility]  arrived with communion.       provided support and listening.    Pt has support in home community, and is grateful for the tender care he has received.      I will follow for duration of stay.    Stephanie Garnett  Chaplain Resident  "

## 2018-11-09 NOTE — PROGRESS NOTES
"PULMONOLOGY PROGRESS NOTE  Date of service: 2018  [facility]    CC/Reason for Visit: abnormal CT Chest.    SUBJECTIVE      HPI: Events reviewed since last seen. CABG x 3 done , patient extubated , transferred to the floor yesterday. Stable night. No new respiratory problems. Breathing comfortably at present.    ROS: A Problem Pertinent review of systems was negative except for items noted in HPI.    Past Medical, Family, and Social/Substance History has been reviewed: No interval changes.    OBJECTIVE   /59 (BP Location: Left arm)  Pulse 91  Temp 98.3  F (36.8  C) (Oral)  Resp 16  Ht 1.803 m (5' 11\")  Wt 103.3 kg (227 lb 11.8 oz)  SpO2 98%  BMI 31.76 kg/m2 2 L/min     Temp (24hrs), Av.2  F (36.8  C), Min:97.7  F (36.5  C), Max:98.7  F (37.1  C)       Intake/Output Summary (Last 24 hours) at 18 1011  Last data filed at 18 0915   Gross per 24 hour   Intake             1030 ml   Output             1405 ml   Net             -375 ml     Patient Vitals for the past 96 hrs:   Weight   18 0400 103.3 kg (227 lb 11.8 oz)   18 0645 102.6 kg (226 lb 3.1 oz)   18 0615 103.7 kg (228 lb 9.9 oz)   18 0400 102.3 kg (225 lb 8.5 oz)   18 1800 101.6 kg (223 lb 15.8 oz)       CONSTITUTIONAL/GENERAL APPEARANCE: Alert. No Apparent Distress.  PSYCHIATRIC: Pleasant and appropriate mood and affect. Oriented x 3.  EARS, NOSE,THROAT,MOUTH: External ears and nose overall normal. Normal oral mucosa.   NECK: Neck appearance normal. No neck masses and the thyroid is not enlarged.   RESPIRATORY: Non-labored effort. Decreased BS, sl coarse posteriorly.  CARDIOVASCULAR: S1, S2, regular rate and rhythm.      LABORATORY ASSESSMENT        Recent Labs  Lab 18  1000 18  1630   PH 7.33* 7.30*   PCO2 42 38   PO2 252* 131*   HCO3 22 18*       Recent Labs  Lab 18  0735 18  0345   WBC 11.4* 9.6   RBC 2.63* 2.35*   HGB 9.2* 8.2*   HCT 27.2* 24.5* "   * 104*   MCH 35.0* 34.9*   MCHC 33.8 33.5   RDW 12.9 13.1   * 75*       Recent Labs  Lab 11/09/18  0735 11/08/18  0345 11/07/18  1445 11/07/18  0404   * 146*  --  147*   POTASSIUM 4.7 4.8 4.4 4.0   CHLORIDE 115* 117*  --  118*   CO2 25 24  --  22   ANIONGAP 6 5  --  7   BUN 32* 30  --  31*   CR 0.99 0.95  --  1.27*   GFRESTIMATED 75 78  --  56*   GFRESTBLACK >90 >90  --  68   CONOR 8.4* 7.7*  --  7.7*     No lab results found in last 7 days.    Recent Labs  Lab 11/06/18  1304   INR 1.37*       Additional labs and/or comments: Fungal serologies negative.    IMAGING      CXR 11/7 -  IMPRESSION: Lungs are slightly hypoinflated with perihilar and basilar  opacities favored to represent atelectasis. Postoperative changes of  coronary artery bypass grafting noted. Heart size is unchanged. Tubes  and lines are unchanged.    CT Chest 11/4 -  IMPRESSION:  1. No evidence of aortic dissection.  2. Nodular bilateral airspace opacities surrounded by groundglass  opacity. There is associated mediastinal and left hilar adenopathy.  Radiographic findings suggestive of a fungal infection, although  multifocal bacterial pneumonia or metastatic disease can have a  similar appearance.  3. Nonobstructing bilateral nephrolithiasis.  4. Small hiatal hernia.    PFT & OTHER TESTING       ASSESSMENT / PLAN      Active Problems:    ACS (acute coronary syndrome) (H)    S/P CABG (coronary artery bypass graft)      ASSESSMENT: 67 yo male smoker (no prior hx lung disease) admitted with NSTEMI and new ischemic CM. Patient now POD #3 CABG x 3. CXR on admission clear. CT Chest showed nodular bilateral airspace opacities surrounded by groundglass opacity with associated mediastinal and left hilar adenopathy. DDx infectious and inflammatory processes (malignancy unlikely). Patient seen by ID, Sputum C&S and fungal serologies negative. Patient treated with empiric antibiotics, with plan for repeat CT Chest as an outpatient to  follow-up the pulmonary infiltrates. Respiratory status currently stable on low flow O2.    Diagnoses  Abnl CT/CXR  R91.8  Adenopathy  R59.9  Nicotine depend F17.210  SOB   R06.02    PLAN:  1. Wean off O2, keep SaO2 > 90%  2. Bronchodilators - DuoNebs.  3. Antibiotics - Rocephin.  4. Steroids - Prednisone taper.  5. Smoking cessation, Nicotine patch.  6. Increase activity and IS.  7. Recommend follow-up CT Chest in 6 weeks.  8. Outpatient Pulmonary follow-up to review CT Chest and check PFTs to assess for underlying COPD.    Updated wife at bedside.    No further suggestions at this time. Dr. Levy will see pt in follow-up on Monday. Please call if needed over the WE.      Henok Do MD    [facility]  [phone] (pager)  [phone] (office)

## 2018-11-10 ENCOUNTER — APPOINTMENT (OUTPATIENT)
Dept: OCCUPATIONAL THERAPY | Facility: CLINIC | Age: 68
DRG: 233 | End: 2018-11-10
Payer: COMMERCIAL

## 2018-11-10 ENCOUNTER — APPOINTMENT (OUTPATIENT)
Dept: GENERAL RADIOLOGY | Facility: CLINIC | Age: 68
DRG: 233 | End: 2018-11-10
Attending: PHYSICIAN ASSISTANT
Payer: COMMERCIAL

## 2018-11-10 LAB
ANION GAP SERPL CALCULATED.3IONS-SCNC: 4 MMOL/L (ref 3–14)
BACTERIA SPEC CULT: NO GROWTH
BACTERIA SPEC CULT: NO GROWTH
BUN SERPL-MCNC: 34 MG/DL (ref 7–30)
CALCIUM SERPL-MCNC: 8.6 MG/DL (ref 8.5–10.1)
CHLORIDE SERPL-SCNC: 113 MMOL/L (ref 94–109)
CO2 SERPL-SCNC: 27 MMOL/L (ref 20–32)
CREAT SERPL-MCNC: 0.93 MG/DL (ref 0.66–1.25)
ERYTHROCYTE [DISTWIDTH] IN BLOOD BY AUTOMATED COUNT: 12.9 % (ref 10–15)
GFR SERPL CREATININE-BSD FRML MDRD: 80 ML/MIN/1.7M2
GLUCOSE BLDC GLUCOMTR-MCNC: 108 MG/DL (ref 70–99)
GLUCOSE BLDC GLUCOMTR-MCNC: 116 MG/DL (ref 70–99)
GLUCOSE BLDC GLUCOMTR-MCNC: 171 MG/DL (ref 70–99)
GLUCOSE BLDC GLUCOMTR-MCNC: 187 MG/DL (ref 70–99)
GLUCOSE SERPL-MCNC: 122 MG/DL (ref 70–99)
HCT VFR BLD AUTO: 27.1 % (ref 40–53)
HGB BLD-MCNC: 9.1 G/DL (ref 13.3–17.7)
Lab: NORMAL
Lab: NORMAL
MCH RBC QN AUTO: 34.5 PG (ref 26.5–33)
MCHC RBC AUTO-ENTMCNC: 33.6 G/DL (ref 31.5–36.5)
MCV RBC AUTO: 103 FL (ref 78–100)
PLATELET # BLD AUTO: 125 10E9/L (ref 150–450)
POTASSIUM SERPL-SCNC: 4.3 MMOL/L (ref 3.4–5.3)
RBC # BLD AUTO: 2.64 10E12/L (ref 4.4–5.9)
SODIUM SERPL-SCNC: 144 MMOL/L (ref 133–144)
SPECIMEN SOURCE: NORMAL
SPECIMEN SOURCE: NORMAL
WBC # BLD AUTO: 10.4 10E9/L (ref 4–11)

## 2018-11-10 PROCEDURE — 25000125 ZZHC RX 250: Performed by: PHYSICIAN ASSISTANT

## 2018-11-10 PROCEDURE — 71046 X-RAY EXAM CHEST 2 VIEWS: CPT | Mod: 76

## 2018-11-10 PROCEDURE — 97110 THERAPEUTIC EXERCISES: CPT | Mod: GO

## 2018-11-10 PROCEDURE — 25000132 ZZH RX MED GY IP 250 OP 250 PS 637: Performed by: HOSPITALIST

## 2018-11-10 PROCEDURE — 25000132 ZZH RX MED GY IP 250 OP 250 PS 637: Performed by: PHYSICIAN ASSISTANT

## 2018-11-10 PROCEDURE — 36415 COLL VENOUS BLD VENIPUNCTURE: CPT | Performed by: INTERNAL MEDICINE

## 2018-11-10 PROCEDURE — 85027 COMPLETE CBC AUTOMATED: CPT | Performed by: INTERNAL MEDICINE

## 2018-11-10 PROCEDURE — 25000128 H RX IP 250 OP 636: Performed by: PHYSICIAN ASSISTANT

## 2018-11-10 PROCEDURE — 40000133 ZZH STATISTIC OT WARD VISIT

## 2018-11-10 PROCEDURE — 97535 SELF CARE MNGMENT TRAINING: CPT | Mod: GO

## 2018-11-10 PROCEDURE — 25000125 ZZHC RX 250: Performed by: SURGERY

## 2018-11-10 PROCEDURE — 25000132 ZZH RX MED GY IP 250 OP 250 PS 637

## 2018-11-10 PROCEDURE — 94640 AIRWAY INHALATION TREATMENT: CPT

## 2018-11-10 PROCEDURE — 12000007 ZZH R&B INTERMEDIATE

## 2018-11-10 PROCEDURE — 25000132 ZZH RX MED GY IP 250 OP 250 PS 637: Performed by: INTERNAL MEDICINE

## 2018-11-10 PROCEDURE — 99232 SBSQ HOSP IP/OBS MODERATE 35: CPT | Performed by: INTERNAL MEDICINE

## 2018-11-10 PROCEDURE — 00000146 ZZHCL STATISTIC GLUCOSE BY METER IP

## 2018-11-10 PROCEDURE — 40000275 ZZH STATISTIC RCP TIME EA 10 MIN

## 2018-11-10 PROCEDURE — 80048 BASIC METABOLIC PNL TOTAL CA: CPT | Performed by: INTERNAL MEDICINE

## 2018-11-10 PROCEDURE — 25000131 ZZH RX MED GY IP 250 OP 636 PS 637: Performed by: PHYSICIAN ASSISTANT

## 2018-11-10 PROCEDURE — 71046 X-RAY EXAM CHEST 2 VIEWS: CPT

## 2018-11-10 PROCEDURE — 94640 AIRWAY INHALATION TREATMENT: CPT | Mod: 76

## 2018-11-10 RX ORDER — FUROSEMIDE 10 MG/ML
20 INJECTION INTRAMUSCULAR; INTRAVENOUS ONCE
Status: COMPLETED | OUTPATIENT
Start: 2018-11-10 | End: 2018-11-10

## 2018-11-10 RX ORDER — POTASSIUM CHLORIDE 1500 MG/1
20 TABLET, EXTENDED RELEASE ORAL ONCE
Status: COMPLETED | OUTPATIENT
Start: 2018-11-10 | End: 2018-11-10

## 2018-11-10 RX ORDER — POTASSIUM CHLORIDE 750 MG/1
10 TABLET, EXTENDED RELEASE ORAL ONCE
Status: COMPLETED | OUTPATIENT
Start: 2018-11-10 | End: 2018-11-10

## 2018-11-10 RX ADMIN — INSULIN ASPART 1 UNITS: 100 INJECTION, SOLUTION INTRAVENOUS; SUBCUTANEOUS at 13:08

## 2018-11-10 RX ADMIN — CEFTRIAXONE 1 G: 1 INJECTION, POWDER, FOR SOLUTION INTRAMUSCULAR; INTRAVENOUS at 06:44

## 2018-11-10 RX ADMIN — FUROSEMIDE 20 MG: 10 INJECTION, SOLUTION INTRAVENOUS at 17:03

## 2018-11-10 RX ADMIN — ACETAMINOPHEN 975 MG: 325 TABLET, FILM COATED ORAL at 20:08

## 2018-11-10 RX ADMIN — LISINOPRIL 2.5 MG: 2.5 TABLET ORAL at 08:30

## 2018-11-10 RX ADMIN — Medication 25 MG: at 13:11

## 2018-11-10 RX ADMIN — FUROSEMIDE 20 MG: 10 INJECTION, SOLUTION INTRAVENOUS at 12:05

## 2018-11-10 RX ADMIN — IPRATROPIUM BROMIDE AND ALBUTEROL SULFATE 3 ML: 2.5; .5 SOLUTION RESPIRATORY (INHALATION) at 16:49

## 2018-11-10 RX ADMIN — IPRATROPIUM BROMIDE AND ALBUTEROL SULFATE 3 ML: 2.5; .5 SOLUTION RESPIRATORY (INHALATION) at 20:14

## 2018-11-10 RX ADMIN — LIDOCAINE 2 PATCH: 560 PATCH PERCUTANEOUS; TOPICAL; TRANSDERMAL at 08:51

## 2018-11-10 RX ADMIN — NICOTINE 1 PATCH: 14 PATCH, EXTENDED RELEASE TRANSDERMAL at 08:52

## 2018-11-10 RX ADMIN — PREDNISONE 30 MG: 5 TABLET ORAL at 08:29

## 2018-11-10 RX ADMIN — SENNOSIDES AND DOCUSATE SODIUM 1 TABLET: 8.6; 5 TABLET ORAL at 21:20

## 2018-11-10 RX ADMIN — SENNOSIDES AND DOCUSATE SODIUM 2 TABLET: 8.6; 5 TABLET ORAL at 08:30

## 2018-11-10 RX ADMIN — ACETAMINOPHEN 975 MG: 325 TABLET, FILM COATED ORAL at 12:05

## 2018-11-10 RX ADMIN — PANTOPRAZOLE SODIUM 40 MG: 40 TABLET, DELAYED RELEASE ORAL at 08:29

## 2018-11-10 RX ADMIN — IPRATROPIUM BROMIDE AND ALBUTEROL SULFATE 3 ML: 2.5; .5 SOLUTION RESPIRATORY (INHALATION) at 11:28

## 2018-11-10 RX ADMIN — METOPROLOL TARTRATE 25 MG: 25 TABLET ORAL at 08:29

## 2018-11-10 RX ADMIN — ACETAMINOPHEN 975 MG: 325 TABLET, FILM COATED ORAL at 04:57

## 2018-11-10 RX ADMIN — ASPIRIN 81 MG: 81 TABLET, COATED ORAL at 08:30

## 2018-11-10 RX ADMIN — POTASSIUM CHLORIDE 10 MEQ: 750 TABLET, EXTENDED RELEASE ORAL at 17:03

## 2018-11-10 RX ADMIN — ATORVASTATIN CALCIUM 40 MG: 40 TABLET, FILM COATED ORAL at 20:08

## 2018-11-10 RX ADMIN — LORATADINE 10 MG: 10 TABLET ORAL at 08:30

## 2018-11-10 RX ADMIN — POTASSIUM CHLORIDE 20 MEQ: 1500 TABLET, EXTENDED RELEASE ORAL at 12:05

## 2018-11-10 RX ADMIN — IPRATROPIUM BROMIDE AND ALBUTEROL SULFATE 3 ML: 2.5; .5 SOLUTION RESPIRATORY (INHALATION) at 07:26

## 2018-11-10 ASSESSMENT — ACTIVITIES OF DAILY LIVING (ADL)
ADLS_ACUITY_SCORE: 11

## 2018-11-10 NOTE — CONSULTS
Pt has no PCP listed in chart.  CTS RN met wt pt and wife.  Wife states pt has not seen an MD for at least 5 years.  Pt states he was seeing Dr Ramirez at hospitals and would like to continue seeing him.  PCP updated in chart.  Pt still has chest tubes and plan is to discharge to home wt wife's assist.

## 2018-11-10 NOTE — PLAN OF CARE
Problem: Patient Care Overview  Goal: Plan of Care/Patient Progress Review  Discharge Planner OT   Patient plan for discharge: Home  Current status: Pt went from supine to sit EOB with minimum assist. Pt went from sit<>Stand with SBA. Pt transferred to/from the toilet with SBA. Pt ambulated in the hallway for 6 minutes with SBA, pt required 3 standing rest breaks and demonstrated dyspnea. Pt on 1L oxygen during session, oxygen destats with activity, see vitals flowsheet for details.   Barriers to return to prior living situation: Current level of assist; stairs; respiratory demands   Recommendations for discharge: Home with assist for I/ADLs as needed and OP CR  Rationale for recommendations: pt will benefit from OPCR for progressive monitored exercise and further education in CAD management to maximize recovery and general cardiac health       Entered by: Adrian Mcginnis 11/10/2018 8:36 AM        PM session: Pt completed 20 stairs with Mod I. Pt completed the treadmill at a speed of 1.0 MPH for 7 minutes. Pt demonstrated SOB with activity.

## 2018-11-10 NOTE — PLAN OF CARE
Problem: Patient Care Overview  Goal: Plan of Care/Patient Progress Review  Outcome: Improving  A/O x4. AVSS on RA. Tele SD w/ PVCs. Up w/ SBA. Pt denies pain. CT to water seal,crepitus and AL present. Incisions, liquid bandage, WDL. Tolerating regular diet. Voiding adequately. Heart videos completed.

## 2018-11-10 NOTE — PROGRESS NOTES
[facility]    Hospitalist Progress Note    Interval History   - Chest tube clamped today, CXR stable. Patient walking around unit. Appetite is improving. Passing gas. Overall appears to be doing well.     Assessment & Plan   Summary: David Richard Schoenecker is a 68-year-old male with PMH of dyslipidemia, and tobacco dependence who was initially admitted 11/4 for NSTEMI,. Angiogram 11/5 which showed severe left main and three-vessel disease. He was transferred to the ICU with IABP and taken urgently for a three-vessel CABG 11/6. His hospital course has been complicated by suspected new diagnosis of COPD as well as community-acquired pneumonia with concern for fungal infection. Pulmonology and infectious disease have been consulted this hospital stay. Fungal cultures have returned negative and he is being treated for community-acquired pneumonia.  He has been on a prednisone taper. He was transferred out of the ICU 11/8, hospitalist service following for medical co- management       NSTEMI  CAD s/p three-vessel CABG 11/6 with LIMA to LAD, SVG to OM and PDA:  Extubated postoperative day 1. Required pressor support into postop day 2.  Transferred to medical floor by primary service 11/8  --Routine postoperative management per CV surgery   - Diuresis   - Chest tube clamped today  - Lungs: Mildly wheezy, continue COPD treatment  - Cardiac: no rub  - Volume status: mildly volume overloaded  - Infx: treating for CAP  - Anemia: stable Hgb 9.1  - Diet: Appetite improving  - BG: SSI  - Lines: chest tube clamped, kong removed  - Continue ASA 81 mg, atorvastatin 40 mg, metoprolol tartrate 25 mg twice daily and lisinopril 2.5 mg daily      Ischemic cardiomyopathy  LVEF 35-40% and WMA all consistent with significant NSTEMI  - Cardiology following   - Continue BB, ACEi      Community acquired pneumonia  Suspected undiagnosed COPD with acute exacerbation  Patient presented with ACS but also described several days  of flu symptoms with myalgias and cough. Was in [place] for three weeks in 9/2018, patient owns a condo there. CTA was notable for nodular bilateral airspace opacities with mediastinal and hilar adenopathy suggestive of fungal vs bacterial infection vs malignancy. Patient is a lifelong smoker. Noted wheezing this admission in the setting of dyspnea. Fungal studies subsequently returned negative. ID recommends treatment for CAP with IV azithromycin and ceftriaxone x7 days followed by 1 week Augmentin.  - Complete Ceftriaxone course 11/10, start Augmentin 11/11  - Received 60 mg prednisone 11/4 and was subsequently placed on 40 mg daily. Currently on oral taper (30x 3, 20 x 3, 10 x 3)  - Duonebs QID  - Encourage IS. Wean O2  - PFT as outpatient  - Repeat CT recommended in 6 weeks and follow up with Pulmonology     Dyslipidemia: FLP 11/05 with , HDL 36, , .  - Continue atorvastatin  Bilateral carotid artery disease, right > 70%: CTA deferred due to recent dye exposure. Per cards, CT can be obtained after discharge.  Pre-DM: A1C 5.9: Insulin drip following CABG, discontinued 11/8.  - Currently on sliding scale insulin and all BG < 160  - Continue SSI and monitor  Anemia/Acute thrombocytopenia: Due to CABG, monitor  Tobacco Dependence: 1 ppd smoker on admission. Counseled on cessation  - Nicotine patch       DVT Prophylaxis: Pneumatic Compression Devices  Code Status: Full Code  PT/OT: Ordered    Disposition: Expected discharge in ~2 days    Chacorta Garcia MD  Text Page  (7am to 6pm)  -Data reviewed today: I reviewed all new labs and imaging results over the last 24 hours.    Physical Exam   Temp: 97.8  F (36.6  C) Temp src: Oral BP: 107/61 Pulse: 75 Heart Rate: 80 Resp: 16 SpO2: 96 % O2 Device: None (Room air) Oxygen Delivery: 1 LPM  Vitals:    11/08/18 0645 11/09/18 0400 11/10/18 0500   Weight: 102.6 kg (226 lb 3.1 oz) 103.3 kg (227 lb 11.8 oz) 104.2 kg (229 lb 11.5 oz)     Vital Signs with  Ranges  Temp:  [97.8  F (36.6  C)-98.8  F (37.1  C)] 97.8  F (36.6  C)  Pulse:  [65-90] 75  Heart Rate:  [68-96] 80  Resp:  [14-16] 16  BP: ()/(51-68) 107/61  SpO2:  [86 %-99 %] 96 %  I/O last 3 completed shifts:  In: 450 [P.O.:450]  Out: 1570 [Urine:1500; Chest Tube:70]  O2 requirements: yes    Constitutional: Male in NAD  HEENT: Eyes nonicteric, oral mucosa moist  Cardiovascular: RRR, normal S1/2, no m/r/g  Respiratory: Scattered wheezing bilateral lobes, chest tube noted  Vascular: 1+ BLE pitting edema  GI: Normoactive bowel sounds, chest tube hardware noted  Skin/Integumen: No rashes  Neuro/Psych: Appropriate affect and mood. A&Ox3, moves all extremities    Medications     IV fluid REPLACEMENT ONLY         acetaminophen  975 mg Oral Q8H     aspirin  81 mg Oral Daily     atorvastatin  40 mg Oral QPM     furosemide  20 mg Intravenous Once     insulin aspart  1-7 Units Subcutaneous TID AC     insulin aspart  1-5 Units Subcutaneous At Bedtime     ipratropium - albuterol 0.5 mg/2.5 mg/3 mL  3 mL Nebulization 4x daily     lidocaine  2 patch Transdermal Q24H     lidocaine   Transdermal Q8H     lidocaine   Transdermal Q24h     lisinopril  2.5 mg Oral Daily     loratadine  10 mg Oral Daily     metoprolol succinate  25 mg Oral Daily     nicotine  1 patch Transdermal Daily     nicotine   Transdermal Q8H     nicotine   Transdermal Daily     pantoprazole  40 mg Oral QAM AC     potassium chloride  10 mEq Oral Once     predniSONE  30 mg Oral Daily    Followed by     [START ON 11/13/2018] predniSONE  20 mg Oral Daily    Followed by     [START ON 11/16/2018] predniSONE  10 mg Oral Daily     senna-docusate  1 tablet Oral BID    Or     senna-docusate  2 tablet Oral BID     sodium chloride (PF)  3 mL Intracatheter Q8H       Data     Recent Labs  Lab 11/10/18  0721 11/09/18  0735 11/08/18  0345  11/06/18  1304  11/06/18  1130  11/04/18  1128 11/04/18  0848 11/04/18  0338   WBC 10.4 11.4* 9.6  < > 27.8*  --  6.8  < >  --  14.5*  13.9*   HGB 9.1* 9.2* 8.2*  < > 10.9*  < > 8.8*  9.9*  < >  --  12.8* 14.6   * 103* 104*  < > 105*  --  105*  < >  --  101* 102*   * 101* 75*  < > 150  --  73*  < >  --  112* 129*   INR  --   --   --   --  1.37*  --  1.78*  --   --   --   --     146* 146*  < > 143  < > 144  142  < >  --   --  140   POTASSIUM 4.3 4.7 4.8  < > 4.8  < > 4.6  4.6  < >  --   --  4.0   CHLORIDE 113* 115* 117*  < > 113*  --  113*  < >  --   --  107   CO2 27 25 24  < > 20  --  20  < >  --   --  25   BUN 34* 32* 30  < > 35*  --   --   < >  --   --  19   CR 0.93 0.99 0.95  < > 1.36*  --   --   < >  --   --  1.03   ANIONGAP 4 6 5  < > 10  --  11  < >  --   --  8   CONOR 8.6 8.4* 7.7*  < > 7.7*  --   --   < >  --   --  9.0   * 135* 158*  < > 177*  --   --   < >  --   --  132*   ALBUMIN  --   --   --   --   --   --   --   --   --   --  3.9   PROTTOTAL  --   --   --   --   --   --   --   --   --   --  7.3   BILITOTAL  --   --   --   --   --   --   --   --   --   --  1.1   ALKPHOS  --   --   --   --   --   --   --   --   --   --  87   ALT  --   --   --   --   --   --   --   --   --   --  19   AST  --   --   --   --   --   --   --   --   --   --  34   TROPI  --   --   --   --   --   --   --   --  17.825* 13.120* 2.420*   < > = values in this interval not displayed.    Imaging:   Recent Results (from the past 24 hour(s))   XR Chest 2 Views    Narrative    CHEST TWO VIEWS   11/10/2018 8:44 AM     HISTORY: Evaluate for worsening pneumo, history of airleak, chest  tubes on water seal.     COMPARISON: 11/9/2018      Impression    IMPRESSION: Lung volumes are unchanged. Small left apical pneumothorax  is unchanged. No right pneumothorax is identified, less conspicuous  compared to the prior exam. Heart size is unchanged. Chest/mediastinal  tubes are unchanged.    RAFAEL GONSALEZ MD

## 2018-11-10 NOTE — PLAN OF CARE
Problem: Patient Care Overview  Goal: Plan of Care/Patient Progress Review  Outcome: No Change  A&O x4, VSS, tele SR w/PVC. CT to water seal in place, still with AL & crepitus. Slight GRANADOS, encourage IS+aceplla. Pain managed with prn tylenol. Denies CP. +flatus, no BM overnight. Will monitor.

## 2018-11-10 NOTE — PLAN OF CARE
Problem: Patient Care Overview  Goal: Plan of Care/Patient Progress Review  Outcome: Improving  VSS, denies pain. Up with assist x1. Poor appetite. Chest tube to water seal, has air leak and crepitus. Incisions C/D/I.

## 2018-11-10 NOTE — PROGRESS NOTES
"CT surgery Progress Note    Severe 3 vessel coronary artery disease s/p CABG x 3 (LIMA to LAD, SVG to OM, SVG to PDA) POD #4    Subjective:  States that pain is being controlled. Denies any hallucinations. Breathing is ok. Working with IS/flutter valve. Appetite is good. +BM, denies any popping/clicking in his breast bone, was able to get some sleep, working with therapies.      Objective:  Up in chair, comfortable, +O2  /61  Pulse 75  Temp 97.8  F (36.6  C) (Oral)  Resp 16  Ht 1.803 m (5' 11\")  Wt 104.2 kg (229 lb 11.5 oz)  SpO2 97%  BMI 32.04 kg/m2  CT - decreased serous output, + tidaling, clamped at 10 am  CV - RRR, telemetry SR 80s with freq PVCs, 1+ bilateral pitting edema LE L > R  Pulm - mildly coarse anteriorly, exp wheezes, IS 4445-2306 ml  Abd - BS+, soft   Derm - sternal incision D/I   L LE incisions D/I  Lab  BMP    Recent Labs  Lab 11/10/18  0721 11/09/18  0735 11/08/18  0345 11/07/18  1445 11/07/18  0404    146* 146*  --  147*   POTASSIUM 4.3 4.7 4.8 4.4 4.0   CHLORIDE 113* 115* 117*  --  118*   CONOR 8.6 8.4* 7.7*  --  7.7*   CO2 27 25 24  --  22   BUN 34* 32* 30  --  31*   CR 0.93 0.99 0.95  --  1.27*   * 135* 158*  --  127*     CBC    Recent Labs  Lab 11/10/18  0721 11/09/18  0735 11/08/18  0345 11/07/18  0404   WBC 10.4 11.4* 9.6 10.7   RBC 2.64* 2.63* 2.35* 2.53*   HGB 9.1* 9.2* 8.2* 8.9*   HCT 27.1* 27.2* 24.5* 26.3*   * 103* 104* 104*   MCH 34.5* 35.0* 34.9* 35.2*   MCHC 33.6 33.8 33.5 33.8   RDW 12.9 12.9 13.1 13.1   * 101* 75* 87*     INR    Recent Labs  Lab 11/06/18  1304 11/06/18  1130   INR 1.37* 1.78*      Hepatic Panel     Recent Labs  Lab 11/04/18  0338   AST 34   ALT 19   ALKPHOS 87   BILITOTAL 1.1   ALBUMIN 3.9     Glucose  Recent Labs  Lab 11/10/18  1124 11/10/18  0756 11/10/18  0721 11/09/18  2051 11/09/18  1724 11/09/18  1148 11/09/18  0840 11/09/18  0735  11/08/18  0345  11/07/18  0404  11/06/18  1304  11/06/18  0300   GLC  --   --  122*  --   " --   --   --  135*  --  158*  --  127*  --  177*  --  105*   * 108*  --  173* 218* 141* 98  --   < >  --   < >  --   < >  --   < >  --    < > = values in this interval not displayed.      A/P:  1. S/p CABG x 3 POD #4  2. Hypertension - Toprol 25 mg daily starting 11/11, lisinopril 2.5 mg  3. Heart failure with reduced EF - pre-op EF 30-35%, Toprol XL and lisinopril. Hypervolemic - will start IV lasix today, will plan on discharge with PO lasix  3. CAP - rocephin/azithromycin, followed by 1 week of augmentin, per ID will need f/u with pulmonary  4. Hx of tobacco abuse - nicotine patch, needs to be aware of cough/sternal precautions, plans for cesstion  5. Steroids - prednisone taper  6 hyperlipidemia - crestor 40 mg  7. Air leak - monitor, clamping trial today per Dr. Peres given tidaling on waterseal, CXR this PM, if stable Pneumo will remove first thing 11/11  8. Carotid artery stenosis - needs CTA of carotids, if stenosis > or = to 70% will consult vascular surgery for endarterectomy (likely in 6 weeks), will await test per cardiology recommendations, will plan to follow as outpatient    Encouraged IS/TCDB/amb. Sternal precautions. CXR stable today, but tidaling, clamping trial this PM, will remove chest tube 11/11 AM if CXR stable per Dr. Peres. Looking at likely discharge to home on Monday. Ok to remove kong. Continue to monitor.      Juan Garcia PA-C  Cardiothoracic Surgery  November 10, 2018 7:57 AM   p: [phone]

## 2018-11-11 ENCOUNTER — APPOINTMENT (OUTPATIENT)
Dept: OCCUPATIONAL THERAPY | Facility: CLINIC | Age: 68
DRG: 233 | End: 2018-11-11
Payer: COMMERCIAL

## 2018-11-11 ENCOUNTER — APPOINTMENT (OUTPATIENT)
Dept: GENERAL RADIOLOGY | Facility: CLINIC | Age: 68
DRG: 233 | End: 2018-11-11
Attending: PHYSICIAN ASSISTANT
Payer: COMMERCIAL

## 2018-11-11 LAB
ANION GAP SERPL CALCULATED.3IONS-SCNC: 6 MMOL/L (ref 3–14)
BUN SERPL-MCNC: 33 MG/DL (ref 7–30)
CALCIUM SERPL-MCNC: 8.7 MG/DL (ref 8.5–10.1)
CHLORIDE SERPL-SCNC: 113 MMOL/L (ref 94–109)
CO2 SERPL-SCNC: 29 MMOL/L (ref 20–32)
CREAT SERPL-MCNC: 1.05 MG/DL (ref 0.66–1.25)
ERYTHROCYTE [DISTWIDTH] IN BLOOD BY AUTOMATED COUNT: 12.9 % (ref 10–15)
GFR SERPL CREATININE-BSD FRML MDRD: 70 ML/MIN/1.7M2
GLUCOSE BLDC GLUCOMTR-MCNC: 102 MG/DL (ref 70–99)
GLUCOSE BLDC GLUCOMTR-MCNC: 129 MG/DL (ref 70–99)
GLUCOSE BLDC GLUCOMTR-MCNC: 130 MG/DL (ref 70–99)
GLUCOSE BLDC GLUCOMTR-MCNC: 142 MG/DL (ref 70–99)
GLUCOSE BLDC GLUCOMTR-MCNC: 156 MG/DL (ref 70–99)
GLUCOSE SERPL-MCNC: 101 MG/DL (ref 70–99)
HCT VFR BLD AUTO: 28.3 % (ref 40–53)
HGB BLD-MCNC: 9.3 G/DL (ref 13.3–17.7)
MCH RBC QN AUTO: 33.7 PG (ref 26.5–33)
MCHC RBC AUTO-ENTMCNC: 32.9 G/DL (ref 31.5–36.5)
MCV RBC AUTO: 103 FL (ref 78–100)
PLATELET # BLD AUTO: 160 10E9/L (ref 150–450)
POTASSIUM SERPL-SCNC: 3.9 MMOL/L (ref 3.4–5.3)
RBC # BLD AUTO: 2.76 10E12/L (ref 4.4–5.9)
SODIUM SERPL-SCNC: 148 MMOL/L (ref 133–144)
WBC # BLD AUTO: 8.5 10E9/L (ref 4–11)

## 2018-11-11 PROCEDURE — 25000132 ZZH RX MED GY IP 250 OP 250 PS 637

## 2018-11-11 PROCEDURE — 25000132 ZZH RX MED GY IP 250 OP 250 PS 637: Performed by: PHYSICIAN ASSISTANT

## 2018-11-11 PROCEDURE — 36415 COLL VENOUS BLD VENIPUNCTURE: CPT | Performed by: PHYSICIAN ASSISTANT

## 2018-11-11 PROCEDURE — 25000125 ZZHC RX 250: Performed by: SURGERY

## 2018-11-11 PROCEDURE — 85027 COMPLETE CBC AUTOMATED: CPT | Performed by: PHYSICIAN ASSISTANT

## 2018-11-11 PROCEDURE — 25000132 ZZH RX MED GY IP 250 OP 250 PS 637: Performed by: INTERNAL MEDICINE

## 2018-11-11 PROCEDURE — 25000132 ZZH RX MED GY IP 250 OP 250 PS 637: Performed by: HOSPITALIST

## 2018-11-11 PROCEDURE — 40000275 ZZH STATISTIC RCP TIME EA 10 MIN

## 2018-11-11 PROCEDURE — 12000007 ZZH R&B INTERMEDIATE

## 2018-11-11 PROCEDURE — 71045 X-RAY EXAM CHEST 1 VIEW: CPT

## 2018-11-11 PROCEDURE — 25000125 ZZHC RX 250: Performed by: PHYSICIAN ASSISTANT

## 2018-11-11 PROCEDURE — 99232 SBSQ HOSP IP/OBS MODERATE 35: CPT | Performed by: INTERNAL MEDICINE

## 2018-11-11 PROCEDURE — 25000128 H RX IP 250 OP 636: Performed by: PHYSICIAN ASSISTANT

## 2018-11-11 PROCEDURE — 40000133 ZZH STATISTIC OT WARD VISIT

## 2018-11-11 PROCEDURE — 80048 BASIC METABOLIC PNL TOTAL CA: CPT | Performed by: PHYSICIAN ASSISTANT

## 2018-11-11 PROCEDURE — 00000146 ZZHCL STATISTIC GLUCOSE BY METER IP

## 2018-11-11 PROCEDURE — 94640 AIRWAY INHALATION TREATMENT: CPT | Mod: 76

## 2018-11-11 PROCEDURE — 97110 THERAPEUTIC EXERCISES: CPT | Mod: GO

## 2018-11-11 PROCEDURE — 94640 AIRWAY INHALATION TREATMENT: CPT

## 2018-11-11 RX ORDER — POLYETHYLENE GLYCOL 3350 17 G/17G
17 POWDER, FOR SOLUTION ORAL DAILY
Status: DISCONTINUED | OUTPATIENT
Start: 2018-11-11 | End: 2018-11-12 | Stop reason: HOSPADM

## 2018-11-11 RX ORDER — FUROSEMIDE 20 MG
20 TABLET ORAL
Status: DISCONTINUED | OUTPATIENT
Start: 2018-11-11 | End: 2018-11-11

## 2018-11-11 RX ORDER — POTASSIUM CHLORIDE 1500 MG/1
20 TABLET, EXTENDED RELEASE ORAL DAILY
Status: DISCONTINUED | OUTPATIENT
Start: 2018-11-11 | End: 2018-11-12 | Stop reason: HOSPADM

## 2018-11-11 RX ORDER — FUROSEMIDE 10 MG/ML
20 INJECTION INTRAMUSCULAR; INTRAVENOUS ONCE
Status: COMPLETED | OUTPATIENT
Start: 2018-11-11 | End: 2018-11-11

## 2018-11-11 RX ORDER — FUROSEMIDE 20 MG
20 TABLET ORAL
Status: DISCONTINUED | OUTPATIENT
Start: 2018-11-11 | End: 2018-11-12 | Stop reason: HOSPADM

## 2018-11-11 RX ADMIN — AMOXICILLIN AND CLAVULANATE POTASSIUM 1 TABLET: 875; 125 TABLET, FILM COATED ORAL at 19:51

## 2018-11-11 RX ADMIN — FUROSEMIDE 20 MG: 20 TABLET ORAL at 17:09

## 2018-11-11 RX ADMIN — IPRATROPIUM BROMIDE AND ALBUTEROL SULFATE 3 ML: 2.5; .5 SOLUTION RESPIRATORY (INHALATION) at 15:26

## 2018-11-11 RX ADMIN — FUROSEMIDE 20 MG: 10 INJECTION, SOLUTION INTRAVENOUS at 08:19

## 2018-11-11 RX ADMIN — NICOTINE 1 PATCH: 14 PATCH, EXTENDED RELEASE TRANSDERMAL at 08:12

## 2018-11-11 RX ADMIN — ACETAMINOPHEN 975 MG: 325 TABLET, FILM COATED ORAL at 12:21

## 2018-11-11 RX ADMIN — PANTOPRAZOLE SODIUM 40 MG: 40 TABLET, DELAYED RELEASE ORAL at 06:13

## 2018-11-11 RX ADMIN — POLYETHYLENE GLYCOL 3350 17 G: 17 POWDER, FOR SOLUTION ORAL at 10:11

## 2018-11-11 RX ADMIN — Medication 25 MG: at 08:13

## 2018-11-11 RX ADMIN — PREDNISONE 30 MG: 5 TABLET ORAL at 08:13

## 2018-11-11 RX ADMIN — ASPIRIN 81 MG: 81 TABLET, COATED ORAL at 08:13

## 2018-11-11 RX ADMIN — IPRATROPIUM BROMIDE AND ALBUTEROL SULFATE 3 ML: 2.5; .5 SOLUTION RESPIRATORY (INHALATION) at 07:40

## 2018-11-11 RX ADMIN — ACETAMINOPHEN 975 MG: 325 TABLET, FILM COATED ORAL at 19:51

## 2018-11-11 RX ADMIN — AMOXICILLIN AND CLAVULANATE POTASSIUM 1 TABLET: 875; 125 TABLET, FILM COATED ORAL at 08:13

## 2018-11-11 RX ADMIN — ATORVASTATIN CALCIUM 40 MG: 40 TABLET, FILM COATED ORAL at 19:51

## 2018-11-11 RX ADMIN — POTASSIUM CHLORIDE 20 MEQ: 1500 TABLET, EXTENDED RELEASE ORAL at 08:19

## 2018-11-11 RX ADMIN — LISINOPRIL 2.5 MG: 2.5 TABLET ORAL at 08:13

## 2018-11-11 RX ADMIN — INSULIN ASPART 1 UNITS: 100 INJECTION, SOLUTION INTRAVENOUS; SUBCUTANEOUS at 17:07

## 2018-11-11 RX ADMIN — SENNOSIDES AND DOCUSATE SODIUM 2 TABLET: 8.6; 5 TABLET ORAL at 08:13

## 2018-11-11 RX ADMIN — LORATADINE 10 MG: 10 TABLET ORAL at 08:13

## 2018-11-11 RX ADMIN — INSULIN ASPART 1 UNITS: 100 INJECTION, SOLUTION INTRAVENOUS; SUBCUTANEOUS at 12:21

## 2018-11-11 RX ADMIN — POTASSIUM CHLORIDE 20 MEQ: 1500 TABLET, EXTENDED RELEASE ORAL at 10:11

## 2018-11-11 RX ADMIN — LIDOCAINE 2 PATCH: 560 PATCH PERCUTANEOUS; TOPICAL; TRANSDERMAL at 08:13

## 2018-11-11 ASSESSMENT — ACTIVITIES OF DAILY LIVING (ADL)
ADLS_ACUITY_SCORE: 12
ADLS_ACUITY_SCORE: 11

## 2018-11-11 NOTE — PROGRESS NOTES
"CT surgery Progress Note    Severe 3 vessel coronary artery disease s/p CABG x 3 (LIMA to LAD, SVG to OM, SVG to PDA) POD #5    Subjective:  States that pain is being controlled. Denies any hallucinations. Breathing is ok. Denies SOB, CP, fevers, chills, nausea. Working with IS/flutter valve. Appetite is good. No BM for two days, +flatus, denies any popping/clicking in his breast bone, was able to get some sleep, working with therapies.      Objective:  Up in chair, comfortable, +O2  /57  Pulse 81  Temp 98.5  F (36.9  C) (Oral)  Resp 18  Ht 1.803 m (5' 11\")  Wt 103 kg (227 lb 1.2 oz)  SpO2 95%  BMI 31.67 kg/m2  CT - decreased serous output, + tidaling, clamped at 10 am  CV - RRR, telemetry SR 80s with freq PVCs, 1+ bilateral pitting edema LE L > R, improving  Pulm - mildly coarse anteriorly, exp wheezes, IS 7878-9008 ml  Abd - BS+, soft   Derm - sternal incision D/I   L LE incisions D/I  Lab  BMP    Recent Labs  Lab 11/11/18  0704 11/10/18  0721 11/09/18  0735 11/08/18  0345   * 144 146* 146*   POTASSIUM 3.9 4.3 4.7 4.8   CHLORIDE 113* 113* 115* 117*   CONOR 8.7 8.6 8.4* 7.7*   CO2 29 27 25 24   BUN 33* 34* 32* 30   CR 1.05 0.93 0.99 0.95   * 122* 135* 158*     CBC    Recent Labs  Lab 11/11/18  0704 11/10/18  0721 11/09/18  0735 11/08/18  0345   WBC 8.5 10.4 11.4* 9.6   RBC 2.76* 2.64* 2.63* 2.35*   HGB 9.3* 9.1* 9.2* 8.2*   HCT 28.3* 27.1* 27.2* 24.5*   * 103* 103* 104*   MCH 33.7* 34.5* 35.0* 34.9*   MCHC 32.9 33.6 33.8 33.5   RDW 12.9 12.9 12.9 13.1    125* 101* 75*     INR    Recent Labs  Lab 11/06/18  1304 11/06/18  1130   INR 1.37* 1.78*      Hepatic Panel   No lab results found in last 7 days.  Glucose  Recent Labs  Lab 11/11/18  0721 11/11/18  0704 11/11/18  0203 11/10/18  2124 11/10/18  1645 11/10/18  1124 11/10/18  0756 11/10/18  0721  11/09/18  0735  11/08/18  0345  11/07/18  0404  11/06/18  1304   GLC  --  101*  --   --   --   --   --  122*  --  135*  --  158*  --  " 127*  --  177*   *  --  129* 187* 116* 171* 108*  --   < >  --   < >  --   < >  --   < >  --    < > = values in this interval not displayed.      A/P:  1. S/p CABG x 3 POD #5  2. Hypertension - Toprol 25 mg daily starting 11/11, lisinopril 2.5 mg  3. Heart failure with reduced EF - pre-op EF 30-35%, Toprol XL and lisinopril. Hypervolemic - 20 mg IV lasix x1 this am and transition to PO this afternoon  3. CAP - rocephin/azithromycin, followed by 1 week of augmentin, per ID will need f/u with pulmonary  4. Hx of tobacco abuse - nicotine patch, needs to be aware of cough/sternal precautions, plans for cesstion  5. Steroids - prednisone taper, likely cause of hypernatremia   6 hyperlipidemia - crestor 40 mg  7. Air leak - monitor, passed clamping trial, CXR without pneumo while clamped, removed 11/11, f/u CXR this afternoon.  8. Carotid artery stenosis - needs CTA of carotids, if stenosis > or = to 70% will consult vascular surgery for endarterectomy (likely in 6 weeks), will await test per cardiology recommendations, will plan to follow as outpatient    Encouraged IS/TCDB/amb. Sternal precautions. CXR stable with clamping trial, remove chest tube today, f/u CXR this PM. Looking at likely discharge to home on Monday. Ok to remove kong. Continue to monitor.      Juan Garcia PA-C  Cardiothoracic Surgery  November 11, 2018 8:02 AM   p: [phone]

## 2018-11-11 NOTE — PLAN OF CARE
Problem: Patient Care Overview  Goal: Plan of Care/Patient Progress Review  Outcome: Improving  A/O x4. AVSS on RA. Tele SD w/ PVCs. Up independently in room. Pt denies pain. CT removed. Incisions, liquid bandage, WDL. BLE +2, compressions stockings in place. Tolerating regular diet. Voiding adequately. Plan to discharge home tomorrow.

## 2018-11-11 NOTE — PLAN OF CARE
Problem: Patient Care Overview  Goal: Plan of Care/Patient Progress Review  Discharge Planner PT   Patient plan for discharge: home tomorrow  Current status: Educated on role of OP CR, importance of exercise, sternal precautions, and HEP walking. Pt receptive to education. Treadmill at 1-1.3 MPH x 20 minutes. Tolerates well. Stable and normal CV response.   Barriers to return to prior living situation: none anticipated   Recommendations for discharge: home with OP CR  Rationale for recommendations: Pt will benefit from OP CR for progressive monitored physical activity for overall cardiac health.          Entered by: Gaviota Franco 11/11/2018 10:02 AM       PM: treadmill at 1.5 MPH x 20 minutes. Tolerates well. Stable CV response.

## 2018-11-11 NOTE — PROGRESS NOTES
[facility]    Hospitalist Progress Note    Interval History   - Pain controlled, light cough, eating well, no new acute issues    Assessment & Plan   Summary: David Richard Schoenecker is a 68-year-old male with PMH of dyslipidemia, and tobacco dependence who was initially admitted 11/4 for NSTEMI,. Angiogram 11/5 which showed severe left main and three-vessel disease. He was transferred to the ICU with IABP and taken urgently for a three-vessel CABG 11/6. His hospital course has been complicated by suspected new diagnosis of COPD as well as community-acquired pneumonia with concern for fungal infection. Pulmonology and infectious disease have been consulted this hospital stay. Fungal cultures have returned negative and he is being treated for community-acquired pneumonia.  He has been on a prednisone taper. He was transferred out of the ICU 11/8, hospitalist service following for medical co- management       NSTEMI  CAD s/p three-vessel CABG 11/6 with LIMA to LAD, SVG to OM and PDA:  Extubated postoperative day 1. Required pressor support into postop day 2.  Transferred to medical floor by primary service 11/8.  --Routine postoperative management per CV surgery   - Diuresis   - Chest tube plan removal today  - Lungs: Less wheezy today, continue COPD treatment  - Cardiac: mild rub  - Volume status: mildly volume overloaded  - Infx: treating for CAP  - Anemia: stable Hgb 9.1  - Diet: Appetite good  - BG: SSI  - Lines: Removing chest tube today  - Continue ASA 81 mg, atorvastatin 40 mg, metoprolol tartrate 25 mg twice daily and lisinopril 2.5 mg daily      Ischemic cardiomyopathy  LVEF 35-40% and WMA all consistent with significant NSTEMI  - Cardiology following   - Continue BB, ACEi      Community acquired pneumonia  Suspected undiagnosed COPD with acute exacerbation  Patient presented with ACS but also described several days of flu symptoms with myalgias and cough. Was in [place] for three weeks  in 9/2018, patient owns a condo there. CTA was notable for nodular bilateral airspace opacities with mediastinal and hilar adenopathy suggestive of fungal vs bacterial infection vs malignancy. Patient is a lifelong smoker. Noted wheezing this admission in the setting of dyspnea. Fungal studies subsequently returned negative. ID recommends treatment for CAP with IV azithromycin and ceftriaxone x7 days followed by 1 week Augmentin.  - Complete Ceftriaxone course 11/10, start Augmentin 11/11  - Received 60 mg prednisone 11/4 and was subsequently placed on 40 mg daily. Currently on oral taper five days each due to persistent wheezing (30x 5, 20 x 5, 10 x 5)  - Duonebs QID  - Encourage IS. Wean O2  - PFTs as outpatient  - Repeat CT chest recommended in 6 weeks and follow up with Pulmonology     Dyslipidemia: FLP 11/05 with , HDL 36, , .  - Continue atorvastatin  Bilateral carotid artery disease, right > 70%: CTA deferred due to recent dye exposure. Per cards, CT can be obtained after discharge.  Pre-DM: A1C 5.9: Insulin drip following CABG, discontinued 11/8.  - Currently on sliding scale insulin and all BG < 160  - Continue SSI and monitor  Anemia/Acute thrombocytopenia: Due to CABG, monitor  Tobacco Dependence: 1 ppd smoker on admission. Counseled on cessation  - Nicotine patch       DVT Prophylaxis: Pneumatic Compression Devices  Code Status: Full Code  PT/OT: Ordered    Disposition: Expected discharge tomorrow 11/12 to home    Chacorta Garcia MD  Text Page  (7am to 6pm)  -Data reviewed today: I reviewed all new labs and imaging results over the last 24 hours.    Physical Exam   Temp: 98.5  F (36.9  C) Temp src: Oral BP: 103/64 Pulse: 81 Heart Rate: 88 Resp: 18 SpO2: 95 % O2 Device: None (Room air) Oxygen Delivery: 1 LPM  Vitals:    11/09/18 0400 11/10/18 0500 11/11/18 0642   Weight: 103.3 kg (227 lb 11.8 oz) 104.2 kg (229 lb 11.5 oz) 103 kg (227 lb 1.2 oz)     Vital Signs with Ranges  Temp:   [97.8  F (36.6  C)-98.5  F (36.9  C)] 98.5  F (36.9  C)  Pulse:  [62-81] 81  Heart Rate:  [60-94] 88  Resp:  [16-20] 18  BP: (102-121)/(53-67) 103/64  SpO2:  [89 %-97 %] 95 %  I/O last 3 completed shifts:  In: 1210 [P.O.:1110; I.V.:100]  Out: 1900 [Urine:1810; Chest Tube:90]  O2 requirements: yes    Constitutional: Male in NAD  HEENT: Eyes nonicteric, oral mucosa moist  Cardiovascular: RRR, normal S1/2, no m/r/g  Respiratory: Minimal wheezing, no rales  Vascular: 1+ BLE pitting edema  GI: Normoactive bowel sounds, chest tube hardware noted  Skin/Integumen: No rashes  Neuro/Psych: Appropriate affect and mood. A&Ox3, moves all extremities    Medications     IV fluid REPLACEMENT ONLY         acetaminophen  975 mg Oral Q8H     amoxicillin-clavulanate  1 tablet Oral Q12H YOSSI     aspirin  81 mg Oral Daily     atorvastatin  40 mg Oral QPM     furosemide  20 mg Oral BID     insulin aspart  1-7 Units Subcutaneous TID AC     insulin aspart  1-5 Units Subcutaneous At Bedtime     ipratropium - albuterol 0.5 mg/2.5 mg/3 mL  3 mL Nebulization 4x daily     lidocaine  2 patch Transdermal Q24H     lidocaine   Transdermal Q8H     lidocaine   Transdermal Q24h     lisinopril  2.5 mg Oral Daily     loratadine  10 mg Oral Daily     metoprolol succinate  25 mg Oral Daily     nicotine  1 patch Transdermal Daily     nicotine   Transdermal Q8H     nicotine   Transdermal Daily     pantoprazole  40 mg Oral QAM AC     polyethylene glycol  17 g Oral Daily     potassium chloride  20 mEq Oral Daily     predniSONE  30 mg Oral Daily    Followed by     [START ON 11/13/2018] predniSONE  20 mg Oral Daily    Followed by     [START ON 11/16/2018] predniSONE  10 mg Oral Daily     senna-docusate  1 tablet Oral BID    Or     senna-docusate  2 tablet Oral BID     sodium chloride (PF)  3 mL Intracatheter Q8H       Data     Recent Labs  Lab 11/11/18  0704 11/10/18  0721 11/09/18  0735  11/06/18  1304  11/06/18  1130  11/04/18  1128   WBC 8.5 10.4 11.4*  < >  27.8*  --  6.8  < >  --    HGB 9.3* 9.1* 9.2*  < > 10.9*  < > 8.8*  9.9*  < >  --    * 103* 103*  < > 105*  --  105*  < >  --     125* 101*  < > 150  --  73*  < >  --    INR  --   --   --   --  1.37*  --  1.78*  --   --    * 144 146*  < > 143  < > 144  142  < >  --    POTASSIUM 3.9 4.3 4.7  < > 4.8  < > 4.6  4.6  < >  --    CHLORIDE 113* 113* 115*  < > 113*  --  113*  < >  --    CO2 29 27 25  < > 20  --  20  < >  --    BUN 33* 34* 32*  < > 35*  --   --   < >  --    CR 1.05 0.93 0.99  < > 1.36*  --   --   < >  --    ANIONGAP 6 4 6  < > 10  --  11  < >  --    CONOR 8.7 8.6 8.4*  < > 7.7*  --   --   < >  --    * 122* 135*  < > 177*  --   --   < >  --    TROPI  --   --   --   --   --   --   --   --  17.825*   < > = values in this interval not displayed.    Imaging:   Recent Results (from the past 24 hour(s))   XR Chest 2 Views    Narrative    CHEST TWO VIEWS   11/10/2018 3:15 PM    HISTORY: Clamping trial, +tidaling on waterseal, evaluate for  worsening pneumothorax.    COMPARISON: A chest radiograph earlier today at 8:42 AM.      Impression    IMPRESSION: Again seen are mediastinal and left-sided chest tubes. A  previously seen tiny left apical pneumothorax is no longer seen. Small  bilateral pleural effusions persist. No other change or significant  abnormality is seen.      HAYDEN SLATER MD

## 2018-11-11 NOTE — PLAN OF CARE
Problem: Patient Care Overview  Goal: Plan of Care/Patient Progress Review  Outcome: No Change  Vital Signs stable, pt on room air. Telemetry sinus rhythm. Alert and Oriented. Lung sounds diminished and fine crackles in the bases, expiratory wheezes present towards the end of shift in the upper lobes, IS to 2500. Bowel sounds active and audible. Passing flatus. Regular diet. Denies nausea. Adequate urinary output via urinal at bedside. CMS intact, bilateral lower extremity edema +2 in legs and ankles, +3 in feet. Sternal incision is clean dry and intact with liquid bandage closure, ecchymosis surrounds site. [place] site is clean dry and intact with guaze covering, had small amount of drainage. CT to water seal with intermittent air leak, no crepitus noted. Pacer wires capped and taped. Up with stand by assist. Pt has denied pain. Slept well in between cares during the night.

## 2018-11-12 ENCOUNTER — APPOINTMENT (OUTPATIENT)
Dept: OCCUPATIONAL THERAPY | Facility: CLINIC | Age: 68
DRG: 233 | End: 2018-11-12
Payer: COMMERCIAL

## 2018-11-12 VITALS
HEIGHT: 71 IN | OXYGEN SATURATION: 94 % | TEMPERATURE: 97.9 F | SYSTOLIC BLOOD PRESSURE: 96 MMHG | RESPIRATION RATE: 20 BRPM | WEIGHT: 221.34 LBS | DIASTOLIC BLOOD PRESSURE: 64 MMHG | HEART RATE: 73 BPM | BODY MASS INDEX: 30.99 KG/M2

## 2018-11-12 DIAGNOSIS — I65.23 BILATERAL CAROTID ARTERY OCCLUSION: Primary | ICD-10-CM

## 2018-11-12 LAB
ANION GAP SERPL CALCULATED.3IONS-SCNC: 5 MMOL/L (ref 3–14)
BUN SERPL-MCNC: 27 MG/DL (ref 7–30)
CALCIUM SERPL-MCNC: 8.7 MG/DL (ref 8.5–10.1)
CHLORIDE SERPL-SCNC: 109 MMOL/L (ref 94–109)
CO2 SERPL-SCNC: 30 MMOL/L (ref 20–32)
CREAT SERPL-MCNC: 0.94 MG/DL (ref 0.66–1.25)
GFR SERPL CREATININE-BSD FRML MDRD: 79 ML/MIN/1.7M2
GLUCOSE BLDC GLUCOMTR-MCNC: 113 MG/DL (ref 70–99)
GLUCOSE SERPL-MCNC: 99 MG/DL (ref 70–99)
POTASSIUM SERPL-SCNC: 3.9 MMOL/L (ref 3.4–5.3)
SODIUM SERPL-SCNC: 144 MMOL/L (ref 133–144)

## 2018-11-12 PROCEDURE — 40000275 ZZH STATISTIC RCP TIME EA 10 MIN

## 2018-11-12 PROCEDURE — 25000132 ZZH RX MED GY IP 250 OP 250 PS 637: Performed by: PHYSICIAN ASSISTANT

## 2018-11-12 PROCEDURE — 25000132 ZZH RX MED GY IP 250 OP 250 PS 637

## 2018-11-12 PROCEDURE — 25000125 ZZHC RX 250: Performed by: SURGERY

## 2018-11-12 PROCEDURE — 97530 THERAPEUTIC ACTIVITIES: CPT | Mod: GO

## 2018-11-12 PROCEDURE — 00000146 ZZHCL STATISTIC GLUCOSE BY METER IP

## 2018-11-12 PROCEDURE — 99232 SBSQ HOSP IP/OBS MODERATE 35: CPT | Performed by: HOSPITALIST

## 2018-11-12 PROCEDURE — 94640 AIRWAY INHALATION TREATMENT: CPT

## 2018-11-12 PROCEDURE — 80048 BASIC METABOLIC PNL TOTAL CA: CPT | Performed by: PHYSICIAN ASSISTANT

## 2018-11-12 PROCEDURE — 40000133 ZZH STATISTIC OT WARD VISIT

## 2018-11-12 PROCEDURE — 25000132 ZZH RX MED GY IP 250 OP 250 PS 637: Performed by: INTERNAL MEDICINE

## 2018-11-12 PROCEDURE — 25000132 ZZH RX MED GY IP 250 OP 250 PS 637: Performed by: HOSPITALIST

## 2018-11-12 PROCEDURE — 25000125 ZZHC RX 250: Performed by: INTERNAL MEDICINE

## 2018-11-12 PROCEDURE — 36415 COLL VENOUS BLD VENIPUNCTURE: CPT | Performed by: PHYSICIAN ASSISTANT

## 2018-11-12 PROCEDURE — 97110 THERAPEUTIC EXERCISES: CPT | Mod: GO

## 2018-11-12 RX ORDER — METHOCARBAMOL 500 MG/1
500 TABLET, FILM COATED ORAL 4 TIMES DAILY PRN
Qty: 120 TABLET | Refills: 0 | Status: SHIPPED | OUTPATIENT
Start: 2018-11-12 | End: 2018-12-05

## 2018-11-12 RX ORDER — ACETAMINOPHEN 325 MG/1
325-650 TABLET ORAL EVERY 4 HOURS PRN
Qty: 100 TABLET | Refills: 0 | Status: SHIPPED | OUTPATIENT
Start: 2018-11-12 | End: 2018-12-17

## 2018-11-12 RX ORDER — FUROSEMIDE 20 MG
20 TABLET ORAL
Qty: 30 TABLET | Refills: 0 | Status: SHIPPED | OUTPATIENT
Start: 2018-11-12 | End: 2018-11-20

## 2018-11-12 RX ORDER — OXYCODONE HYDROCHLORIDE 5 MG/1
5-10 TABLET ORAL EVERY 4 HOURS PRN
Qty: 40 TABLET | Refills: 0 | Status: SHIPPED | OUTPATIENT
Start: 2018-11-12 | End: 2018-11-20

## 2018-11-12 RX ORDER — AMOXICILLIN 250 MG
1-2 CAPSULE ORAL 2 TIMES DAILY PRN
Qty: 100 TABLET | Refills: 0 | Status: SHIPPED | OUTPATIENT
Start: 2018-11-12 | End: 2018-11-20

## 2018-11-12 RX ORDER — NICOTINE 21 MG/24HR
1 PATCH, TRANSDERMAL 24 HOURS TRANSDERMAL DAILY
Qty: 30 PATCH | Refills: 0 | Status: SHIPPED | OUTPATIENT
Start: 2018-11-12 | End: 2018-11-20

## 2018-11-12 RX ORDER — POLYETHYLENE GLYCOL 3350 17 G/17G
17 POWDER, FOR SOLUTION ORAL DAILY
Qty: 7 PACKET | Refills: 0 | Status: SHIPPED | OUTPATIENT
Start: 2018-11-12 | End: 2018-11-20

## 2018-11-12 RX ORDER — ASPIRIN 325 MG
325 TABLET, DELAYED RELEASE (ENTERIC COATED) ORAL DAILY
Qty: 60 TABLET | Refills: 0 | Status: SHIPPED | OUTPATIENT
Start: 2018-11-12 | End: 2019-06-24 | Stop reason: ALTCHOICE

## 2018-11-12 RX ORDER — ATORVASTATIN CALCIUM 40 MG/1
40 TABLET, FILM COATED ORAL EVERY EVENING
Qty: 30 TABLET | Refills: 0 | Status: SHIPPED | OUTPATIENT
Start: 2018-11-12 | End: 2018-11-28

## 2018-11-12 RX ORDER — PANTOPRAZOLE SODIUM 40 MG/1
40 TABLET, DELAYED RELEASE ORAL
Qty: 30 TABLET | Refills: 0 | Status: SHIPPED | OUTPATIENT
Start: 2018-11-13 | End: 2018-12-05

## 2018-11-12 RX ORDER — METOPROLOL SUCCINATE 25 MG/1
25 TABLET, EXTENDED RELEASE ORAL DAILY
Qty: 30 TABLET | Refills: 0 | Status: SHIPPED | OUTPATIENT
Start: 2018-11-12 | End: 2018-11-20

## 2018-11-12 RX ORDER — POTASSIUM CHLORIDE 1500 MG/1
20 TABLET, EXTENDED RELEASE ORAL DAILY
Qty: 15 TABLET | Refills: 0 | Status: SHIPPED | OUTPATIENT
Start: 2018-11-12 | End: 2018-11-20

## 2018-11-12 RX ORDER — LISINOPRIL 2.5 MG/1
2.5 TABLET ORAL DAILY
Qty: 30 TABLET | Refills: 0 | Status: SHIPPED | OUTPATIENT
Start: 2018-11-12 | End: 2018-11-20

## 2018-11-12 RX ORDER — PREDNISONE 10 MG/1
TABLET ORAL
Qty: 21 TABLET | Refills: 0 | Status: SHIPPED | OUTPATIENT
Start: 2018-11-12 | End: 2018-12-05

## 2018-11-12 RX ADMIN — LIDOCAINE 2 PATCH: 560 PATCH PERCUTANEOUS; TOPICAL; TRANSDERMAL at 09:08

## 2018-11-12 RX ADMIN — NICOTINE 1 PATCH: 14 PATCH, EXTENDED RELEASE TRANSDERMAL at 09:07

## 2018-11-12 RX ADMIN — LORATADINE 10 MG: 10 TABLET ORAL at 09:09

## 2018-11-12 RX ADMIN — ASPIRIN 81 MG: 81 TABLET, COATED ORAL at 09:09

## 2018-11-12 RX ADMIN — AMOXICILLIN AND CLAVULANATE POTASSIUM 1 TABLET: 875; 125 TABLET, FILM COATED ORAL at 09:10

## 2018-11-12 RX ADMIN — IPRATROPIUM BROMIDE AND ALBUTEROL SULFATE 3 ML: 2.5; .5 SOLUTION RESPIRATORY (INHALATION) at 07:57

## 2018-11-12 RX ADMIN — PREDNISONE 30 MG: 5 TABLET ORAL at 09:11

## 2018-11-12 RX ADMIN — LISINOPRIL 2.5 MG: 2.5 TABLET ORAL at 09:09

## 2018-11-12 RX ADMIN — POTASSIUM CHLORIDE 20 MEQ: 1500 TABLET, EXTENDED RELEASE ORAL at 09:10

## 2018-11-12 RX ADMIN — PANTOPRAZOLE SODIUM 40 MG: 40 TABLET, DELAYED RELEASE ORAL at 06:50

## 2018-11-12 RX ADMIN — Medication 25 MG: at 09:10

## 2018-11-12 RX ADMIN — FUROSEMIDE 20 MG: 20 TABLET ORAL at 09:09

## 2018-11-12 ASSESSMENT — ACTIVITIES OF DAILY LIVING (ADL)
ADLS_ACUITY_SCORE: 11

## 2018-11-12 NOTE — PLAN OF CARE
Met with patient and his wife. Post op instructions reviewed. Will follow along as out patient. All questions addressed.

## 2018-11-12 NOTE — PROGRESS NOTES
[facility]  Hospitalist Progress Note        Segundo Fernando, DO  11/12/2018        Interval History:      Patient states he is doing well today, discussed plan of care at length, verbalized understanding.          Assessment and Plan:        68-year-old male with PMH of dyslipidemia, and tobacco dependence who was initially admitted 11/4 for NSTEMI.      NSTEMI, CAD s/p three-vessel CABG 11/6 with LIMA to LAD, SVG to OM and PDA: Extubated postoperative day 1. Required pressor support into postop day 2.  Transferred to medical floor by primary service 11/8  - CV surgery following.   - Continue ASA, atorvastatin, metoprolol and lisinopril       Ischemic cardiomyopathy, LVEF 35-40% and WMA all consistent with significant NSTEMI  - Cardiology following      Community acquired pneumonia, Suspected undiagnosed COPD with acute exacerbation Patient presented with ACS but also described several days of flu symptoms with myalgias and cough. Was in [place] for three weeks in 9/2018, patient owns a condo there. CTA was notable for nodular bilateral airspace opacities with mediastinal and hilar adenopathy suggestive of fungal vs bacterial infection vs malignancy. Patient is a lifelong smoker. Noted wheezing this admission in the setting of dyspnea. Fungal studies subsequently returned negative. ID recommends treatment for CAP with IV azithromycin and ceftriaxone x7 days followed by 1 week Augmentin.  - Continued on augmentin.  - Prednisone taper.   - PFT as outpatient  - Repeat CT recommended in 6 weeks and follow up with Pulmonology      Dyslipidemia: FLP 11/05 with , HDL 36, , .  - Continue atorvastatin    Bilateral carotid artery disease, right > 70%: CTA deferred due to recent dye exposure.   - Per cards, CT can be obtained after discharge.    Pre-DM: A1C 5.9: Insulin drip following CABG, discontinued 11/8.  - Monitor as outpatient.     Anemia/Acute thrombocytopenia: Due to CABG.  "  - Monitor as outpatient.     Tobacco Dependence: 1 ppd smoker on admission. Counseled on cessation  - Nicotine patch       DVT Prophylaxis: Defer to Surgery team.     Code: Full Code     Disposition: Discharge per Surgery discretion.                    Physical Exam:      Heart Rate: 78    Blood pressure 115/68, pulse 83, temperature 99  F (37.2  C), temperature source Oral, resp. rate 20, height 1.803 m (5' 11\"), weight 100.4 kg (221 lb 5.5 oz), SpO2 96 %.    Vitals:    11/10/18 0500 18 0642 18 0600   Weight: 104.2 kg (229 lb 11.5 oz) 103 kg (227 lb 1.2 oz) 100.4 kg (221 lb 5.5 oz)       Vital Sign Ranges  Temperature Temp  Av.5  F (36.9  C)  Min: 97.9  F (36.6  C)  Max: 99  F (37.2  C)   Blood pressure Systolic (24hrs), Av , Min:100 , Max:121        Diastolic (24hrs), Av, Min:49, Max:70      Pulse Pulse  Av.5  Min: 83  Max: 86   Respirations Resp  Av.6  Min: 18  Max: 20   Pulse oximetry SpO2  Av.4 %  Min: 94 %  Max: 98 %     Vital Signs with Ranges  Temp:  [97.9  F (36.6  C)-99  F (37.2  C)] 99  F (37.2  C)  Pulse:  [83-86] 83  Heart Rate:  [68-84] 78  Resp:  [18-20] 20  BP: (100-121)/(49-70) 115/68  SpO2:  [94 %-98 %] 96 %    I/O Last 3 Shifts:   I/O last 3 completed shifts:  In: 830 [P.O.:830]  Out: 1250 [Urine:1250]    I/O past 24 hours:     Intake/Output Summary (Last 24 hours) at 18 0824  Last data filed at 18 0651   Gross per 24 hour   Intake              830 ml   Output             1000 ml   Net             -170 ml     GENERAL: Alert and oriented. NAD. Conversational, appropriate.   HEENT: Normocephalic. EOMI. No icterus or injection. Nares normal.   LUNGS: Clear to auscultation, minimal decrement to bases. No dyspnea at rest.   HEART: Regular rate. Extremities perfused.   ABDOMEN: Soft, nontender, and nondistended. Positive bowel sounds.   EXTREMITIES: LE edema noted.   NEUROLOGIC: Moves extremities x4. No acute focal neurologic abnormalities noted. "          Prior to Admission Medications:        Prescriptions Prior to Admission   Medication Sig Dispense Refill Last Dose     loratadine (CLARITIN) 10 MG tablet Take 10 mg by mouth daily   11/3/2018 at Unknown time     [DISCONTINUED] naproxen sodium 220 MG capsule Take 220 mg by mouth 2 times daily (with meals)   11/3/2018 at Unknown time            Medications:        Current Facility-Administered Medications   Medication Last Rate     IV fluid REPLACEMENT ONLY       Current Facility-Administered Medications   Medication Dose Route Frequency     acetaminophen  975 mg Oral Q8H     amoxicillin-clavulanate  1 tablet Oral Q12H YOSSI     aspirin  81 mg Oral Daily     atorvastatin  40 mg Oral QPM     furosemide  20 mg Oral BID     ipratropium - albuterol 0.5 mg/2.5 mg/3 mL  3 mL Nebulization 4x daily     lidocaine  2 patch Transdermal Q24H     lidocaine   Transdermal Q8H     lidocaine   Transdermal Q24h     lisinopril  2.5 mg Oral Daily     loratadine  10 mg Oral Daily     metoprolol succinate  25 mg Oral Daily     nicotine  1 patch Transdermal Daily     nicotine   Transdermal Q8H     nicotine   Transdermal Daily     pantoprazole  40 mg Oral QAM AC     polyethylene glycol  17 g Oral Daily     potassium chloride  20 mEq Oral Daily     predniSONE  30 mg Oral Daily    Followed by     [START ON 11/15/2018] predniSONE  20 mg Oral Daily    Followed by     [START ON 11/20/2018] predniSONE  10 mg Oral Daily     senna-docusate  1 tablet Oral BID    Or     senna-docusate  2 tablet Oral BID     sodium chloride (PF)  3 mL Intracatheter Q8H     Current Facility-Administered Medications   Medication Dose Route Frequency     IV fluid REPLACEMENT ONLY   Intravenous Continuous PRN     glucose  15-30 g Oral Q15 Min PRN    Or     dextrose  25-50 mL Intravenous Q15 Min PRN    Or     glucagon  1 mg Subcutaneous Q15 Min PRN     diphenhydrAMINE  12.5 mg Oral Q6H PRN    Or     diphenhydrAMINE  12.5 mg Intravenous Q6H PRN     hydrALAZINE  10 mg  Intravenous Q30 Min PRN     HYDROmorphone  0.3-0.5 mg Intravenous Q1H PRN     insulin aspart   Subcutaneous With Snacks or Supplements     lidocaine 4%   Topical Q1H PRN     lidocaine (buffered or not buffered)  1 mL Other Q1H PRN     magnesium sulfate  2 g Intravenous Daily PRN     magnesium sulfate  4 g Intravenous Q4H PRN     melatonin  1 mg Oral At Bedtime PRN     methocarbamol  500 mg Oral 4x Daily PRN     naloxone  0.1-0.4 mg Intravenous Q2 Min PRN     ondansetron  4 mg Oral Q6H PRN    Or     ondansetron  4 mg Intravenous Q6H PRN     oxyCODONE IR  5-10 mg Oral Q4H PRN     potassium chloride  20-40 mEq Oral or Feeding Tube Q2H PRN     potassium chloride with lidocaine  10 mEq Intravenous Q1H PRN     potassium chloride  10 mEq Intravenous Q1H PRN     potassium chloride  20 mEq Intravenous Q1H PRN     potassium chloride  20-40 mEq Oral Q2H PRN     sodium chloride (PF)  3 mL Intracatheter Q1H PRN            Data:      Lab data reviewed.     Recent Labs  Lab 11/12/18  0656 11/11/18  0704 11/10/18  0721 11/09/18  0735  11/06/18  1304  11/06/18  1130   HGB  --  9.3* 9.1* 9.2*  < > 10.9*  < > 8.8*  9.9*   MCV  --  103* 103* 103*  < > 105*  --  105*   PLT  --  160 125* 101*  < > 150  --  73*   INR  --   --   --   --   --  1.37*  --  1.78*    148* 144 146*  < > 143  < > 144  142   POTASSIUM 3.9 3.9 4.3 4.7  < > 4.8  < > 4.6  4.6   CHLORIDE 109 113* 113* 115*  < > 113*  --  113*   CO2 30 29 27 25  < > 20  --  20   BUN 27 33* 34* 32*  < > 35*  --   --    CR 0.94 1.05 0.93 0.99  < > 1.36*  --   --    ANIONGAP 5 6 4 6  < > 10  --  11   CONOR 8.7 8.7 8.6 8.4*  < > 7.7*  --   --    GLC 99 101* 122* 135*  < > 177*  --   --    < > = values in this interval not displayed.        Imaging:      Imaging data reviewed.     Dr. Segundo Fernando D.O.  [facility]  Pager [phone]

## 2018-11-12 NOTE — PLAN OF CARE
Problem: Patient Care Overview  Goal: Plan of Care/Patient Progress Review  Outcome: No Change  Vital Signs stable, pt on room air. Telemetry sinus rhythm. Alert and Oriented. Lung sounds diminished throughout, IS to 2000 and acapella in use. Bowel sounds active and audible. Passing flatus, pt refused stool softener. Regular diet. Denies nausea. Adequate urinary output. CMS intact, bilateral lower extremity edema present +2, compressing stocking on during the day, removed for night. Pulses palpable. Sternal incision is clean dry and intact with liquid bandage closure and ecchymosis that surrounds site. Chest tube dressing is marked. Up independently in room. Pt has denied pain.

## 2018-11-12 NOTE — PLAN OF CARE
Problem: Cardiac Surgery (Adult)  Goal: Signs and Symptoms of Listed Potential Problems Will be Absent, Minimized or Managed (Cardiac Surgery)  Signs and symptoms of listed potential problems will be absent, minimized or managed by discharge/transition of care (reference Cardiac Surgery (Adult) CPG).   Outcome: Adequate for Discharge Date Met: 11/12/18  Pt discharged today after discharge instructions given to patient and wife with verbalizing understanding of these and Rx's filled and given to patient and reviewed  They verbalize understanding of these. Pt telemetry NSR denies pain at discharge voiding passing flatus and reports has had a bowel movement. Pt reports he has watched the cardiac videos.  Savannah Richmond R.N. In seeing patient and wife with instructions .  Pt escorted to door in w/c with nursing assistant [place]

## 2018-11-12 NOTE — DISCHARGE SUMMARY
[facility], [place]   [facility] Discharge Summary       David Richard Schoenecker MRN# [id number]   YOB: 1950 Age: 68 year old     Date of Admission:  11/4/2018  Date of Discharge::  11/12/2018  Admitting Physician:  Sander Wan MD  Discharge Physician:  Juan Garcia   Primary Care Physician:        Naseem Ramirez          Admission Diagnoses:   NSTEMI  Coronary artery disease  Ischemic cardiomyopathy - EF 30-35%  Dyslipidemia  Tobacco dependence          Discharge Diagnosis:   Coronary artery disease - s/p CABG  NSTEMI  Ischemic cardiomyopathy - EF 30-35%  Community acquired pneumonia  Suspected COPD exacerbation  Dyslipidemia  Tobacco dependence  Bilateral carotid artery disease, Right >70%         Procedures:   11/6/2018 Dr. Snader Wan:  Coronary artery bypass grafting x 3 with left internal mammary artery to the left anterior descending, reverse saphenous vein graft to the obtuse marginal artery, reverse saphenous vein graft to the posterior descending artery, endoscopic vein harvest in the left lower extremity, intraoperative BAL.         Non-operative procedures:   Cardiac catheterization          Consultations:   PHARMACY TO DOSE HEPARIN  PHARMACY TO DOSE HEPARIN  INFECTIOUS DISEASES IP CONSULT  PULMONARY IP CONSULT  RESPIRATORY CARE IP CONSULT  PHARMACY IP CONSULT  PHARMACY IP CONSULT  NUTRITION SERVICES ADULT IP CONSULT  CARDIAC REHAB IP CONSULT  INTENSIVIST IP CONSULT  RESPIRATORY CARE IP CONSULT  RESPIRATORY CARE IP CONSULT  RESPIRATORY CARE IP CONSULT  PHARMACY IP CONSULT  CORE CLINIC EVALUATION IP CONSULT  HOSPITALIST IP CONSULT  CARE TRANSITION RN/SW IP CONSULT  SMOKING CESSATION PROGRAM IP CONSULT          Brief History of Illness:   David Richard Schoenecker is a 68 year old male with a past medical history of dyslipidemia, tobacco dependence who presented on 11/4 with chest pain, NSTEMI. Patient underwent a  coronary angiogram which showed severe left main and three vessel disease. He was transferred to ICU with IABP in place and CT surgery was consulted for CABG.            Hospital Course:   David Richard Schoenecker is a 68 year old male who on 11/6/2018 underwent the above-named procedures.  Patient tolerated the operation well and was admitted to the CVICU post-operatively.  Patient was extubated on POD # 1. IABP was removed without incident. Pressors were weaned off as able. Patient's ICU stay was unremarkable. Patient was transferred to the surgical floor on POD # 2. Chest tubes were watersealed, then clamped due to airleak and tidaling, CXR without significant pneumothorax, then tubes were removed. Follow-up CXR was negative for any significant pneumothorax. TPW were removed when appropriate.    Patient continued to improve post-operatively. Patient was started on  mg daily, atorvastatin 40 mg daily, Toprol XL 25 mg daily and lisinopril 2.5 mg daily. Patient remained hemodynamically stable. Patient was diuresed with lasix 20 mg IV BID and transitioned to 20 mg PO BID with K replacement. He will follow-up with [facility] in 1 week for heart failure follow-up.     Pre-operatively, patient was diagnosed with community acquired pneumonia and was started on ceftriaxone. He was treated with 1 week course of ceftriaxone and will be discharged on one week course of Augmentin. He was also diagnosed with a likely COPD exacerbation. He will be discharged on a steroid taper over the next 2 weeks.    Patient's carotid US found greater than 70% right carotid artery stenosis. Cards recommending outpatient CTA neck to evaluate this further.     Post-operative pain control included IV dilaudid, PO oxycodone, robaxin and tylenol and will be discharged on oxycodone, robaxin and tylenol.     Prior to discharge, patient's pain was controlled well, he was able to perform most ADLs and ambulate without difficulty, and had full  return of bowel and bladder function.  On November 12, 2018, he was Discharged to home in stable condition.      Day of Discharge Exam   Vitals:  Temp:  [97.9  F (36.6  C)-99  F (37.2  C)] 97.9  F (36.6  C)  Pulse:  [] 73  Heart Rate:  [68-84] 78  Resp:  [18-20] 20  BP: ()/(49-70) 96/64  SpO2:  [94 %-98 %] 94 %  Wt: 221 lbs 5.47 oz, 100 kg   Physical Exam:   Gen: A&Ox3, NAD, conversational  CV: RRR, S1S2 normal, no murmurs, rubs, or gallops  Pulm: Lungs CTA, no rhonchi or wheezes  Abd: +BS, Soft, nondistended, NTTP  Ext: 1+ bilateral lower extremity edema  Neuro: CN II-XII intact, no focal deficits  Incision: Sternal and LLE incision clean, dry, intact, no erythema, sternum stable   Chest Tube sites:  Dressings clean and dry    Labs:   BMP    Recent Labs  Lab 11/12/18  0656 11/11/18  0704 11/10/18  0721 11/09/18  0735 11/08/18  0345 11/07/18  1445 11/07/18  0404 11/06/18  1304    148* 144 146* 146*  --  147* 143   POTASSIUM 3.9 3.9 4.3 4.7 4.8 4.4 4.0 4.8   CHLORIDE 109 113* 113* 115* 117*  --  118* 113*   CO2 30 29 27 25 24  --  22 20   BUN 27 33* 34* 32* 30  --  31* 35*   CR 0.94 1.05 0.93 0.99 0.95  --  1.27* 1.36*   GLC 99 101* 122* 135* 158*  --  127* 177*   MAG  --   --   --   --  2.6*  --  2.5* 2.8*   PHOS  --   --   --   --  3.0 2.1* 2.2* 5.1*     CBC    Recent Labs  Lab 11/11/18  0704 11/10/18  0721 11/09/18  0735 11/08/18  0345   WBC 8.5 10.4 11.4* 9.6   RBC 2.76* 2.64* 2.63* 2.35*   HGB 9.3* 9.1* 9.2* 8.2*   HCT 28.3* 27.1* 27.2* 24.5*   * 103* 103* 104*   MCH 33.7* 34.5* 35.0* 34.9*   MCHC 32.9 33.6 33.8 33.5   RDW 12.9 12.9 12.9 13.1    125* 101* 75*     INR    Recent Labs  Lab 11/06/18  1304 11/06/18  1130   INR 1.37* 1.78*      Hepatic Panel No lab results found in last 7 days.         Imaging Studies:   CXR 11/11:  Single view of the chest. Lungs are clear. Heart is normal  in size. No pneumothorax. Prior CABG changes. Mediastinal drain has  been removed.     Coronary  angiogram 11/5:  LEFT MAIN CORONARY ARTERY: The left main has a thrombotic 95% stenosis  involving the LAD, circumflex and ramus.      LEFT ANTERIOR DESCENDING ARTERY: The proximal LAD has a 50% stenosis.  The mid LAD has discrete 80% stenosis. The distal LAD has a discrete  85% stenosis. The first diagonal branch is a small vessel with 70%  stenosis.      CIRCUMFLEX ARTERY: Circumflex appears to be occluded at its ostium.  There is a faint right to left collaterals which appears to be filling  the distal circumflex. There is a large ramus branch with 60% proximal  stenosis and 70% mid stenosis.      RIGHT CORONARY ARTERY: This is a moderate size codominant vessel. The  proximal segment has 40% stenosis. The mid segment has a 70% stenosis.  The distal RCA has a 99% discrete stenosis. The right PDA appears to  have mild to moderate disease.    Echo 11/4:  Left Ventricle  The left ventricle is normal in size. There is mild eccentric left ventricular  hypertrophy. Left ventricular systolic function is moderately reduced. The  visual ejection fraction is estimated at 30-35%. Left ventricular diastolic  function is indeterminate. Apical, distal anterior, mid and distal lateral  severe hypokinesis. There are regional wall motion abnormalities as specified.  There is moderate inferolateral wall hypokinesis.     Right Ventricle  The right ventricle is normal size. The right ventricular systolic function is  normal.     Atria  Normal left atrial size. Right atrial size is normal.     Mitral Valve  There is trace mitral regurgitation.      Tricuspid Valve  There is trace tricuspid regurgitation. The right ventricular systolic  pressure is approximated at 14.4 mmHg plus the right atrial pressure. Dilated  IVC (>2.5cm) with <50% respiratory collapse; right atrial pressure is  estimated at 15-20mmHg.     Aortic Valve  The aortic valve is not well visualized. No aortic regurgitation is present.  No aortic stenosis is  present.     Pulmonic Valve  The pulmonic valve is not well visualized.     Vessels  The aortic root is normal size.     Pericardium  There is no pericardial effusion.       Final Pathology/Culture Result:   Blood culture 11/7 NGTD  Sputum culture 11/4 NGTD  Urine culture 11/7 NGTD      Drains/tubes Present at Discharge   None         Medications Prior to Admission:     Prescriptions Prior to Admission   Medication Sig Dispense Refill Last Dose     loratadine (CLARITIN) 10 MG tablet Take 10 mg by mouth daily   11/3/2018 at Unknown time     [DISCONTINUED] naproxen sodium 220 MG capsule Take 220 mg by mouth 2 times daily (with meals)   11/3/2018 at Unknown time          Discharge Medications:        Review of your medicines      START taking       Dose / Directions    acetaminophen 325 MG tablet   Commonly known as:  TYLENOL   Used for:  Acute post-operative pain        Dose:  325-650 mg   Take 1-2 tablets (325-650 mg) by mouth every 4 hours as needed for mild pain   Quantity:  100 tablet   Refills:  0       amoxicillin-clavulanate 875-125 MG per tablet   Commonly known as:  AUGMENTIN   Indication:  Community Acquired Pneumonia   Used for:  Pneumonia due to infectious organism, unspecified laterality, unspecified part of lung        Dose:  1 tablet   Take 1 tablet by mouth every 12 hours   Quantity:  11 tablet   Refills:  0       aspirin 325 MG EC tablet        Dose:  325 mg   Take 1 tablet (325 mg) by mouth daily   Quantity:  60 tablet   Refills:  0       atorvastatin 40 MG tablet   Commonly known as:  LIPITOR        Dose:  40 mg   Take 1 tablet (40 mg) by mouth every evening   Quantity:  30 tablet   Refills:  0       furosemide 20 MG tablet   Commonly known as:  LASIX   Used for:  Ischemic cardiomyopathy        Dose:  20 mg   Take 1 tablet (20 mg) by mouth 2 times daily   Quantity:  30 tablet   Refills:  0       lisinopril 2.5 MG tablet   Commonly known as:  PRINIVIL/Zestril        Dose:  2.5 mg   Take 1 tablet (2.5  mg) by mouth daily   Quantity:  30 tablet   Refills:  0       methocarbamol 500 MG tablet   Commonly known as:  ROBAXIN   Used for:  Acute post-operative pain        Dose:  500 mg   Take 1 tablet (500 mg) by mouth 4 times daily as needed for muscle spasms   Quantity:  120 tablet   Refills:  0       metoprolol succinate 25 MG 24 hr tablet   Commonly known as:  TOPROL-XL        Dose:  25 mg   Take 1 tablet (25 mg) by mouth daily   Quantity:  30 tablet   Refills:  0       nicotine 14 MG/24HR 24 hr patch   Commonly known as:  NICODERM CQ        Dose:  1 patch   Place 1 patch onto the skin daily   Quantity:  30 patch   Refills:  0       oxyCODONE IR 5 MG tablet   Commonly known as:  ROXICODONE   Used for:  Acute post-operative pain        Dose:  5-10 mg   Take 1-2 tablets (5-10 mg) by mouth every 4 hours as needed for moderate to severe pain   Quantity:  40 tablet   Refills:  0       pantoprazole 40 MG EC tablet   Commonly known as:  PROTONIX        Dose:  40 mg   Start taking on:  11/13/2018   Take 1 tablet (40 mg) by mouth every morning (before breakfast) For 30 days, then discontinue   Quantity:  30 tablet   Refills:  0       polyethylene glycol Packet   Commonly known as:  MIRALAX/GLYCOLAX        Dose:  17 g   Take 17 g by mouth daily   Quantity:  7 packet   Refills:  0       potassium chloride SA 20 MEQ CR tablet   Commonly known as:  K-DUR/KLOR-CON M   Used for:  Ischemic cardiomyopathy        Dose:  20 mEq   Take 1 tablet (20 mEq) by mouth daily   Quantity:  15 tablet   Refills:  0       predniSONE 10 MG tablet   Commonly known as:  DELTASONE        Take 30 mg (3 tabs) PO daily x2 days, then take 20 mg (2 tabs) PO daily x5 days, then take 10 mg (1 tab) PO daily x5 days, then stop   Quantity:  21 tablet   Refills:  0       senna-docusate 8.6-50 MG per tablet   Commonly known as:  SENOKOT-S;PERICOLACE        Dose:  1-2 tablet   Take 1-2 tablets by mouth 2 times daily as needed for constipation   Quantity:  100  tablet   Refills:  0         CONTINUE these medicines which have NOT CHANGED       Dose / Directions    loratadine 10 MG tablet   Commonly known as:  CLARITIN        Dose:  10 mg   Take 10 mg by mouth daily   Refills:  0         STOP taking          naproxen sodium 220 MG capsule                Where to get your medicines      These medications were sent to [facility] [place], [place] - [address]  [address], [place] [address]     Phone:  [phone]      acetaminophen 325 MG tablet     amoxicillin-clavulanate 875-125 MG per tablet     aspirin 325 MG EC tablet     atorvastatin 40 MG tablet     furosemide 20 MG tablet     lisinopril 2.5 MG tablet     methocarbamol 500 MG tablet     metoprolol succinate 25 MG 24 hr tablet     nicotine 14 MG/24HR 24 hr patch     pantoprazole 40 MG EC tablet     polyethylene glycol Packet     potassium chloride SA 20 MEQ CR tablet     predniSONE 10 MG tablet     senna-docusate 8.6-50 MG per tablet         Some of these will need a paper prescription and others can be bought over the counter. Ask your nurse if you have questions.     Bring a paper prescription for each of these medications      oxyCODONE IR 5 MG tablet                  Discharge Instructions and Follow-Up:   Avoid lifting anything greater than ten pounds for 6 weeks after surgery and then less than 20 pounds for an additional 6 weeks.    No driving for 4 weeks after surgery or while on pain medication. If you had a minimally invasive procedure, you may drive after three weeks if not taking pain medication.      Avoid strenuous activities such as bowling, vacuuming, raking, shoveling, golf or tennis for 12 weeks after your surgery. Splint chest incision by hugging a pillow or bringing arms across chest when coughing or sneezing. Avoid pushing off with arms when getting up for the first month if sternum was opened.    Shower or wash incisions daily with soap and water (or as instructed),  pat dry. Watch for signs of infection: increased redness, tenderness, warmth or any drainage that appears infected (pus like) or is persistent.  Also a temperature > 100.5 F or chills. Call surgeon or primary care provider's office immediately. Remove any skin glue left on incisions after 10 days. This will not affect the incision and can speed up healing.    Avoid sitting for prolonged periods of time, try to walk every hour during the day. If patient has a leg incision, patient should elevate leg often when not walking.    Check weight when arriving at home from the hospital and continue to check it daily through recovery for at least a month. Patient instructed to call with any weight gain more than 3 pounds overnight or 5 pounds in a week.     We recommend using stool softeners while using narcotics for pain.      DENTAL VISITS AFTER SURGERY  If a heart valve was repaired or replaced, we do not recommend having any dental work done for 6 months and we recommend taking an antibiotic prior to dental visits from now on.  Patient is to notify their dentist before any procedure for the proper treatment needed. The antibiotic is taken by mouth one hour prior to visit. This includes routine cleanings.    POST-OPERATIVE CLINIC VISITS  Patient has a follow up visit with [facility] Practitioners on 11/28/2018 at 2pm [facility] in [place], [place].  They will then return to the care of their primary physician and cardiologist. Instructed to see PCP within 3-5 days of discharge and cardiologist at 1 month post surgery.         Home Health Care:   Not needed           Discharge Disposition:   Discharged to home      Condition at discharge: Stable    Juan Garcia PA-C            CC:Naseem Camacho Sara PA-C       no

## 2018-11-12 NOTE — PROGRESS NOTES
"PULMONOLOGY PROGRESS NOTE  Date of service: 2018  [facility]    CC/Reason for Visit: abnormal CT Chest.    SUBJECTIVE      HPI:No new events overnight,  Afebrile. No worsening respiratory complaints at this time. CABG x 3 done , patient extubated ,  Breathing comfortably at present.    ROS: A Problem Pertinent review of systems was negative except for items noted in HPI.    Past Medical, Family, and Social/Substance History has been reviewed: No interval changes.    OBJECTIVE   /60 (BP Location: Left arm)  Pulse 77  Temp 99  F (37.2  C) (Oral)  Resp 20  Ht 1.803 m (5' 11\")  Wt 100.4 kg (221 lb 5.5 oz)  SpO2 96%  BMI 30.87 kg/m2 2 L/min     Temp (24hrs), Av.2  F (36.8  C), Min:97.7  F (36.5  C), Max:98.7  F (37.1  C)       Intake/Output Summary (Last 24 hours) at 18 1011  Last data filed at 18 0915   Gross per 24 hour   Intake             1030 ml   Output             1405 ml   Net             -375 ml     Patient Vitals for the past 96 hrs:   Weight   18 0600 100.4 kg (221 lb 5.5 oz)   18 0642 103 kg (227 lb 1.2 oz)   11/10/18 0500 104.2 kg (229 lb 11.5 oz)   18 0400 103.3 kg (227 lb 11.8 oz)       CONSTITUTIONAL/GENERAL APPEARANCE: Alert. No Apparent Distress.  PSYCHIATRIC: Pleasant and appropriate mood and affect. Oriented x 3.  EARS, NOSE,THROAT,MOUTH: External ears and nose overall normal. Normal oral mucosa.   NECK: Neck appearance normal. No neck masses and the thyroid is not enlarged.   RESPIRATORY: Non-labored effort. Decreased BS, sl coarse posteriorly.  CARDIOVASCULAR: S1, S2, regular rate and rhythm.      LABORATORY ASSESSMENT        Recent Labs  Lab 18  1000 18  1630   PH 7.33* 7.30*   PCO2 42 38   PO2 252* 131*   HCO3 22 18*       Recent Labs  Lab 18  0704 11/10/18  0721   WBC 8.5 10.4   RBC 2.76* 2.64*   HGB 9.3* 9.1*   HCT 28.3* 27.1*   * 103*   MCH 33.7* 34.5*   MCHC 32.9 33.6   RDW 12.9 12.9   PLT " 160 125*       Recent Labs  Lab 11/12/18  0656 11/11/18  0704 11/10/18  0721    148* 144   POTASSIUM 3.9 3.9 4.3   CHLORIDE 109 113* 113*   CO2 30 29 27   ANIONGAP 5 6 4   BUN 27 33* 34*   CR 0.94 1.05 0.93   GFRESTIMATED 79 70 80   GFRESTBLACK >90 84 >90   CONOR 8.7 8.7 8.6     No lab results found in last 7 days.    Recent Labs  Lab 11/06/18  1304   INR 1.37*       Additional labs and/or comments: Fungal serologies negative.    IMAGING      CXR 11/7 -  IMPRESSION: Lungs are slightly hypoinflated with perihilar and basilar  opacities favored to represent atelectasis. Postoperative changes of  coronary artery bypass grafting noted. Heart size is unchanged. Tubes  and lines are unchanged.    CT Chest 11/4 -  IMPRESSION:  1. No evidence of aortic dissection.  2. Nodular bilateral airspace opacities surrounded by groundglass  opacity. There is associated mediastinal and left hilar adenopathy.  Radiographic findings suggestive of a fungal infection, although  multifocal bacterial pneumonia or metastatic disease can have a  similar appearance.  3. Nonobstructing bilateral nephrolithiasis.  4. Small hiatal hernia.    PFT & OTHER TESTING       ASSESSMENT / PLAN      Active Problems:    ACS (acute coronary syndrome) (H)    S/P CABG (coronary artery bypass graft)      ASSESSMENT: 67 yo male smoker (no prior hx lung disease) admitted with NSTEMI and new ischemic CM. Patient now POD #3 CABG x 3. CXR on admission clear. CT Chest showed nodular bilateral airspace opacities surrounded by groundglass opacity with associated mediastinal and left hilar adenopathy. DDx infectious and inflammatory processes (malignancy unlikely). Patient seen by ID, Sputum C&S and fungal serologies negative. Patient treated with empiric antibiotics, with plan for repeat CT Chest as an outpatient to follow-up the pulmonary infiltrates. Respiratory status currently stable on low flow O2.    Diagnoses  Abnl CT/CXR  R91.8  Adenopathy  R59.9  Nicotine  depend F17.210  SOB   R06.02    PLAN:  1. Wean off O2, keep SaO2 > 90%  2. Bronchodilators - DuoNebs.  3. Antibiotics - Rocephin.  4. Steroids - Prednisone taper.  5. Smoking cessation, Nicotine patch.  6. Increase activity and IS.  7. Recommend follow-up CT Chest in 6 weeks.  8. Outpatient Pulmonary follow-up to review CT Chest and check PFTs to assess for underlying COPD.  9. Follow up after CT  10. Continue cardiac rehab       Andre Levy M.D.  Pulmonary, Critical Care and Sleep Medicine  [facility]  Pager: [phone]  Office:[phone]

## 2018-11-12 NOTE — PLAN OF CARE
Problem: Patient Care Overview  Goal: Plan of Care/Patient Progress Review  Discharge Planner OT   Patient plan for discharge: Home.  Current status: Exercised for 11 min with stable CV response. Anxious to leave, shortened session on treadmill.  Barriers to return to prior living situation: None.  Recommendations for discharge: Home with OP CR @ [facility] (staying with his daughter on discharge).  Rationale for recommendations: Would benefit from continued monitored exercise.       Entered by: Zenaida Cartwright 11/12/2018 10:01 AM

## 2018-11-13 LAB
BACTERIA SPEC CULT: NO GROWTH
BACTERIA SPEC CULT: NO GROWTH
Lab: NORMAL
Lab: NORMAL
SPECIMEN SOURCE: NORMAL
SPECIMEN SOURCE: NORMAL

## 2018-11-13 NOTE — ADDENDUM NOTE
Addendum  created 11/13/18 1347 by Elisabeth Sterling    Anesthesia Event edited, Anesthesia Intra Flowsheets edited

## 2018-11-14 ENCOUNTER — TELEPHONE (OUTPATIENT)
Dept: OTHER | Facility: CLINIC | Age: 68
End: 2018-11-14

## 2018-11-14 DIAGNOSIS — Z95.1 S/P CABG (CORONARY ARTERY BYPASS GRAFT): Primary | ICD-10-CM

## 2018-11-14 NOTE — TELEPHONE ENCOUNTER
48 hour post op call    ACTIVITY  How are you doing with activity?  Yes I am doing well.   Are you continuing to use your IS 3-4 times a day and do you have any shortness of breath. My breathing is really good.   Are you weighing yourself daily and has it been stable?.  It is slowly coming down.   PAIN  How is your pain , what are you doing for your pain?   I am not having any pain. I haven't needed to take anything for 2 days.   Are you still doing sternal precautions?  yes  Do you hear any clicking when you are moving or taking a deep breath? No     INCISION: It is looking really good,    ASSISTANCE  Do you have someone at home to help you with your daily activities and transportation needs?  Yes, my wife      MEDICATIONS  I would like to review your discharge medications and answer any questions you may have.   Reviewed     Are you on a blood thinner/Coumadin  No    Who is managing your Coumadin dosing/INR.       FOLLOW UP       Scheduled for cardiac rehab?  11/15  Scheduled to see our PA or Surgeon? 11/15  Scheduled to see your cardiologist ?  [facility] 11/20 souleymane Young, Cardiology [place] 12/14    Scheduled to see your primary care physician? 2 weeks  Patient needs CTA neck with possible Vascular Surgery consult, Chest CT with follow up with Dr Palacio, pulmonology in 4 weeks. Patient's wife will call me with availability to schedule procedures and follow up/   Do you have our contact information? Yes    [employer]LIGHT TOOL      Were you happy with your care? yes  Could we have done anything better?  We were very happy with our care. I can't think of one thing that could of gone better.

## 2018-11-15 ENCOUNTER — HOSPITAL ENCOUNTER (OUTPATIENT)
Dept: CARDIAC REHAB | Facility: CLINIC | Age: 68
End: 2018-11-15
Attending: PHYSICIAN ASSISTANT
Payer: COMMERCIAL

## 2018-11-15 DIAGNOSIS — Z95.1 S/P CABG (CORONARY ARTERY BYPASS GRAFT): ICD-10-CM

## 2018-11-15 PROCEDURE — 93797 PHYS/QHP OP CAR RHAB WO ECG: CPT

## 2018-11-15 PROCEDURE — 40000116 ZZH STATISTIC OP CR VISIT

## 2018-11-15 PROCEDURE — 40000575 ZZH STATISTIC OP CARDIAC VISIT #2

## 2018-11-15 PROCEDURE — 93798 PHYS/QHP OP CAR RHAB W/ECG: CPT

## 2018-11-19 ENCOUNTER — HOSPITAL ENCOUNTER (OUTPATIENT)
Dept: CARDIAC REHAB | Facility: CLINIC | Age: 68
End: 2018-11-19
Attending: PHYSICIAN ASSISTANT
Payer: COMMERCIAL

## 2018-11-19 PROCEDURE — 93798 PHYS/QHP OP CAR RHAB W/ECG: CPT

## 2018-11-19 PROCEDURE — 40000116 ZZH STATISTIC OP CR VISIT

## 2018-11-20 ENCOUNTER — OFFICE VISIT (OUTPATIENT)
Dept: CARDIOLOGY | Facility: CLINIC | Age: 68
End: 2018-11-20
Payer: COMMERCIAL

## 2018-11-20 VITALS
HEART RATE: 60 BPM | DIASTOLIC BLOOD PRESSURE: 48 MMHG | BODY MASS INDEX: 29.26 KG/M2 | SYSTOLIC BLOOD PRESSURE: 82 MMHG | WEIGHT: 209.8 LBS

## 2018-11-20 DIAGNOSIS — I25.5 ISCHEMIC CARDIOMYOPATHY: ICD-10-CM

## 2018-11-20 DIAGNOSIS — Z95.1 S/P CABG (CORONARY ARTERY BYPASS GRAFT): ICD-10-CM

## 2018-11-20 LAB
ANION GAP SERPL CALCULATED.3IONS-SCNC: 8 MMOL/L (ref 3–14)
BUN SERPL-MCNC: 24 MG/DL (ref 7–30)
CALCIUM SERPL-MCNC: 9 MG/DL (ref 8.5–10.1)
CHLORIDE SERPL-SCNC: 106 MMOL/L (ref 94–109)
CO2 SERPL-SCNC: 26 MMOL/L (ref 20–32)
CREAT SERPL-MCNC: 0.91 MG/DL (ref 0.66–1.25)
GFR SERPL CREATININE-BSD FRML MDRD: 82 ML/MIN/1.7M2
GLUCOSE SERPL-MCNC: 133 MG/DL (ref 70–99)
NT-PROBNP SERPL-MCNC: 2928 PG/ML (ref 0–125)
POTASSIUM SERPL-SCNC: 4.4 MMOL/L (ref 3.4–5.3)
SODIUM SERPL-SCNC: 140 MMOL/L (ref 133–144)
TSH SERPL DL<=0.005 MIU/L-ACNC: 1.7 MU/L (ref 0.4–4)

## 2018-11-20 PROCEDURE — 80048 BASIC METABOLIC PNL TOTAL CA: CPT | Performed by: PHYSICIAN ASSISTANT

## 2018-11-20 PROCEDURE — 36415 COLL VENOUS BLD VENIPUNCTURE: CPT | Performed by: PHYSICIAN ASSISTANT

## 2018-11-20 PROCEDURE — 83880 ASSAY OF NATRIURETIC PEPTIDE: CPT | Performed by: PHYSICIAN ASSISTANT

## 2018-11-20 PROCEDURE — 84443 ASSAY THYROID STIM HORMONE: CPT | Performed by: PHYSICIAN ASSISTANT

## 2018-11-20 PROCEDURE — 99214 OFFICE O/P EST MOD 30 MIN: CPT | Performed by: PHYSICIAN ASSISTANT

## 2018-11-20 RX ORDER — METOPROLOL SUCCINATE 25 MG/1
25 TABLET, EXTENDED RELEASE ORAL DAILY
Qty: 90 TABLET | Refills: 3 | Status: SHIPPED | OUTPATIENT
Start: 2018-11-20 | End: 2018-11-28

## 2018-11-20 RX ORDER — LISINOPRIL 2.5 MG/1
2.5 TABLET ORAL DAILY
Qty: 90 TABLET | Refills: 3 | Status: SHIPPED | OUTPATIENT
Start: 2018-11-20 | End: 2018-11-28

## 2018-11-20 RX ORDER — FUROSEMIDE 20 MG
20 TABLET ORAL DAILY
Qty: 90 TABLET | Refills: 3 | Status: SHIPPED | OUTPATIENT
Start: 2018-11-20 | End: 2018-11-28

## 2018-11-20 RX ORDER — POTASSIUM CHLORIDE 1500 MG/1
20 TABLET, EXTENDED RELEASE ORAL DAILY
Qty: 90 TABLET | Refills: 3 | Status: SHIPPED | OUTPATIENT
Start: 2018-11-20 | End: 2018-11-28

## 2018-11-20 NOTE — LETTER
11/20/2018    Naseem Ramirez MD  [facility] [address]  [id number]  [facility] [address]    RE: David Richard Schoenecker       Dear Colleague,    I had the pleasure of seeing David Richard Schoenecker in the [facility].    [id number]  HPI and Plan:   See dictation    Orders this Visit:  Orders Placed This Encounter   Procedures     Basic metabolic panel     N terminal pro BNP outpatient     Hemoglobin     Follow-Up with [facility] - [facility] visit     Orders Placed This Encounter   Medications     furosemide (LASIX) 20 MG tablet     Sig: Take 1 tablet (20 mg) by mouth daily     Dispense:  90 tablet     Refill:  3     lisinopril (PRINIVIL/ZESTRIL) 2.5 MG tablet     Sig: Take 1 tablet (2.5 mg) by mouth daily     Dispense:  90 tablet     Refill:  3     metoprolol succinate (TOPROL-XL) 25 MG 24 hr tablet     Sig: Take 1 tablet (25 mg) by mouth daily     Dispense:  90 tablet     Refill:  3     potassium chloride SA (K-DUR/KLOR-CON M) 20 MEQ CR tablet     Sig: Take 1 tablet (20 mEq) by mouth daily     Dispense:  90 tablet     Refill:  3     Medications Discontinued During This Encounter   Medication Reason     amoxicillin-clavulanate (AUGMENTIN) 875-125 MG per tablet Stopped by Patient     polyethylene glycol (MIRALAX/GLYCOLAX) Packet Stopped by Patient     senna-docusate (SENOKOT-S;PERICOLACE) 8.6-50 MG per tablet Stopped by Patient     nicotine (NICODERM CQ) 14 MG/24HR 24 hr patch      oxyCODONE IR (ROXICODONE) 5 MG tablet      furosemide (LASIX) 20 MG tablet Reorder     lisinopril (PRINIVIL/ZESTRIL) 2.5 MG tablet Reorder     metoprolol succinate (TOPROL-XL) 25 MG 24 hr tablet Reorder     potassium chloride SA (K-DUR/KLOR-CON M) 20 MEQ CR tablet Reorder         Encounter Diagnoses   Name Primary?     Ischemic cardiomyopathy      S/P CABG (coronary artery bypass graft)        CURRENT MEDICATIONS:  Current Outpatient Prescriptions   Medication Sig Dispense Refill      acetaminophen (TYLENOL) 325 MG tablet Take 1-2 tablets (325-650 mg) by mouth every 4 hours as needed for mild pain 100 tablet 0     aspirin 325 MG EC tablet Take 1 tablet (325 mg) by mouth daily 60 tablet 0     atorvastatin (LIPITOR) 40 MG tablet Take 1 tablet (40 mg) by mouth every evening 30 tablet 0     furosemide (LASIX) 20 MG tablet Take 1 tablet (20 mg) by mouth daily 90 tablet 3     lisinopril (PRINIVIL/ZESTRIL) 2.5 MG tablet Take 1 tablet (2.5 mg) by mouth daily 90 tablet 3     loratadine (CLARITIN) 10 MG tablet Take 10 mg by mouth daily       methocarbamol (ROBAXIN) 500 MG tablet Take 1 tablet (500 mg) by mouth 4 times daily as needed for muscle spasms 120 tablet 0     metoprolol succinate (TOPROL-XL) 25 MG 24 hr tablet Take 1 tablet (25 mg) by mouth daily 90 tablet 3     pantoprazole (PROTONIX) 40 MG EC tablet Take 1 tablet (40 mg) by mouth every morning (before breakfast) For 30 days, then discontinue 30 tablet 0     potassium chloride SA (K-DUR/KLOR-CON M) 20 MEQ CR tablet Take 1 tablet (20 mEq) by mouth daily 90 tablet 3     predniSONE (DELTASONE) 10 MG tablet Take 30 mg (3 tabs) PO daily x2 days, then take 20 mg (2 tabs) PO daily x5 days, then take 10 mg (1 tab) PO daily x5 days, then stop 21 tablet 0     [DISCONTINUED] furosemide (LASIX) 20 MG tablet Take 1 tablet (20 mg) by mouth 2 times daily 30 tablet 0     [DISCONTINUED] lisinopril (PRINIVIL/ZESTRIL) 2.5 MG tablet Take 1 tablet (2.5 mg) by mouth daily 30 tablet 0     [DISCONTINUED] metoprolol succinate (TOPROL-XL) 25 MG 24 hr tablet Take 1 tablet (25 mg) by mouth daily 30 tablet 0       ALLERGIES   No Known Allergies    PAST MEDICAL HISTORY:  Past Medical History:   Diagnosis Date     Heart disease      High cholesterol      Hypertension        PAST SURGICAL HISTORY:  Past Surgical History:   Procedure Laterality Date     APPENDECTOMY       BYPASS GRAFT ARTERY CORONARY N/A 11/6/2018    Procedure: CORONARY ARTERY BYPASS GRAFTING x 3 (LIMA - LAD; SV  - OM; SV - PDA) WITH ENDOSCOPIC VEIN HARVEST ON THE LEFT LOWER EXTREMITY    (ON PUMP OXYGENATOR ; BAL BY KEYA)   ;  Surgeon: Sander Wan MD;  Location: [facility] OR     ENT SURGERY      tonsillectomy       FAMILY HISTORY:  Family History   Problem Relation Age of Onset     Coronary Artery Disease Mother      Cerebrovascular Disease Mother      Myocardial Infarction Mother      Family History Negative Father      Unknown/Adopted Maternal Grandmother      Unknown/Adopted Maternal Grandfather      Unknown/Adopted Paternal Grandmother      Unknown/Adopted Paternal Grandfather      Heart Surgery Brother      bypass     Heart Surgery Brother      bypass     Heart Surgery Brother      bypass       SOCIAL HISTORY:  Social History     Social History     Marital status:      Spouse name: N/A     Number of children: N/A     Years of education: N/A     Social History Main Topics     Smoking status: Former Smoker     Start date: 1/1/1965     Quit date: 11/4/2018     Smokeless tobacco: Never Used      Comment: 11/4/18: last cigarette. 1/2 ppd. Off/on for 50 years     Alcohol use No     Drug use: No     Sexual activity: Not Asked     Other Topics Concern     None     Social History Narrative       Review of Systems:  Skin:  Negative     Eyes:  Positive for glasses  ENT:  Negative    Respiratory:  Negative    Cardiovascular:  Negative    Gastroenterology: Negative    Genitourinary:  Negative    Musculoskeletal:  Negative    Neurologic:  Negative    Psychiatric:  Negative    Heme/Lymph/Imm:  Negative    Endocrine:  Negative      Physical Exam:  Vitals: BP (!) 82/48  Pulse 60  Wt 95.2 kg (209 lb 12.8 oz)  BMI 29.26 kg/m2   Please refer to dictation for physical exam    Recent Lab Results:  LIPID RESULTS:  Lab Results   Component Value Date    CHOL 261 (H) 11/05/2018    HDL 36 (L) 11/05/2018     (H) 11/05/2018    TRIG 128 11/05/2018       LIVER ENZYME RESULTS:  Lab Results   Component Value Date    AST 34  11/04/2018    ALT 19 11/04/2018       CBC RESULTS:  Lab Results   Component Value Date    WBC 8.5 11/11/2018    RBC 2.76 (L) 11/11/2018    HGB 9.3 (L) 11/11/2018    HCT 28.3 (L) 11/11/2018     (H) 11/11/2018    MCH 33.7 (H) 11/11/2018    MCHC 32.9 11/11/2018    RDW 12.9 11/11/2018     11/11/2018       BMP RESULTS:  Lab Results   Component Value Date     11/20/2018    POTASSIUM 4.4 11/20/2018    CHLORIDE 106 11/20/2018    CO2 26 11/20/2018    ANIONGAP 8 11/20/2018     (H) 11/20/2018    BUN 24 11/20/2018    CR 0.91 11/20/2018    GFRESTIMATED 82 11/20/2018    GFRESTBLACK >90 11/20/2018    CONOR 9.0 11/20/2018        A1C RESULTS:  Lab Results   Component Value Date    A1C 5.9 (H) 11/06/2018       INR RESULTS:  Lab Results   Component Value Date    INR 1.37 (H) 11/06/2018    INR 1.78 (H) 11/06/2018           CC  No referring provider defined for this encounter.        Thank you for allowing me to participate in the care of your patient.      Sincerely,     Marjorie Monroy PA-C     [facility]    cc:   No referring provider defined for this encounter.

## 2018-11-20 NOTE — LETTER
11/20/2018      Naseem Ramirez MD  [facility] [id number]  [facility] [address]      RE: Genaro Napierinessa       Dear Colleague,    I had the pleasure of seeing David Richard Schoenecker in the [facility].    Service Date: 11/20/2018      PRIMARY CARDIOLOGIST:  Dr. Solares.      REASON FOR VISIT:  C.O.R.E. enrollment.      HISTORY OF PRESENT ILLNESS:  Mr. Schoenecker is a delightful 68-year-old gentleman who had not seen a doctor for about 5 years but had known untreated dyslipidemia, and tobacco use who presented with exertional chest pain and shortness of breath up to and including at rest.  He was seen in the ED and found to have an NSTEMI.  In addition, on workup he was found to have lung nodule as well as mediastinal hilar lymphadenopathy.  He was felt to have a COPD exacerbation/pneumonia and was given antibiotics and prednisone.  He was also found to have an EF of about 30%-35%.  He went on for coronary angiogram that showed severe 3-vessel disease including a 95% thrombotic left main involving the LAD, circ and ramus, 85% distal LAD, 80% mid LAD, 70% diagonal, occluded circumflex and a 70% mid RCA and a 99% distal RCA.  He had a balloon pump placed and was seen by Surgery and underwent CABG.  He had been evaluated by Infectious Disease as well as Pulmonary, and it was felt that his lung nodules will be low risk for malignancy.  He was also found to have bilateral carotid stenoses with the right greater than 70%.  CTA is planned for postop as is followup for his lung nodules.  His postoperative course was fairly unremarkable per notes.  He underwent his 3-vessel CABG on 11/06.  This was a LIMA to the LAD, saphenous vein graft to the OM and saphenous vein graft to the PDA.  It did appear that he had some air leak with his chest tubes.  These were eventually removed without difficulty.  The patient also had a significant tobacco history and, of  course, did not smoke during the hospitalization.      He comes in today for followup of these events and optimization of his medications for C.O.R.E. Clinic.  He overall feels really well.  He is breathing better than he did before he went in the hospital.  He has no orthopnea or PND.  He has not had any chest pain.  He is not needed any narcotic.  He has had just 1 episode where he got up and got dizzy.  Overall, he feels like he is getting stronger every day.  He has not had peripheral edema with the exception of some small amounts in his left leg where he had his vein harvested.  He tells me overall he feels great.      He also tells me that his blood pressure at baseline runs low.      SOCIAL HISTORY:  He comes in today with his daughter, who lives in [place].  He is staying with her while he heals.  He is .  His wife is Eun.  They live in [place] baseline[place].  Again, he was a pack a day smoker, quit in the hospital and no cigarettes since hospitalization.  He has somewhere around a 50-pack-year history.  He does not use any alcohol nor has he ever.      PHYSICAL EXAMINATION:   GENERAL:  Well-developed, well-nourished, pale gentleman in no acute distress.   HEENT:  Normocephalic, atraumatic.   HEART:  Regular.  I do not appreciate murmur, rub or gallop.  Chest incision is clean, dry and intact without erythema.   LUNGS:  Clear without wheezing, rales or rhonchi.   EXTREMITIES:  With just trace edema in his left leg.   SKIN:  Pale.      LABORATORY DATA:  Creatinine 0.91, BUN 24, potassium 4.4, sodium 140.      ASSESSMENT AND PLAN:   1.  Severe 3-vessel coronary artery disease, now status post 3-vessel CAB with LIMA to the LAD, saphenous vein graft to the OM, saphenous vein graft to the PDA.  He is appropriately on aspirin and statin.  We will need to add p.r.n. nitro.   2.  Ischemic cardiomyopathy without signs or symptoms of heart failure.  On his own he was taking  his Lasix just once a day, and this actually seems to have been a corcoran decision as he appears euvolemic on exam.  His neck veins are flat.  His lungs are clear.  He has minimal edema.  He is at fairly high risk for reaccumulating fluid given his heart failure, but given that his blood pressures are soft we will keep him on just 20 mg of Lasix.  In addition, he is only on lisinopril 2.5 mg daily and Toprol 25 mg daily.  At this point, obviously cannot up-titrate them.  Thankfully, this is an asymptomatic hypotension and there are no other signs or symptoms of complication.  We will follow for now.  Currently class II, stage C.   3.  Bilateral carotid artery stenosis.  Due for CTA.  This can be scheduled at [place].   4.  Hyperlipidemia.  Started on Lipitor 40.  Horrible cholesterol when he came in with an LDL of 199, HDL 36, total cholesterol 261.  May need Crestor down the road, but we will try this as he is also doing lifestyle changes at this time.   5.  Tobacco use, now quit.  He was congratulated on his efforts.  Fortunately, he does not drink alcohol.   6.  Right lung nodule with mediastinal hilar lymphadenopathy.  He does need to arrange followup with Pulm.  He is aware of this.  I did ask him to get this all done before he heads to [place] for the winter.      Thank you for allowing me to participate in this delightful patient's care.  I will see him back in 2 weeks here, sooner with concerns.      CONNIE Mcgrath PA-C             D: 2018   T: 2018   MT: ERI      Name:     SCHOENECKER, DAVID   MRN:      [id number]        Account:      [id number]   :      1950           Service Date: 2018      Document: [id number]           Outpatient Encounter Prescriptions as of 2018   Medication Sig Dispense Refill     acetaminophen (TYLENOL) 325 MG tablet Take 1-2 tablets (325-650 mg) by mouth every 4 hours as needed for mild pain (Patient not taking: Reported  on 11/28/2018) 100 tablet 0     aspirin 325 MG EC tablet Take 1 tablet (325 mg) by mouth daily 60 tablet 0     loratadine (CLARITIN) 10 MG tablet Take 10 mg by mouth daily       methocarbamol (ROBAXIN) 500 MG tablet Take 1 tablet (500 mg) by mouth 4 times daily as needed for muscle spasms (Patient not taking: Reported on 11/28/2018) 120 tablet 0     pantoprazole (PROTONIX) 40 MG EC tablet Take 1 tablet (40 mg) by mouth every morning (before breakfast) For 30 days, then discontinue 30 tablet 0     predniSONE (DELTASONE) 10 MG tablet Take 30 mg (3 tabs) PO daily x2 days, then take 20 mg (2 tabs) PO daily x5 days, then take 10 mg (1 tab) PO daily x5 days, then stop 21 tablet 0     [DISCONTINUED] atorvastatin (LIPITOR) 40 MG tablet Take 1 tablet (40 mg) by mouth every evening 30 tablet 0     [DISCONTINUED] furosemide (LASIX) 20 MG tablet Take 1 tablet (20 mg) by mouth daily 90 tablet 3     [DISCONTINUED] lisinopril (PRINIVIL/ZESTRIL) 2.5 MG tablet Take 1 tablet (2.5 mg) by mouth daily 90 tablet 3     [DISCONTINUED] metoprolol succinate (TOPROL-XL) 25 MG 24 hr tablet Take 1 tablet (25 mg) by mouth daily 90 tablet 3     [DISCONTINUED] potassium chloride SA (K-DUR/KLOR-CON M) 20 MEQ CR tablet Take 1 tablet (20 mEq) by mouth daily 90 tablet 3     [DISCONTINUED] amoxicillin-clavulanate (AUGMENTIN) 875-125 MG per tablet Take 1 tablet by mouth every 12 hours 11 tablet 0     [DISCONTINUED] furosemide (LASIX) 20 MG tablet Take 1 tablet (20 mg) by mouth 2 times daily 30 tablet 0     [DISCONTINUED] lisinopril (PRINIVIL/ZESTRIL) 2.5 MG tablet Take 1 tablet (2.5 mg) by mouth daily 30 tablet 0     [DISCONTINUED] metoprolol succinate (TOPROL-XL) 25 MG 24 hr tablet Take 1 tablet (25 mg) by mouth daily 30 tablet 0     [DISCONTINUED] nicotine (NICODERM CQ) 14 MG/24HR 24 hr patch Place 1 patch onto the skin daily (Patient not taking: Reported on 11/20/2018) 30 patch 0     [DISCONTINUED] oxyCODONE IR (ROXICODONE) 5 MG tablet Take 1-2  tablets (5-10 mg) by mouth every 4 hours as needed for moderate to severe pain (Patient not taking: Reported on 11/20/2018) 40 tablet 0     [DISCONTINUED] polyethylene glycol (MIRALAX/GLYCOLAX) Packet Take 17 g by mouth daily (Patient not taking: Reported on 11/20/2018) 7 packet 0     [DISCONTINUED] potassium chloride SA (K-DUR/KLOR-CON M) 20 MEQ CR tablet Take 1 tablet (20 mEq) by mouth daily 15 tablet 0     [DISCONTINUED] senna-docusate (SENOKOT-S;PERICOLACE) 8.6-50 MG per tablet Take 1-2 tablets by mouth 2 times daily as needed for constipation (Patient not taking: Reported on 11/20/2018) 100 tablet 0     No facility-administered encounter medications on file as of 11/20/2018.        Again, thank you for allowing me to participate in the care of your patient.      Sincerely,    Marjorie Monroy PA-C     [facility]

## 2018-11-20 NOTE — PROGRESS NOTES
[id number]  HPI and Plan:   See dictation    Orders this Visit:  Orders Placed This Encounter   Procedures     Basic metabolic panel     N terminal pro BNP outpatient     Hemoglobin     Follow-Up with [facility] - [facility] visit     Orders Placed This Encounter   Medications     furosemide (LASIX) 20 MG tablet     Sig: Take 1 tablet (20 mg) by mouth daily     Dispense:  90 tablet     Refill:  3     lisinopril (PRINIVIL/ZESTRIL) 2.5 MG tablet     Sig: Take 1 tablet (2.5 mg) by mouth daily     Dispense:  90 tablet     Refill:  3     metoprolol succinate (TOPROL-XL) 25 MG 24 hr tablet     Sig: Take 1 tablet (25 mg) by mouth daily     Dispense:  90 tablet     Refill:  3     potassium chloride SA (K-DUR/KLOR-CON M) 20 MEQ CR tablet     Sig: Take 1 tablet (20 mEq) by mouth daily     Dispense:  90 tablet     Refill:  3     Medications Discontinued During This Encounter   Medication Reason     amoxicillin-clavulanate (AUGMENTIN) 875-125 MG per tablet Stopped by Patient     polyethylene glycol (MIRALAX/GLYCOLAX) Packet Stopped by Patient     senna-docusate (SENOKOT-S;PERICOLACE) 8.6-50 MG per tablet Stopped by Patient     nicotine (NICODERM CQ) 14 MG/24HR 24 hr patch      oxyCODONE IR (ROXICODONE) 5 MG tablet      furosemide (LASIX) 20 MG tablet Reorder     lisinopril (PRINIVIL/ZESTRIL) 2.5 MG tablet Reorder     metoprolol succinate (TOPROL-XL) 25 MG 24 hr tablet Reorder     potassium chloride SA (K-DUR/KLOR-CON M) 20 MEQ CR tablet Reorder         Encounter Diagnoses   Name Primary?     Ischemic cardiomyopathy      S/P CABG (coronary artery bypass graft)        CURRENT MEDICATIONS:  Current Outpatient Prescriptions   Medication Sig Dispense Refill     acetaminophen (TYLENOL) 325 MG tablet Take 1-2 tablets (325-650 mg) by mouth every 4 hours as needed for mild pain 100 tablet 0     aspirin 325 MG EC tablet Take 1 tablet (325 mg) by mouth daily 60 tablet 0     atorvastatin (LIPITOR) 40 MG tablet Take 1 tablet (40 mg) by mouth every  evening 30 tablet 0     furosemide (LASIX) 20 MG tablet Take 1 tablet (20 mg) by mouth daily 90 tablet 3     lisinopril (PRINIVIL/ZESTRIL) 2.5 MG tablet Take 1 tablet (2.5 mg) by mouth daily 90 tablet 3     loratadine (CLARITIN) 10 MG tablet Take 10 mg by mouth daily       methocarbamol (ROBAXIN) 500 MG tablet Take 1 tablet (500 mg) by mouth 4 times daily as needed for muscle spasms 120 tablet 0     metoprolol succinate (TOPROL-XL) 25 MG 24 hr tablet Take 1 tablet (25 mg) by mouth daily 90 tablet 3     pantoprazole (PROTONIX) 40 MG EC tablet Take 1 tablet (40 mg) by mouth every morning (before breakfast) For 30 days, then discontinue 30 tablet 0     potassium chloride SA (K-DUR/KLOR-CON M) 20 MEQ CR tablet Take 1 tablet (20 mEq) by mouth daily 90 tablet 3     predniSONE (DELTASONE) 10 MG tablet Take 30 mg (3 tabs) PO daily x2 days, then take 20 mg (2 tabs) PO daily x5 days, then take 10 mg (1 tab) PO daily x5 days, then stop 21 tablet 0     [DISCONTINUED] furosemide (LASIX) 20 MG tablet Take 1 tablet (20 mg) by mouth 2 times daily 30 tablet 0     [DISCONTINUED] lisinopril (PRINIVIL/ZESTRIL) 2.5 MG tablet Take 1 tablet (2.5 mg) by mouth daily 30 tablet 0     [DISCONTINUED] metoprolol succinate (TOPROL-XL) 25 MG 24 hr tablet Take 1 tablet (25 mg) by mouth daily 30 tablet 0       ALLERGIES   No Known Allergies    PAST MEDICAL HISTORY:  Past Medical History:   Diagnosis Date     Heart disease      High cholesterol      Hypertension        PAST SURGICAL HISTORY:  Past Surgical History:   Procedure Laterality Date     APPENDECTOMY       BYPASS GRAFT ARTERY CORONARY N/A 11/6/2018    Procedure: CORONARY ARTERY BYPASS GRAFTING x 3 (LIMA - LAD; SV - OM; SV - PDA) WITH ENDOSCOPIC VEIN HARVEST ON THE LEFT LOWER EXTREMITY    (ON PUMP OXYGENATOR ; BAL BY KEYA)   ;  Surgeon: Sander Wan MD;  Location: [facility] OR     ENT SURGERY      tonsillectomy       FAMILY HISTORY:  Family History   Problem Relation Age of Onset      Coronary Artery Disease Mother      Cerebrovascular Disease Mother      Myocardial Infarction Mother      Family History Negative Father      Unknown/Adopted Maternal Grandmother      Unknown/Adopted Maternal Grandfather      Unknown/Adopted Paternal Grandmother      Unknown/Adopted Paternal Grandfather      Heart Surgery Brother      bypass     Heart Surgery Brother      bypass     Heart Surgery Brother      bypass       SOCIAL HISTORY:  Social History     Social History     Marital status:      Spouse name: N/A     Number of children: N/A     Years of education: N/A     Social History Main Topics     Smoking status: Former Smoker     Start date: 1/1/1965     Quit date: 11/4/2018     Smokeless tobacco: Never Used      Comment: 11/4/18: last cigarette. 1/2 ppd. Off/on for 50 years     Alcohol use No     Drug use: No     Sexual activity: Not Asked     Other Topics Concern     None     Social History Narrative       Review of Systems:  Skin:  Negative     Eyes:  Positive for glasses  ENT:  Negative    Respiratory:  Negative    Cardiovascular:  Negative    Gastroenterology: Negative    Genitourinary:  Negative    Musculoskeletal:  Negative    Neurologic:  Negative    Psychiatric:  Negative    Heme/Lymph/Imm:  Negative    Endocrine:  Negative      Physical Exam:  Vitals: BP (!) 82/48  Pulse 60  Wt 95.2 kg (209 lb 12.8 oz)  BMI 29.26 kg/m2   Please refer to dictation for physical exam    Recent Lab Results:  LIPID RESULTS:  Lab Results   Component Value Date    CHOL 261 (H) 11/05/2018    HDL 36 (L) 11/05/2018     (H) 11/05/2018    TRIG 128 11/05/2018       LIVER ENZYME RESULTS:  Lab Results   Component Value Date    AST 34 11/04/2018    ALT 19 11/04/2018       CBC RESULTS:  Lab Results   Component Value Date    WBC 8.5 11/11/2018    RBC 2.76 (L) 11/11/2018    HGB 9.3 (L) 11/11/2018    HCT 28.3 (L) 11/11/2018     (H) 11/11/2018    MCH 33.7 (H) 11/11/2018    MCHC 32.9 11/11/2018    RDW 12.9  11/11/2018     11/11/2018       BMP RESULTS:  Lab Results   Component Value Date     11/20/2018    POTASSIUM 4.4 11/20/2018    CHLORIDE 106 11/20/2018    CO2 26 11/20/2018    ANIONGAP 8 11/20/2018     (H) 11/20/2018    BUN 24 11/20/2018    CR 0.91 11/20/2018    GFRESTIMATED 82 11/20/2018    GFRESTBLACK >90 11/20/2018    CONOR 9.0 11/20/2018        A1C RESULTS:  Lab Results   Component Value Date    A1C 5.9 (H) 11/06/2018       INR RESULTS:  Lab Results   Component Value Date    INR 1.37 (H) 11/06/2018    INR 1.78 (H) 11/06/2018           CC  No referring provider defined for this encounter.

## 2018-11-20 NOTE — NURSING NOTE
Met w/ Giovanni, his dtr Kaila and grandson after their visit w/ Marjorie More PAC.     Giovanni typically lives in [place], [place]. Currently living w/ Kaila in [place]. Retired .     Reviewed HF folder w/ francois, gave them CORE RN number and stressed to call w/ concerns/questions, ie as listed on HF Stoplight tool.     Giovanni and Kaila verbalized understanding of the information and agreed to call with any questions or concerns.     Labs: Lab results from today were reviewed with the patient during the office visit. A copy of the results were provided on the AVS.      Return appointment: Assisted Giovanni to schedule repeat labs and follow-up w/ Marjorie More PAC per orders-appt info handwritten on AVS.  Cancelled previously scheduled appt w/ Bridgett Bright reported that Giovanni was instructed to follow-up w/ pulm medicine post discharge. We reviewed Dr. Levy's note from 11/12 which confirmed pt is to follow-up in that office. I gave them number to [facility] and asked them to call to arrange follow-up.     Sumaya Romero RN BSN   3:45 PM 11/20/18

## 2018-11-20 NOTE — PATIENT INSTRUCTIONS
Call C[facility] nurse for any questions or concerns:  [phone]   *If you have concerns after hours, please call [phone], option 2 to speak with on call Cardiologist.    Before you leave for [place] you need to have a follow up visit with the lung doctor and get the CT scan of your neck arteries.        1. Medication changes from today:  Continue current medications as you are.       2. Weigh yourself daily and write it down.     3. Call CORE nurse if your weight is up more than 2 pounds in one day or 5 pounds in one week.     4. Call CORE nurse if you feel more short of breath, have more abdominal bloating, or leg swelling.     5. Continue low sodium diet (less than 2000 mg daily). If you eat less salt, you will retain less fluid.     6. Alcohol can weaken your heart further. You should avoid alcohol or limit its use to special times, such as a holiday or birthday.      7. Do NOT take Aleve or ibuprofen without talking to your doctor first.      8. Lab Results: **     [facility]: Cardiomyopathy, Optimization, Rehabilitation, Education  The [facility] is a heart failure specialty clinic within the [facility] where you will work with specialized nurse practitioners, physician assistants, doctors, and registered nurses. They are dedicated to helping patients with heart failure to carefully adjust medications, receive education, and learn who and when to call if symptoms develop. They specialize in helping you better understand your condition, slow the progression of your disease, improve the length and quality of your life, help you detect future heart problems before they become life threatening, and avoid hospitalizations.

## 2018-11-20 NOTE — MR AVS SNAPSHOT
After Visit Summary   11/20/2018    David Richard Schoenecker    MRN: [id number]           Patient Information     Date Of Birth          1950        Visit Information        Provider Department      11/20/2018 1:50 PM More, Marjorie Hagan PA-C [facility]        Today's Diagnoses     Ischemic cardiomyopathy        S/P CABG (coronary artery bypass graft)          Care Instructions    Call [facility] nurse for any questions or concerns:  [phone]   *If you have concerns after hours, please call [phone], option 2 to speak with on call Cardiologist.    Before you leave for [place] you need to have a follow up visit with the lung doctor and get the CT scan of your neck arteries.        1. Medication changes from today:  Continue current medications as you are.       2. Weigh yourself daily and write it down.     3. Call CORE nurse if your weight is up more than 2 pounds in one day or 5 pounds in one week.     4. Call CORE nurse if you feel more short of breath, have more abdominal bloating, or leg swelling.     5. Continue low sodium diet (less than 2000 mg daily). If you eat less salt, you will retain less fluid.     6. Alcohol can weaken your heart further. You should avoid alcohol or limit its use to special times, such as a holiday or birthday.      7. Do NOT take Aleve or ibuprofen without talking to your doctor first.      8. Lab Results: **     [facility]: Cardiomyopathy, Optimization, Rehabilitation, Education  The [facility] is a heart failure specialty clinic within the [facility] where you will work with specialized nurse practitioners, physician assistants, doctors, and registered nurses. They are dedicated to helping patients with heart failure to carefully adjust medications, receive education, and learn who and when to call if symptoms develop. They specialize in helping you better understand your condition, slow the progression of  your disease, improve the length and quality of your life, help you detect future heart problems before they become life threatening, and avoid hospitalizations.              Follow-ups after your visit        Additional Services     Follow-Up with [facility] - LAURITA visit                 Your next 10 appointments already scheduled     Nov 21, 2018 11:00 AM CST   Cardiac Treatment with [facility] ([facility])    [address] [address]   [phone]            Nov 23, 2018 11:00 AM CST   [facility] ([facility])    [address] [address]   [phone]            Nov 26, 2018 11:00 AM CST   [facility] ([facility])    [address] [address]   [phone]            Nov 28, 2018 11:00 AM CST   [facility] ([facility])    [address] [address]   [phone]            Nov 28, 2018  2:00 PM CST   Post-Op with [facility] CARDIOTHORACIC SURGERY, [place]   [facility] ([facility])    [address] [place] [address]   [phone]            Nov 30, 2018 11:00 AM CST   [facility] ([facility])    [address] [address]   [phone]            Dec 03, 2018 11:00 AM CST   [facility] ([facility])    [address] [address]   [phone]            Dec 05, 2018 11:00 AM CST   [facility] ([facility])    [address]  "[address]   [phone]            Dec 07, 2018 11:00 AM CST   [facility], [facility]   [facility] ([facility])    [address] [address]   [phone]            Dec 10, 2018 11:00 AM CST   Cardiac [facility], TH   [facility] ([facility])    [address]  [facility] [address]   [phone]              Future tests that were ordered for you today     Open Future Orders        Priority Expected Expires Ordered    Basic metabolic panel Routine 12/4/2018 11/20/2019 11/20/2018    Follow-Up with [facility] - [facility]UR[facility] visit Routine 12/4/2018 11/20/2019 11/20/2018    N terminal pro BNP outpatient Routine 12/4/2018 11/20/2019 11/20/2018    Hemoglobin Routine 12/4/2018 11/20/2019 11/20/2018            Who to contact     If you have questions or need follow up information about today's clinic visit or your schedule please contact [facility] directly at [phone].  Normal or non-critical lab and imaging results will be communicated to you by MyChart, letter or phone within 4 business days after the clinic has received the results. If you do not hear from us within 7 days, please contact the clinic through [employer] or phone. If you have a critical or abnormal lab result, we will notify you by phone as soon as possible.  Submit refill requests through [employer] or call your pharmacy and they will forward the refill request to us. Please allow 3 business days for your refill to be completed.          Additional Information About Your Visit        [employer] Information     [employer] lets you send messages to your doctor, view your test results, renew your prescriptions, schedule appointments and more. To sign up, go to [email or web address] . Click on \"Log in\" on the left side of the screen, which will take you to the Welcome page. Then click on " "\"Sign up Now\" on the right side of the page.     You will be asked to enter the access code listed below, as well as some personal information. Please follow the directions to create your username and password.     Your access code is: [email or web address]  Expires: 2/10/2019  9:31 AM     Your access code will  in 90 days. If you need help or a new code, please call your [facility] or [phone].        Care EveryWhere ID     This is your Care EveryWhere ID. This could be used by other organizations to access your [place] medical records  [id number]        Your Vitals Were     Pulse BMI (Body Mass Index)                60 29.26 kg/m2           Blood Pressure from Last 3 Encounters:   18 (!) 82/48   18 96/64    Weight from Last 3 Encounters:   18 95.2 kg (209 lb 12.8 oz)   18 100.4 kg (221 lb 5.5 oz)              We Performed the Following     Follow-Up with [facility]          Today's Medication Changes          These changes are accurate as of 18  2:38 PM.  If you have any questions, ask your nurse or doctor.               These medicines have changed or have updated prescriptions.        Dose/Directions    furosemide 20 MG tablet   Commonly known as:  LASIX   This may have changed:  when to take this   Used for:  S/P CABG (coronary artery bypass graft), Ischemic cardiomyopathy   Changed by:  Marjorie Monroy PA-C        Dose:  20 mg   Take 1 tablet (20 mg) by mouth daily   Quantity:  90 tablet   Refills:  3         Stop taking these medicines if you haven't already. Please contact your care team if you have questions.     nicotine 14 MG/24HR 24 hr patch   Commonly known as:  NICODERM CQ   Stopped by:  Marjorie Monroy PA-C           oxyCODONE IR 5 MG tablet   Commonly known as:  ROXICODONE   Stopped by:  Marjorie Monroy PA-C                Where to get your medicines      These medications were sent to [facility]/pharmacy #[id number] - [facility] [address] [facility] [address]  [address] [address], [facility] [address]     Phone:  [phone]     furosemide 20 MG tablet    lisinopril 2.5 MG tablet    metoprolol succinate 25 MG 24 hr tablet    potassium chloride SA 20 MEQ CR tablet                Primary Care Provider Office Phone # Fax #    Naseem Ramirez [phone]       [facility] [address]  [facility] [address]        Equal Access to Services     MERON AMEZCUA : Hadii aad ku hadasho Soomaali, waaxda luqadaha, qaybta kaalmada adeegyada, waxay idiin hayaan adeeg kharash la'aan ah. So [facility] [phone].    ATENCIÓN: Si habla español, tiene a hood disposición servicios gratuitos de asistencia lingüística. [facility] [phone].    We comply with applicable federal civil rights laws and [place] laws. We do not discriminate on the basis of race, color, national origin, age, disability, sex, sexual orientation, or gender identity.            Thank you!     Thank you for choosing [facility]  for your care. Our goal is always to provide you with excellent care. Hearing back from our patients is one way we can continue to improve our services. Please take a few minutes to complete the written survey that you may receive in the mail after your visit with us. Thank you!             Your Updated Medication List - Protect others around you: Learn how to safely use, store and throw away your medicines at www.disposemymeds.org.          This list is accurate as of 11/20/18  2:38 PM.  Always use your most recent med list.                   Brand Name Dispense Instructions for use Diagnosis    acetaminophen 325 MG tablet    TYLENOL    100 tablet    Take 1-2 tablets (325-650 mg) by mouth every 4 hours as needed for mild pain    S/P CABG (coronary artery bypass graft), Acute post-operative pain       aspirin 325 MG EC tablet     60 tablet    Take 1 tablet (325 mg) by mouth daily    S/P CABG (coronary  artery bypass graft)       atorvastatin 40 MG tablet    LIPITOR    30 tablet    Take 1 tablet (40 mg) by mouth every evening    S/P CABG (coronary artery bypass graft)       furosemide 20 MG tablet    LASIX    90 tablet    Take 1 tablet (20 mg) by mouth daily    S/P CABG (coronary artery bypass graft), Ischemic cardiomyopathy       lisinopril 2.5 MG tablet    PRINIVIL/Zestril    90 tablet    Take 1 tablet (2.5 mg) by mouth daily    S/P CABG (coronary artery bypass graft)       loratadine 10 MG tablet    CLARITIN     Take 10 mg by mouth daily        methocarbamol 500 MG tablet    ROBAXIN    120 tablet    Take 1 tablet (500 mg) by mouth 4 times daily as needed for muscle spasms    S/P CABG (coronary artery bypass graft), Acute post-operative pain       metoprolol succinate 25 MG 24 hr tablet    TOPROL-XL    90 tablet    Take 1 tablet (25 mg) by mouth daily    S/P CABG (coronary artery bypass graft)       pantoprazole 40 MG EC tablet    PROTONIX    30 tablet    Take 1 tablet (40 mg) by mouth every morning (before breakfast) For 30 days, then discontinue    S/P CABG (coronary artery bypass graft)       potassium chloride SA 20 MEQ CR tablet    K-DUR/KLOR-CON M    90 tablet    Take 1 tablet (20 mEq) by mouth daily    Ischemic cardiomyopathy, S/P CABG (coronary artery bypass graft)       predniSONE 10 MG tablet    DELTASONE    21 tablet    Take 30 mg (3 tabs) PO daily x2 days, then take 20 mg (2 tabs) PO daily x5 days, then take 10 mg (1 tab) PO daily x5 days, then stop    S/P CABG (coronary artery bypass graft)

## 2018-11-21 ENCOUNTER — HOSPITAL ENCOUNTER (OUTPATIENT)
Dept: CARDIAC REHAB | Facility: CLINIC | Age: 68
End: 2018-11-21
Attending: PHYSICIAN ASSISTANT
Payer: COMMERCIAL

## 2018-11-21 PROCEDURE — 93798 PHYS/QHP OP CAR RHAB W/ECG: CPT

## 2018-11-21 PROCEDURE — 40000116 ZZH STATISTIC OP CR VISIT

## 2018-11-21 NOTE — PROGRESS NOTES
Service Date: 11/20/2018      PRIMARY CARDIOLOGIST:  Dr. Solares.      REASON FOR VISIT:  C.O.R.E. enrollment.      HISTORY OF PRESENT ILLNESS:  Mr. Schoenecker is a delightful 68-year-old gentleman who had not seen a doctor for about 5 years but had known untreated dyslipidemia, and tobacco use who presented with exertional chest pain and shortness of breath up to and including at rest.  He was seen in the ED and found to have an NSTEMI.  In addition, on workup he was found to have lung nodule as well as mediastinal hilar lymphadenopathy.  He was felt to have a COPD exacerbation/pneumonia and was given antibiotics and prednisone.  He was also found to have an EF of about 30%-35%.  He went on for coronary angiogram that showed severe 3-vessel disease including a 95% thrombotic left main involving the LAD, circ and ramus, 85% distal LAD, 80% mid LAD, 70% diagonal, occluded circumflex and a 70% mid RCA and a 99% distal RCA.  He had a balloon pump placed and was seen by Surgery and underwent CABG.  He had been evaluated by Infectious Disease as well as Pulmonary, and it was felt that his lung nodules will be low risk for malignancy.  He was also found to have bilateral carotid stenoses with the right greater than 70%.  CTA is planned for postop as is followup for his lung nodules.  His postoperative course was fairly unremarkable per notes.  He underwent his 3-vessel CABG on 11/06.  This was a LIMA to the LAD, saphenous vein graft to the OM and saphenous vein graft to the PDA.  It did appear that he had some air leak with his chest tubes.  These were eventually removed without difficulty.  The patient also had a significant tobacco history and, of course, did not smoke during the hospitalization.      He comes in today for followup of these events and optimization of his medications for C.O.R.E. Clinic.  He overall feels really well.  He is breathing better than he did before he went in the hospital.  He has no orthopnea  or PND.  He has not had any chest pain.  He is not needed any narcotic.  He has had just 1 episode where he got up and got dizzy.  Overall, he feels like he is getting stronger every day.  He has not had peripheral edema with the exception of some small amounts in his left leg where he had his vein harvested.  He tells me overall he feels great.      He also tells me that his blood pressure at baseline runs low.      SOCIAL HISTORY:  He comes in today with his daughter, who lives in [place].  He is staying with her while he heals.  He is .  His wife is Eun.  They live in [place].  Again, he was a pack a day smoker, quit in the hospital and no cigarettes since hospitalization.  He has somewhere around a 50-pack-year history.  He does not use any alcohol nor has he ever.      PHYSICAL EXAMINATION:   GENERAL:  Well-developed, well-nourished, pale gentleman in no acute distress.   HEENT:  Normocephalic, atraumatic.   HEART:  Regular.  I do not appreciate murmur, rub or gallop.  Chest incision is clean, dry and intact without erythema.   LUNGS:  Clear without wheezing, rales or rhonchi.   EXTREMITIES:  With just trace edema in his left leg.   SKIN:  Pale.      LABORATORY DATA:  Creatinine 0.91, BUN 24, potassium 4.4, sodium 140.      ASSESSMENT AND PLAN:   1.  Severe 3-vessel coronary artery disease, now status post 3-vessel CAB with LIMA to the LAD, saphenous vein graft to the OM, saphenous vein graft to the PDA.  He is appropriately on aspirin and statin.  We will need to add p.r.n. nitro.   2.  Ischemic cardiomyopathy without signs or symptoms of heart failure.  On his own he was taking his Lasix just once a day, and this actually seems to have been a corcoran decision as he appears euvolemic on exam.  His neck veins are flat.  His lungs are clear.  He has minimal edema.  He is at fairly high risk for reaccumulating fluid given his heart failure, but given that his  blood pressures are soft we will keep him on just 20 mg of Lasix.  In addition, he is only on lisinopril 2.5 mg daily and Toprol 25 mg daily.  At this point, obviously cannot up-titrate them.  Thankfully, this is an asymptomatic hypotension and there are no other signs or symptoms of complication.  We will follow for now.  Currently class II, stage C.   3.  Bilateral carotid artery stenosis.  Due for CTA.  This can be scheduled at [place].   4.  Hyperlipidemia.  Started on Lipitor 40.  Horrible cholesterol when he came in with an LDL of 199, HDL 36, total cholesterol 261.  May need Crestor down the road, but we will try this as he is also doing lifestyle changes at this time.   5.  Tobacco use, now quit.  He was congratulated on his efforts.  Fortunately, he does not drink alcohol.   6.  Right lung nodule with mediastinal hilar lymphadenopathy.  He does need to arrange followup with Pulm.  He is aware of this.  I did ask him to get this all done before he heads to [place] for the winter.      Thank you for allowing me to participate in this delightful patient's care.  I will see him back in 2 weeks here, sooner with concerns.      CONNIE Mcgrath PA-C             D: 2018   T: 2018   MT: ERI      Name:     SCHOENECKER, DAVID   MRN:      [id number]        Account:      [id number]   :      1950           Service Date: 2018      Document: [id number]

## 2018-11-23 ENCOUNTER — HOSPITAL ENCOUNTER (OUTPATIENT)
Dept: CT IMAGING | Facility: CLINIC | Age: 68
Discharge: HOME | End: 2018-11-23
Attending: SURGERY | Admitting: SURGERY
Payer: COMMERCIAL

## 2018-11-23 DIAGNOSIS — I65.23 BILATERAL CAROTID ARTERY OCCLUSION: ICD-10-CM

## 2018-11-23 PROCEDURE — 25000125 ZZHC RX 250: Performed by: RADIOLOGY

## 2018-11-23 PROCEDURE — 25000128 H RX IP 250 OP 636: Performed by: RADIOLOGY

## 2018-11-23 PROCEDURE — 70498 CT ANGIOGRAPHY NECK: CPT

## 2018-11-23 RX ORDER — IOPAMIDOL 755 MG/ML
70 INJECTION, SOLUTION INTRAVASCULAR ONCE
Status: COMPLETED | OUTPATIENT
Start: 2018-11-23 | End: 2018-11-23

## 2018-11-23 RX ORDER — IOPAMIDOL 755 MG/ML
70 INJECTION, SOLUTION INTRAVASCULAR ONCE
Status: DISCONTINUED | OUTPATIENT
Start: 2018-11-23 | End: 2018-11-23

## 2018-11-23 RX ADMIN — IOPAMIDOL 70 ML: 755 INJECTION, SOLUTION INTRAVENOUS at 12:30

## 2018-11-23 RX ADMIN — SODIUM CHLORIDE 100 ML: 9 INJECTION, SOLUTION INTRAVENOUS at 12:30

## 2018-11-26 ENCOUNTER — HOSPITAL ENCOUNTER (OUTPATIENT)
Dept: CARDIAC REHAB | Facility: CLINIC | Age: 68
End: 2018-11-26
Attending: PHYSICIAN ASSISTANT
Payer: COMMERCIAL

## 2018-11-26 PROCEDURE — 40000116 ZZH STATISTIC OP CR VISIT

## 2018-11-26 PROCEDURE — 93798 PHYS/QHP OP CAR RHAB W/ECG: CPT

## 2018-11-27 DIAGNOSIS — I65.23 BILATERAL CAROTID ARTERY OCCLUSION: Primary | ICD-10-CM

## 2018-11-28 ENCOUNTER — HOSPITAL ENCOUNTER (OUTPATIENT)
Dept: CARDIAC REHAB | Facility: CLINIC | Age: 68
End: 2018-11-28
Attending: PHYSICIAN ASSISTANT
Payer: COMMERCIAL

## 2018-11-28 ENCOUNTER — OFFICE VISIT (OUTPATIENT)
Dept: CARDIOLOGY | Facility: CLINIC | Age: 68
End: 2018-11-28
Payer: COMMERCIAL

## 2018-11-28 VITALS
DIASTOLIC BLOOD PRESSURE: 60 MMHG | HEART RATE: 74 BPM | SYSTOLIC BLOOD PRESSURE: 120 MMHG | BODY MASS INDEX: 29.15 KG/M2 | WEIGHT: 209 LBS

## 2018-11-28 DIAGNOSIS — I25.5 ISCHEMIC CARDIOMYOPATHY: ICD-10-CM

## 2018-11-28 DIAGNOSIS — Z95.1 S/P CABG (CORONARY ARTERY BYPASS GRAFT): Primary | ICD-10-CM

## 2018-11-28 PROCEDURE — 40000116 ZZH STATISTIC OP CR VISIT

## 2018-11-28 PROCEDURE — 93798 PHYS/QHP OP CAR RHAB W/ECG: CPT

## 2018-11-28 RX ORDER — POTASSIUM CHLORIDE 1500 MG/1
20 TABLET, EXTENDED RELEASE ORAL DAILY
Qty: 90 TABLET | Refills: 3 | Status: SHIPPED | OUTPATIENT
Start: 2018-11-28 | End: 2018-12-05

## 2018-11-28 RX ORDER — ATORVASTATIN CALCIUM 40 MG/1
40 TABLET, FILM COATED ORAL EVERY EVENING
Qty: 30 TABLET | Refills: 3 | Status: SHIPPED | OUTPATIENT
Start: 2018-11-28 | End: 2018-12-17

## 2018-11-28 RX ORDER — LISINOPRIL 2.5 MG/1
2.5 TABLET ORAL DAILY
Qty: 90 TABLET | Refills: 3 | Status: SHIPPED | OUTPATIENT
Start: 2018-11-28 | End: 2019-12-03

## 2018-11-28 RX ORDER — METOPROLOL SUCCINATE 25 MG/1
25 TABLET, EXTENDED RELEASE ORAL DAILY
Qty: 90 TABLET | Refills: 3 | Status: SHIPPED | OUTPATIENT
Start: 2018-11-28 | End: 2019-12-02

## 2018-11-28 RX ORDER — FUROSEMIDE 20 MG
20 TABLET ORAL DAILY
Qty: 90 TABLET | Refills: 3 | Status: SHIPPED | OUTPATIENT
Start: 2018-11-28 | End: 2019-06-24 | Stop reason: ALTCHOICE

## 2018-11-28 NOTE — MR AVS SNAPSHOT
After Visit Summary   11/28/2018    David Richard Schoenecker    MRN: [id number]           Patient Information     Date Of Birth          1950        Visit Information        Provider Department      11/28/2018 2:00 PM [facility] CARDIOTHORACIC SURGERY, PA [facility] Cardiothoracic        Today's Diagnoses     S/P CABG (coronary artery bypass graft)    -  1    Ischemic cardiomyopathy           Follow-ups after your visit        Your next 10 appointments already scheduled     Nov 30, 2018 11:00 AM CST   Cardiac Treatment with [facility] ([facility])    [address] [address]   [phone]            Dec 03, 2018 11:00 AM CST   Cardiac Treatment with [facility] ([facility])    [address] [address]   [phone]            Dec 05, 2018 11:30 AM CST   LAB with [facility]   [facility] HEART [facility] ([facility])    [address] [place] [address]   [phone]           Please do not eat 10-12 hours before your appointment if you are coming in fasting for labs on lipids, cholesterol, or glucose (sugar). This does not apply to pregnant women. Water, hot tea and black coffee (with nothing added) are okay. Do not drink other fluids, diet soda or chew gum.            Dec 05, 2018 12:30 PM CST   Core Return with Marjorie Monroy PA-C   [facility] Heart [facility] ([facility])    [address] [place] [address]   [phone] OPT 2            Dec 07, 2018 11:00 AM CST   Cardiac Treatment with [facility] ([facility])    [address] [address]   [phone]            Dec 10, 2018 11:00 AM CST   [facility]iac [facility] with [facility]  Cardiac Rehab ([facility])    [address] [address]   [phone]            Dec 12, 2018 11:00 AM CST   Cardiac Treatment with [facility], TH   [facility] ([facility])    [address] [address]   [phone]            Dec 14, 2018 11:00 AM CST   Cardiac Treatment [facility], [facility]   [facility] ([facility])    [address] [address]   [phone]            Dec 17, 2018 11:00 AM CST   Cardiac Treatment with [facility], [facility]   [facility] ([facility])    [address] [address]   [phone]            Dec 19, 2018 11:00 AM CST   Cardiac [facility] ([facility])    [address] [address]   [phone]              Future tests that were ordered for you today     Open Future Orders        Priority Expected Expires Ordered    CT Chest w/o Contrast Routine  11/27/2019 11/27/2018    CTA Head Neck with Contrast Routine  11/27/2019 11/27/2018            Who to contact     Please call your clinic at [phone] to:    Ask questions about your health    Make or cancel appointments    Discuss your medicines    Learn about your test results    Speak to your doctor            Additional Information About Your Visit        Swyzzlehart Information     Brigade is an electronic gateway that provides easy, online access to your medical records. With Brigade, you can request a clinic appointment, read your test results, renew a prescription or communicate with your care team.     To sign up for Brigade visit the website at [email or web address]   You will be asked to enter the access code listed below, as well as some personal information. Please follow the directions to create your username and password.      Your access code is: [email or web address]  Expires: 2/10/2019  9:31 AM     Your access code will  in 90 days. If you need help or a new code, please contact your [facility] or call [phone] for assistance.        Care EveryWhere ID     This is your Care EveryWhere ID. This could be used by other organizations to access your [place] medical records  [id number]        Your Vitals Were     Pulse BMI (Body Mass Index)                74 29.15 kg/m2           Blood Pressure from Last 3 Encounters:   18 120/60   18 (!) 82/48   18 96/64    Weight from Last 3 Encounters:   18 94.8 kg (209 lb)   18 95.2 kg (209 lb 12.8 oz)   18 100.4 kg (221 lb 5.5 oz)              Today, you had the following     No orders found for display         Where to get your medicines      These medications were sent to [facility]/pharmacy #[id number] - [place] [address]  [address] [place] [address]     Phone:  [phone]     atorvastatin 40 MG tablet    furosemide 20 MG tablet    lisinopril 2.5 MG tablet    metoprolol succinate 25 MG 24 hr tablet    potassium chloride ER 20 MEQ CR tablet          Primary Care Provider Office Phone # Fax #    Naseem Ramriez [phone]       [facility] [id number]  [facility] [address]        Equal Access to Services     MERON AMEZCUA AH: Hadii aad ku hadasho Soomaali, waaxda luqadaha, qaybta kaalmada adeegyada, waxlamar rajesh boles. So [facility] [phone].    ATENCIÓN: Si habla español, tiene a hood disposición servicios gratuitos de asistencia lingüística. Llame al [phone].    We comply with applicable federal civil rights laws and [place] laws. We do not discriminate on the basis of race, color, national origin, age, disability, sex, sexual orientation, or gender identity.            Thank you!     Thank you for choosing [facility] CARDIOTHORACIC  for your  care. Our goal is always to provide you with excellent care. Hearing back from our patients is one way we can continue to improve our services. Please take a few minutes to complete the written survey that you may receive in the mail after your visit with us. Thank you!             Your Updated Medication List - Protect others around you: Learn how to safely use, store and throw away your medicines at www.disposemymeds.org.          This list is accurate as of 11/28/18  3:33 PM.  Always use your most recent med list.                   Brand Name Dispense Instructions for use Diagnosis    acetaminophen 325 MG tablet    TYLENOL    100 tablet    Take 1-2 tablets (325-650 mg) by mouth every 4 hours as needed for mild pain    S/P CABG (coronary artery bypass graft), Acute post-operative pain       aspirin 325 MG EC tablet    ASA    60 tablet    Take 1 tablet (325 mg) by mouth daily    S/P CABG (coronary artery bypass graft)       atorvastatin 40 MG tablet    LIPITOR    30 tablet    Take 1 tablet (40 mg) by mouth every evening    S/P CABG (coronary artery bypass graft)       furosemide 20 MG tablet    LASIX    90 tablet    Take 1 tablet (20 mg) by mouth daily    S/P CABG (coronary artery bypass graft), Ischemic cardiomyopathy       lisinopril 2.5 MG tablet    PRINIVIL/Zestril    90 tablet    Take 1 tablet (2.5 mg) by mouth daily    S/P CABG (coronary artery bypass graft)       loratadine 10 MG tablet    CLARITIN     Take 10 mg by mouth daily        methocarbamol 500 MG tablet    ROBAXIN    120 tablet    Take 1 tablet (500 mg) by mouth 4 times daily as needed for muscle spasms    S/P CABG (coronary artery bypass graft), Acute post-operative pain       metoprolol succinate 25 MG 24 hr tablet    TOPROL-XL    90 tablet    Take 1 tablet (25 mg) by mouth daily    S/P CABG (coronary artery bypass graft)       pantoprazole 40 MG EC tablet    PROTONIX    30 tablet    Take 1 tablet (40 mg) by mouth every morning (before breakfast)  For 30 days, then discontinue    S/P CABG (coronary artery bypass graft)       potassium chloride ER 20 MEQ CR tablet    K-DUR/KLOR-CON M    90 tablet    Take 1 tablet (20 mEq) by mouth daily    Ischemic cardiomyopathy, S/P CABG (coronary artery bypass graft)       predniSONE 10 MG tablet    DELTASONE    21 tablet    Take 30 mg (3 tabs) PO daily x2 days, then take 20 mg (2 tabs) PO daily x5 days, then take 10 mg (1 tab) PO daily x5 days, then stop    S/P CABG (coronary artery bypass graft)

## 2018-11-28 NOTE — PROGRESS NOTES
CV Surgery Post Op Follow Up Note:     S:  Per Discharge Summary:    David Richard Schoenecker is a 68 year old male who on 11/6/2018 underwent the above-named procedures.  Patient tolerated the operation well and was admitted to the CVICU post-operatively.  Patient was extubated on POD # 1. IABP was removed without incident. Pressors were weaned off as able. Patient's ICU stay was unremarkable. Patient was transferred to the surgical floor on POD # 2. Chest tubes were watersealed, then clamped due to airleak and tidaling, CXR without significant pneumothorax, then tubes were removed. Follow-up CXR was negative for any significant pneumothorax. TPW were removed when appropriate.     Patient continued to improve post-operatively. Patient was started on  mg daily, atorvastatin 40 mg daily, Toprol XL 25 mg daily and lisinopril 2.5 mg daily. Patient remained hemodynamically stable. Patient was diuresed with lasix 20 mg IV BID and transitioned to 20 mg PO BID with K replacement. He will follow-up with [facility] clinic in 1 week for heart failure follow-up.      Pre-operatively, patient was diagnosed with community acquired pneumonia and was started on ceftriaxone. He was treated with 1 week course of ceftriaxone and will be discharged on one week course of Augmentin. He was also diagnosed with a likely COPD exacerbation. He will be discharged on a steroid taper over the next 2 weeks.     Patient's carotid US found greater than 70% right carotid artery stenosis. Cards recommending outpatient CTA neck to evaluate this further.      Post-operative pain control included IV dilaudid, PO oxycodone, robaxin and tylenol and will be discharged on oxycodone, robaxin and tylenol.      Prior to discharge, patient's pain was controlled well, he was able to perform most ADLs and ambulate without difficulty, and had full return of bowel and bladder function.  On November 12, 2018, he was Discharged to home in stable  condition.    Since discharge, patient has been recovering well at his daughters home in [place]. He denies pain and has not been using narcotics, robaxin or tylenol. He reports occasional headaches for which he would like to take ibuprofen. He has been weighing himself daily and his weights are stable. He has stopped using his IS but reports the volumes were improving and he does not have any issues breathing. He has been attending cardiac rehab and that is going well. He has seen Marjorie Monroy NP with [facility] clinic and his lasix was reduced to once daily which seems to be appropriate for him. His medications were reviewed and he reports that he does not have any refills available for his lasix, lisinopril, atorvastatin or metoprolol.     Allergies: No Known Allergies    Medications:   Current Outpatient Prescriptions:      acetaminophen (TYLENOL) 325 MG tablet, Take 1-2 tablets (325-650 mg) by mouth every 4 hours as needed for mild pain, Disp: 100 tablet, Rfl: 0     aspirin 325 MG EC tablet, Take 1 tablet (325 mg) by mouth daily, Disp: 60 tablet, Rfl: 0     atorvastatin (LIPITOR) 40 MG tablet, Take 1 tablet (40 mg) by mouth every evening, Disp: 30 tablet, Rfl: 0     furosemide (LASIX) 20 MG tablet, Take 1 tablet (20 mg) by mouth daily, Disp: 90 tablet, Rfl: 3     lisinopril (PRINIVIL/ZESTRIL) 2.5 MG tablet, Take 1 tablet (2.5 mg) by mouth daily, Disp: 90 tablet, Rfl: 3     loratadine (CLARITIN) 10 MG tablet, Take 10 mg by mouth daily, Disp: , Rfl:      methocarbamol (ROBAXIN) 500 MG tablet, Take 1 tablet (500 mg) by mouth 4 times daily as needed for muscle spasms, Disp: 120 tablet, Rfl: 0     metoprolol succinate (TOPROL-XL) 25 MG 24 hr tablet, Take 1 tablet (25 mg) by mouth daily, Disp: 90 tablet, Rfl: 3     pantoprazole (PROTONIX) 40 MG EC tablet, Take 1 tablet (40 mg) by mouth every morning (before breakfast) For 30 days, then discontinue, Disp: 30 tablet, Rfl: 0     potassium chloride SA (K-DUR/KLOR-CON M) 20 MEQ  CR tablet, Take 1 tablet (20 mEq) by mouth daily, Disp: 90 tablet, Rfl: 3     predniSONE (DELTASONE) 10 MG tablet, Take 30 mg (3 tabs) PO daily x2 days, then take 20 mg (2 tabs) PO daily x5 days, then take 10 mg (1 tab) PO daily x5 days, then stop, Disp: 21 tablet, Rfl: 0    Physical Exam: Exam:  /60  Pulse 74  Wt 94.8 kg (209 lb)  BMI 29.15 kg/m2  Constitutional: healthy, alert and no distress  Cardiovascular: RRR. No murmurs, clicks gallops or rub  Respiratory: good diaphragmatic excursion. Lungs clear  Gastrointestinal: Abdomen soft, non-tender  : Deferred  Musculoskeletal: extremities normal- no gross deformities noted, gait normal and normal muscle tone  Skin: no suspicious lesions or rashes  Neurologic: Gait normal.   Psychiatric: mentation appears normal and affect normal/bright  Incisions: CDI    Assessment/Plan:     Refills were electronically sent to his preferred pharmacy  Discussed occasional ibuprofen at this point from surgery should be ok  Discussed results of CT neck-- carotid stenosis 60% right ICA stenosis, 30% left ICA stenosis. No further need for vascular surgery consultation at this time. Will defer to PCP regarding further follow up of this  Follow up with Marjorie Monroy NP as scheduled next week with pre clinic labs.   Discuss with Marjorie regarding post op follow up with cardiology-- ideally would follow up in 2-3 months from surgery but pt will be in [place] after [place] until about April.   Continue cardiac rehab  Discussed normal expectations following surgery  Discussed timeline for restrictions  Follow up with PCP as scheduled  Follow up with pulmonology as scheduled      All of the patient's questions were answered. Our contact information was given to him/her if they should have any further questions/concerns. No further follow-up is needed with us.      Bita Julio PA-C  CV Surgery  [facility]  Pager: [phone]

## 2018-11-30 ENCOUNTER — HOSPITAL ENCOUNTER (OUTPATIENT)
Dept: CARDIAC REHAB | Facility: CLINIC | Age: 68
End: 2018-11-30
Attending: PHYSICIAN ASSISTANT
Payer: COMMERCIAL

## 2018-11-30 PROCEDURE — 40000116 ZZH STATISTIC OP CR VISIT

## 2018-11-30 PROCEDURE — 93798 PHYS/QHP OP CAR RHAB W/ECG: CPT

## 2018-12-03 ENCOUNTER — HOSPITAL ENCOUNTER (OUTPATIENT)
Dept: CARDIAC REHAB | Facility: CLINIC | Age: 68
End: 2018-12-03
Attending: PHYSICIAN ASSISTANT
Payer: COMMERCIAL

## 2018-12-03 PROCEDURE — 93798 PHYS/QHP OP CAR RHAB W/ECG: CPT

## 2018-12-03 PROCEDURE — 40000116 ZZH STATISTIC OP CR VISIT

## 2018-12-05 ENCOUNTER — OFFICE VISIT (OUTPATIENT)
Dept: CARDIOLOGY | Facility: CLINIC | Age: 68
End: 2018-12-05
Payer: COMMERCIAL

## 2018-12-05 VITALS
WEIGHT: 210 LBS | HEIGHT: 71 IN | DIASTOLIC BLOOD PRESSURE: 50 MMHG | BODY MASS INDEX: 29.4 KG/M2 | SYSTOLIC BLOOD PRESSURE: 84 MMHG | HEART RATE: 72 BPM

## 2018-12-05 DIAGNOSIS — I25.5 ISCHEMIC CARDIOMYOPATHY: ICD-10-CM

## 2018-12-05 LAB
ANION GAP SERPL CALCULATED.3IONS-SCNC: 5.9 MMOL/L (ref 6–17)
BUN SERPL-MCNC: 22 MG/DL (ref 7–30)
CALCIUM SERPL-MCNC: 9.7 MG/DL (ref 8.5–10.5)
CHLORIDE SERPL-SCNC: 102 MMOL/L (ref 98–107)
CO2 SERPL-SCNC: 29 MMOL/L (ref 23–29)
CREAT SERPL-MCNC: 1.15 MG/DL (ref 0.7–1.3)
GFR SERPL CREATININE-BSD FRML MDRD: 63 ML/MIN/1.7M2
GLUCOSE SERPL-MCNC: 99 MG/DL (ref 70–105)
HGB BLD-MCNC: 10.4 G/DL (ref 13.3–17.7)
NT-PROBNP SERPL-MCNC: 1984 PG/ML (ref 0–125)
POTASSIUM SERPL-SCNC: 3.9 MMOL/L (ref 3.5–5.1)
SODIUM SERPL-SCNC: 133 MMOL/L (ref 136–145)

## 2018-12-05 PROCEDURE — 83880 ASSAY OF NATRIURETIC PEPTIDE: CPT | Performed by: INTERNAL MEDICINE

## 2018-12-05 PROCEDURE — 36415 COLL VENOUS BLD VENIPUNCTURE: CPT | Performed by: INTERNAL MEDICINE

## 2018-12-05 PROCEDURE — 99214 OFFICE O/P EST MOD 30 MIN: CPT | Performed by: PHYSICIAN ASSISTANT

## 2018-12-05 PROCEDURE — 80048 BASIC METABOLIC PNL TOTAL CA: CPT | Performed by: INTERNAL MEDICINE

## 2018-12-05 PROCEDURE — 85018 HEMOGLOBIN: CPT | Performed by: INTERNAL MEDICINE

## 2018-12-05 NOTE — LETTER
12/5/2018      Naseem Ramirez MD  [facility] [id number]  [facility] [address]      RE: Genaro Whaley Schoenecker       Dear Colleague,    I had the pleasure of seeing David Richard Schoenecker in the [facility].    Service Date: 12/05/2018      CLINIC VISIT        PRIMARY CARDIOLOGIST:   *** but will be following in [place].  Likely, will follow with Dr. Beatty or Dr. Pretty      REASON FOR VISIT:  [facility] Clinic followup.      HISTORY OF PRESENT ILLNESS:  Mr. Schoenecker is a delightful 68-year-old gentleman who had not seen a doctor for about 5 years and had untreated dyslipidemia and tobacco abuse who presented with exertional chest pain and shortness of breath at rest.  He went to the ED and was found to have an NSTEMI.  He had a lung nodule and mediastinal hilar lymphadenopathy on workup.  EF was found to be down at 30%-35% and he eventually went to coronary angiogram showing severe 3-vessel disease including a 95% thrombotic left main involving the LAD, circumflex, ramus and 85% distal LAD, 80% mid LAD, 70% diagonal, an occluded circ, a 70% mid RCA and a 99% distal RCA.  He also was treated for COPD exacerbation.  He required a balloon pump and did undergo CABG on 11/06/2018.  This was a 3-vessel CAB.  This was a LIMA to the LAD, saphenous vein graft to the OM and saphenous vein graft to the PDA.  Before this, he had been evaluated both by Infectious Disease and Pulmonary, as he had been found to have a lung nodule.  These were felt to be low risk.  He also was found to have bilateral carotid stenoses.  These were managed conservatively for the time being.      I met him several weeks ago and he was doing okay in spite of being hypotensive.  Today, he says he continues to get better.  He denies chest pain, shortness of breath, orthopnea or PND.  He is able to sleep flat now and is breathing fine.  He has not had syncope or presyncope.  He did not   his potassium, so he has been off that for about a week but he has been taking his Lasix.  He did not note any increased urination.  His wife continues to think he is getting better as well.      SOCIAL HISTORY:  He comes in today with his wife, Eun.  They are staying with their daughter in [place] until they head to [place] which they typically do every winter.  They typically leave after [place] and return in early May.  He was a pack-a-day smoker, about a 50-pack-year history.  He is a nondrinker, never has.      PHYSICAL EXAMINATION:   GENERAL:  Well-developed, well-nourished gentleman in no acute distress, slightly pale.   HEENT:  Normocephalic, atraumatic.   HEART:  Regular in rate and rhythm.  I do not appreciate murmur, rub or gallop.   LUNGS:  Clear today without wheezes, rales or rhonchi.   EXTREMITIES:  With trace peripheral edema to mid shin.   SKIN:  Warm and dry.   NECK:  Veins are flat at 30 degrees.      LABORATORIES:  Today show creatinine 1.15, BUN 22, potassium 3.9, sodium 133.      ASSESSMENT AND PLAN:   1.  Coronary artery disease status post 3-vessel CAB with LIMA to the LAD, saphenous vein graft to the OM and saphenous vein graft to the PDA.  Appropriately on aspirin and statin.   2.  Ischemic cardiomyopathy without symptoms of heart failure with weight stable around 209-210 pounds.  We will continue him on Lasix 20 mg daily, in part because his EF is still low.  I think he is very high risk for re-accumulating volume.  Second, he is not doing perfect on his sodium and third, he has some trace peripheral edema.  I am hoping we will be able to discontinue this at the next visit.  We will continue his lisinopril at 2.5 mg daily and Toprol at 25 mg daily.  I am not able to up-titrate further due to hypotension.  This is asymptomatic.   3.  Bilateral carotid artery stenosis, per primary.   4.  Hyperlipidemia.  Horrible cholesterol with  on admission.  We will repeat in  about 3 months.   5.  Tobacco abuse, now quit.  Congratulated on efforts.   6.  Right lung nodule, mediastinal hilar lymphadenopathy.  Followup is arranged for next week.      I will see him back with a limited echo prior in about 2 weeks before he heads to [place].  I would like to know his EF and see what kind of risk we have before he travels for possible sudden cardiac death versus ICD.      Thank you for allowing me to participate in this delightful patient's care.      CONNIE Mcgrath PA-C             D: 2018   T: 2018   MT: JACKELINE      Name:     SCHOENECKER, DAVID   MRN:      [id number]        Account:      [id number]   :      1950           Service Date: 2018      Document: [id number]         Outpatient Encounter Medications as of 2018   Medication Sig Dispense Refill     acetaminophen (TYLENOL) 325 MG tablet Take 1-2 tablets (325-650 mg) by mouth every 4 hours as needed for mild pain 100 tablet 0     aspirin 325 MG EC tablet Take 1 tablet (325 mg) by mouth daily 60 tablet 0     atorvastatin (LIPITOR) 40 MG tablet Take 1 tablet (40 mg) by mouth every evening 30 tablet 3     furosemide (LASIX) 20 MG tablet Take 1 tablet (20 mg) by mouth daily 90 tablet 3     lisinopril (PRINIVIL/ZESTRIL) 2.5 MG tablet Take 1 tablet (2.5 mg) by mouth daily 90 tablet 3     loratadine (CLARITIN) 10 MG tablet Take 10 mg by mouth daily       metoprolol succinate (TOPROL-XL) 25 MG 24 hr tablet Take 1 tablet (25 mg) by mouth daily 90 tablet 3     [DISCONTINUED] methocarbamol (ROBAXIN) 500 MG tablet Take 1 tablet (500 mg) by mouth 4 times daily as needed for muscle spasms (Patient not taking: Reported on 2018) 120 tablet 0     [DISCONTINUED] pantoprazole (PROTONIX) 40 MG EC tablet Take 1 tablet (40 mg) by mouth every morning (before breakfast) For 30 days, then discontinue (Patient not taking: Reported on 2018) 30 tablet 0     [DISCONTINUED] potassium chloride ER  (K-DUR/KLOR-CON M) 20 MEQ CR tablet Take 1 tablet (20 mEq) by mouth daily 90 tablet 3     [DISCONTINUED] predniSONE (DELTASONE) 10 MG tablet Take 30 mg (3 tabs) PO daily x2 days, then take 20 mg (2 tabs) PO daily x5 days, then take 10 mg (1 tab) PO daily x5 days, then stop 21 tablet 0     No facility-administered encounter medications on file as of 12/5/2018.        Again, thank you for allowing me to participate in the care of your patient.      Sincerely,    Marjorie Monroy PA-C     [facility]

## 2018-12-05 NOTE — LETTER
12/5/2018    Naseem Ramirez MD  [facility] [address]  [id number]  [facility] [address]    RE: David Richard Schoenecker       Dear Colleague,    I had the pleasure of seeing David Richard Schoenecker in the [facility].    [id number]  HPI and Plan:   See dictation    Orders this Visit:  Orders Placed This Encounter   Procedures     Basic metabolic panel     Follow-Up with [facility] - [facility] visit     Echocardiogram Limited     No orders of the defined types were placed in this encounter.    Medications Discontinued During This Encounter   Medication Reason     predniSONE (DELTASONE) 10 MG tablet Therapy completed     methocarbamol (ROBAXIN) 500 MG tablet      methocarbamol (ROBAXIN) 500 MG tablet      pantoprazole (PROTONIX) 40 MG EC tablet      potassium chloride ER (K-DUR/KLOR-CON M) 20 MEQ CR tablet          Encounter Diagnosis   Name Primary?     Ischemic cardiomyopathy        CURRENT MEDICATIONS:  Current Outpatient Prescriptions   Medication Sig Dispense Refill     acetaminophen (TYLENOL) 325 MG tablet Take 1-2 tablets (325-650 mg) by mouth every 4 hours as needed for mild pain 100 tablet 0     aspirin 325 MG EC tablet Take 1 tablet (325 mg) by mouth daily 60 tablet 0     atorvastatin (LIPITOR) 40 MG tablet Take 1 tablet (40 mg) by mouth every evening 30 tablet 3     furosemide (LASIX) 20 MG tablet Take 1 tablet (20 mg) by mouth daily 90 tablet 3     lisinopril (PRINIVIL/ZESTRIL) 2.5 MG tablet Take 1 tablet (2.5 mg) by mouth daily 90 tablet 3     loratadine (CLARITIN) 10 MG tablet Take 10 mg by mouth daily       metoprolol succinate (TOPROL-XL) 25 MG 24 hr tablet Take 1 tablet (25 mg) by mouth daily 90 tablet 3       ALLERGIES   No Known Allergies    PAST MEDICAL HISTORY:  Past Medical History:   Diagnosis Date     Heart disease      High cholesterol      Hypertension        PAST SURGICAL HISTORY:  Past Surgical History:   Procedure Laterality Date     APPENDECTOMY   "     BYPASS GRAFT ARTERY CORONARY N/A 11/6/2018    Procedure: CORONARY ARTERY BYPASS GRAFTING x 3 (LIMA - LAD; SV - OM; SV - PDA) WITH ENDOSCOPIC VEIN HARVEST ON THE LEFT LOWER EXTREMITY    (ON PUMP OXYGENATOR ; BAL BY KEYA)   ;  Surgeon: Sander Wan MD;  Location:  OR     ENT SURGERY      tonsillectomy       FAMILY HISTORY:  Family History   Problem Relation Age of Onset     Coronary Artery Disease Mother      Cerebrovascular Disease Mother      Myocardial Infarction Mother      Family History Negative Father      Unknown/Adopted Maternal Grandmother      Unknown/Adopted Maternal Grandfather      Unknown/Adopted Paternal Grandmother      Unknown/Adopted Paternal Grandfather      Heart Surgery Brother      bypass     Heart Surgery Brother      bypass     Heart Surgery Brother      bypass       SOCIAL HISTORY:  Social History     Social History     Marital status:      Spouse name: N/A     Number of children: N/A     Years of education: N/A     Social History Main Topics     Smoking status: Former Smoker     Start date: 1/1/1965     Quit date: 11/4/2018     Smokeless tobacco: Never Used      Comment: 11/4/18: last cigarette. 1/2 ppd. Off/on for 50 years     Alcohol use No     Drug use: No     Sexual activity: Not Asked     Other Topics Concern     None     Social History Narrative       Review of Systems:  Skin:  Negative     Eyes:  Positive for glasses  ENT:  Negative    Respiratory:  Negative    Cardiovascular:  Negative    Gastroenterology: Negative    Genitourinary:  Negative    Musculoskeletal:  Negative    Neurologic:  Negative    Psychiatric:  Negative    Heme/Lymph/Imm:  Negative    Endocrine:  Negative      Physical Exam:  Vitals: BP (!) 84/50  Pulse 72  Ht 1.803 m (5' 11\")  Wt 95.3 kg (210 lb)  BMI 29.29 kg/m2   Please refer to dictation for physical exam    Recent Lab Results:  LIPID RESULTS:  Lab Results   Component Value Date    CHOL 261 (H) 11/05/2018    HDL 36 (L) 11/05/2018    "  (H) 11/05/2018    TRIG 128 11/05/2018       LIVER ENZYME RESULTS:  Lab Results   Component Value Date    AST 34 11/04/2018    ALT 19 11/04/2018       CBC RESULTS:  Lab Results   Component Value Date    WBC 8.5 11/11/2018    RBC 2.76 (L) 11/11/2018    HGB 10.4 (L) 12/05/2018    HCT 28.3 (L) 11/11/2018     (H) 11/11/2018    MCH 33.7 (H) 11/11/2018    MCHC 32.9 11/11/2018    RDW 12.9 11/11/2018     11/11/2018       BMP RESULTS:  Lab Results   Component Value Date     (L) 12/05/2018    POTASSIUM 3.9 12/05/2018    CHLORIDE 102 12/05/2018    CO2 29 12/05/2018    ANIONGAP 5.9 (L) 12/05/2018    GLC 99 12/05/2018    BUN 22 12/05/2018    CR 1.15 12/05/2018    GFRESTIMATED 63 12/05/2018    GFRESTBLACK 76 12/05/2018    CONOR 9.7 12/05/2018        A1C RESULTS:  Lab Results   Component Value Date    A1C 5.9 (H) 11/06/2018       INR RESULTS:  Lab Results   Component Value Date    INR 1.37 (H) 11/06/2018    INR 1.78 (H) 11/06/2018           CC  Marjorie Monroy PA-C  [address]  [place] [id number]        Thank you for allowing me to participate in the care of your patient.      Sincerely,     Marjorie Monroy PA-C     [facility]    cc:   Marjorie Monroy PA-C  [address]  [place] [address]

## 2018-12-05 NOTE — MR AVS SNAPSHOT
After Visit Summary   12/5/2018    David Richard Schoenecker    MRN: [id number]           Patient Information     Date Of Birth          1950        Visit Information        Provider Department      12/5/2018 12:30 PM Marjorie Monroy PA-C [facility]   [place]        Today's Diagnoses     Ischemic cardiomyopathy          Care Instructions    Please call [facility]ORE nurse for any questions or concerns:       [phone]    1. Medication changes:  Ok to stay off of potassium.  Continue current medications.      2.  Weigh yourself daily and write it down.     3. Call [facility]ORE nurse if your weight is up more than 2 pounds in one day, or 5 pounds in one week.    4. Call [facility]ORE nurse if you feel more short of breath, have more abdominal bloating or leg swelling.    5. Eat a low sodium diet (less than 2,000mg daily). If you eat less salt, you will retain less fluid.     6. Results: labs look ok.    Component      Latest Ref Rng & Units 12/5/2018   Sodium      136 - 145 mmol/L 133 (L)   Potassium      3.5 - 5.1 mmol/L 3.9   Chloride      98 - 107 mmol/L 102   Carbon Dioxide      23 - 29 mmol/L 29   Anion Gap      6 - 17 mmol/L 5.9 (L)   Glucose      70 - 105 mg/dL 99   Urea Nitrogen      7 - 30 mg/dL 22   Creatinine      0.70 - 1.30 mg/dL 1.15   GFR Estimate      >60 mL/min/1.7m2 63   GFR Estimate If Black      >60 mL/min/1.7m2 76   Calcium      8.5 - 10.5 mg/dL 9.7     7.  Follow up: with an echocardiogram, me with labs in 2 weeks.       Scheduling phone number: [phone]  Reminder: Please bring in all current medications, over the counter supplements and vitamin bottles to your next appointment.          Follow-ups after your visit        Additional Services     Follow-Up with [facility] - [facility] visit                 Your next 10 appointments already scheduled     Dec 07, 2018 11:00 AM [facility]   [facility] with [facility], [facility]  ([facility])    [address] [place] [address]   [phone]            Dec 10, 2018 11:00 AM CST   Cardiac Treatment with [facility], TH   [facility] ([facility])    [address] [place] [address]   [phone]            Dec 12, 2018 11:00 AM CST   Cardiac Treatment with [facility], [facility]   [facility] ([facility])    [address] [place] [address]   [phone]            Dec 14, 2018 11:00 AM CST   [facility]iac Treatment [facility], [facility]   [facility] ([facility])    [address]  [place] [address]   [phone]            Dec 17, 2018 11:00 AM CST   Cardiac [facility], TH   [facility] ([facility])    [address]  [place] [address]   [phone]            Dec 19, 2018 11:00 AM CST   [facility], [facility]   [facility] ([facility])    [address]  [facility] [place] [address]   [phone]            Dec 21, 2018 11:00 AM CST   Cardiac Treatment [facility], [facility]   [facility] ([facility])    [address]  [facility] [place] [address]   [phone]            Dec 24, 2018 11:00 AM CST   Cardiac Treatment [facility], [facility]   [facility] ([facility])    [address]  [facility] [place] [facility] [place] [address]   [phone]            Dec 26, 2018 11:00 AM CST   Cardiac Treatment [facility], TH   [facility] ([facility])    [address]  [place] [address]   [phone]            Dec 28, 2018 11:00 AM CST   Cardiac Treatment [facility], [facility]   [facility] ([facility])    [address] [place]  "[address]   [phone]              Future tests that were ordered for you today     Open Future Orders        Priority Expected Expires Ordered    Follow-Up with [facility] - [facility] visit Routine 2018    Basic metabolic panel Routine 2018    Echocardiogram Limited Routine 12/10/2018 2019 2018            Who to contact     If you have questions or need follow up information about today's clinic visit or your schedule please contact [facility] directly at [phone].  Normal or non-critical lab and imaging results will be communicated to you by Multifondshart, letter or phone within 4 business days after the clinic has received the results. If you do not hear from us within 7 days, please contact the clinic through Multifondshart or phone. If you have a critical or abnormal lab result, we will notify you by phone as soon as possible.  Submit refill requests through Worklight or call your pharmacy and they will forward the refill request to us. Please allow 3 business days for your refill to be completed.          Additional Information About Your Visit        MyChart Information     Worklight lets you send messages to your doctor, view your test results, renew your prescriptions, schedule appointments and more. To sign up, go to [email or web address] . Click on \"Log in\" on the left side of the screen, which will take you to the Welcome page. Then click on \"Sign up Now\" on the right side of the page.     You will be asked to enter the access code listed below, as well as some personal information. Please follow the directions to create your username and password.     Your access code is: [email or web address]  Expires: 2/10/2019  9:31 AM     Your access code will  in 90 days. If you need help or a new code, please call your [place] clinic or [phone].        Care EveryWhere ID     This is your Care EveryWhere ID. This could be " "used by other organizations to access your [facility] medical records  [id number]        Your Vitals Were     Pulse Height BMI (Body Mass Index)             72 1.803 m (5' 11\") 29.29 kg/m2          Blood Pressure from Last 3 Encounters:   12/05/18 (!) 84/50   11/28/18 120/60   11/20/18 (!) 82/48    Weight from Last 3 Encounters:   12/05/18 95.3 kg (210 lb)   11/28/18 94.8 kg (209 lb)   11/20/18 95.2 kg (209 lb 12.8 oz)              We Performed the Following     Follow-Up with [facility] - [facility] visit          Today's Medication Changes          These changes are accurate as of 12/5/18  1:01 PM.  If you have any questions, ask your nurse or doctor.               Stop taking these medicines if you haven't already. Please contact your care team if you have questions.     methocarbamol 500 MG tablet   Commonly known as:  ROBAXIN   Stopped by:  Marjorie Monroy PA-C           pantoprazole 40 MG EC tablet   Commonly known as:  PROTONIX   Stopped by:  Marjorie Monroy PA-C           potassium chloride ER 20 MEQ CR tablet   Commonly known as:  K-DUR/KLOR-CON M   Stopped by:  Marjorie Monroy PA-C                    Primary Care Provider Office Phone # Fax #    Naseem Nicholas Ramirez [phone]       [facility] [id number]  [place] [address]        Equal Access to Services     CHRISTIN AMEZCUA AH: Hadii aad ku hadasho Soomaali, waaxda luqadaha, qaybta kaalmada adeegyada, wax rajesh briceno [facility] boles. So [facility] [phone].    ATENCIÓN: Si habla español, tiene a hood disposición servicios gratuitos de asistencia lingüística. Paige al [phone].    We comply with applicable federal civil rights laws and [place] laws. We do not discriminate on the basis of race, color, national origin, age, disability, sex, sexual orientation, or gender identity.            Thank you!     Thank you for choosing [facility]  [facility] " your care. Our goal is always to provide you with excellent care. Hearing back from our patients is one way we can continue to improve our services. Please take a few minutes to complete the written survey that you may receive in the mail after your visit with us. Thank you!             Your Updated Medication List - Protect others around you: Learn how to safely use, store and throw away your medicines at www.disposemymeds.org.          This list is accurate as of 12/5/18  1:01 PM.  Always use your most recent med list.                   Brand Name Dispense Instructions for use Diagnosis    acetaminophen 325 MG tablet    TYLENOL    100 tablet    Take 1-2 tablets (325-650 mg) by mouth every 4 hours as needed for mild pain    S/P CABG (coronary artery bypass graft), Acute post-operative pain       aspirin 325 MG EC tablet    ASA    60 tablet    Take 1 tablet (325 mg) by mouth daily    S/P CABG (coronary artery bypass graft)       atorvastatin 40 MG tablet    LIPITOR    30 tablet    Take 1 tablet (40 mg) by mouth every evening    S/P CABG (coronary artery bypass graft)       furosemide 20 MG tablet    LASIX    90 tablet    Take 1 tablet (20 mg) by mouth daily    S/P CABG (coronary artery bypass graft), Ischemic cardiomyopathy       lisinopril 2.5 MG tablet    PRINIVIL/Zestril    90 tablet    Take 1 tablet (2.5 mg) by mouth daily    S/P CABG (coronary artery bypass graft)       loratadine 10 MG tablet    CLARITIN     Take 10 mg by mouth daily        metoprolol succinate ER 25 MG 24 hr tablet    TOPROL-XL    90 tablet    Take 1 tablet (25 mg) by mouth daily    S/P CABG (coronary artery bypass graft)

## 2018-12-05 NOTE — PROGRESS NOTES
[id number]  HPI and Plan:   See dictation    Orders this Visit:  Orders Placed This Encounter   Procedures     Basic metabolic panel     Follow-Up with [facility] - [facility] visit     Echocardiogram Limited     No orders of the defined types were placed in this encounter.    Medications Discontinued During This Encounter   Medication Reason     predniSONE (DELTASONE) 10 MG tablet Therapy completed     methocarbamol (ROBAXIN) 500 MG tablet      methocarbamol (ROBAXIN) 500 MG tablet      pantoprazole (PROTONIX) 40 MG EC tablet      potassium chloride ER (K-DUR/KLOR-CON M) 20 MEQ CR tablet          Encounter Diagnosis   Name Primary?     Ischemic cardiomyopathy        CURRENT MEDICATIONS:  Current Outpatient Prescriptions   Medication Sig Dispense Refill     acetaminophen (TYLENOL) 325 MG tablet Take 1-2 tablets (325-650 mg) by mouth every 4 hours as needed for mild pain 100 tablet 0     aspirin 325 MG EC tablet Take 1 tablet (325 mg) by mouth daily 60 tablet 0     atorvastatin (LIPITOR) 40 MG tablet Take 1 tablet (40 mg) by mouth every evening 30 tablet 3     furosemide (LASIX) 20 MG tablet Take 1 tablet (20 mg) by mouth daily 90 tablet 3     lisinopril (PRINIVIL/ZESTRIL) 2.5 MG tablet Take 1 tablet (2.5 mg) by mouth daily 90 tablet 3     loratadine (CLARITIN) 10 MG tablet Take 10 mg by mouth daily       metoprolol succinate (TOPROL-XL) 25 MG 24 hr tablet Take 1 tablet (25 mg) by mouth daily 90 tablet 3       ALLERGIES   No Known Allergies    PAST MEDICAL HISTORY:  Past Medical History:   Diagnosis Date     Heart disease      High cholesterol      Hypertension        PAST SURGICAL HISTORY:  Past Surgical History:   Procedure Laterality Date     APPENDECTOMY       BYPASS GRAFT ARTERY CORONARY N/A 11/6/2018    Procedure: CORONARY ARTERY BYPASS GRAFTING x 3 (LIMA - LAD; SV - OM; SV - PDA) WITH ENDOSCOPIC VEIN HARVEST ON THE LEFT LOWER EXTREMITY    (ON PUMP OXYGENATOR ; BAL BY KEYA)   ;  Surgeon: Sander Wan MD;   "Location: [facility] OR     ENT SURGERY      tonsillectomy       FAMILY HISTORY:  Family History   Problem Relation Age of Onset     Coronary Artery Disease Mother      Cerebrovascular Disease Mother      Myocardial Infarction Mother      Family History Negative Father      Unknown/Adopted Maternal Grandmother      Unknown/Adopted Maternal Grandfather      Unknown/Adopted Paternal Grandmother      Unknown/Adopted Paternal Grandfather      Heart Surgery Brother      bypass     Heart Surgery Brother      bypass     Heart Surgery Brother      bypass       SOCIAL HISTORY:  Social History     Social History     Marital status:      Spouse name: N/A     Number of children: N/A     Years of education: N/A     Social History Main Topics     Smoking status: Former Smoker     Start date: 1/1/1965     Quit date: 11/4/2018     Smokeless tobacco: Never Used      Comment: 11/4/18: last cigarette. 1/2 ppd. Off/on for 50 years     Alcohol use No     Drug use: No     Sexual activity: Not Asked     Other Topics Concern     None     Social History Narrative       Review of Systems:  Skin:  Negative     Eyes:  Positive for glasses  ENT:  Negative    Respiratory:  Negative    Cardiovascular:  Negative    Gastroenterology: Negative    Genitourinary:  Negative    Musculoskeletal:  Negative    Neurologic:  Negative    Psychiatric:  Negative    Heme/Lymph/Imm:  Negative    Endocrine:  Negative      Physical Exam:  Vitals: BP (!) 84/50  Pulse 72  Ht 1.803 m (5' 11\")  Wt 95.3 kg (210 lb)  BMI 29.29 kg/m2   Please refer to dictation for physical exam    Recent Lab Results:  LIPID RESULTS:  Lab Results   Component Value Date    CHOL 261 (H) 11/05/2018    HDL 36 (L) 11/05/2018     (H) 11/05/2018    TRIG 128 11/05/2018       LIVER ENZYME RESULTS:  Lab Results   Component Value Date    AST 34 11/04/2018    ALT 19 11/04/2018       CBC RESULTS:  Lab Results   Component Value Date    WBC 8.5 11/11/2018    RBC 2.76 (L) 11/11/2018    HGB 10.4 " (L) 12/05/2018    HCT 28.3 (L) 11/11/2018     (H) 11/11/2018    MCH 33.7 (H) 11/11/2018    MCHC 32.9 11/11/2018    RDW 12.9 11/11/2018     11/11/2018       BMP RESULTS:  Lab Results   Component Value Date     (L) 12/05/2018    POTASSIUM 3.9 12/05/2018    CHLORIDE 102 12/05/2018    CO2 29 12/05/2018    ANIONGAP 5.9 (L) 12/05/2018    GLC 99 12/05/2018    BUN 22 12/05/2018    CR 1.15 12/05/2018    GFRESTIMATED 63 12/05/2018    GFRESTBLACK 76 12/05/2018    CONOR 9.7 12/05/2018        A1C RESULTS:  Lab Results   Component Value Date    A1C 5.9 (H) 11/06/2018       INR RESULTS:  Lab Results   Component Value Date    INR 1.37 (H) 11/06/2018    INR 1.78 (H) 11/06/2018           CC  Marjorie Monroy PA-C  [address]  [place] [address]

## 2018-12-05 NOTE — PATIENT INSTRUCTIONS
Please call [facility] nurse for any questions or concerns:       [phone]    1. Medication changes:  Ok to stay off of potassium.  Continue current medications.      2.  Weigh yourself daily and write it down.     3. Call CORE nurse if your weight is up more than 2 pounds in one day, or 5 pounds in one week.    4. Call CORE nurse if you feel more short of breath, have more abdominal bloating or leg swelling.    5. Eat a low sodium diet (less than 2,000mg daily). If you eat less salt, you will retain less fluid.     6. Results: labs look ok.    Component      Latest Ref Rng & Units 12/5/2018   Sodium      136 - 145 mmol/L 133 (L)   Potassium      3.5 - 5.1 mmol/L 3.9   Chloride      98 - 107 mmol/L 102   Carbon Dioxide      23 - 29 mmol/L 29   Anion Gap      6 - 17 mmol/L 5.9 (L)   Glucose      70 - 105 mg/dL 99   Urea Nitrogen      7 - 30 mg/dL 22   Creatinine      0.70 - 1.30 mg/dL 1.15   GFR Estimate      >60 mL/min/1.7m2 63   GFR Estimate If Black      >60 mL/min/1.7m2 76   Calcium      8.5 - 10.5 mg/dL 9.7     7.  Follow up: with an echocardiogram, me with labs in 2 weeks.       Scheduling phone number: [phone]  Reminder: Please bring in all current medications, over the counter supplements and vitamin bottles to your next appointment.

## 2018-12-05 NOTE — PROGRESS NOTES
Service Date: 12/05/2018      CLINIC VISIT        PRIMARY CARDIOLOGIST:   Likely, will follow with Dr. Beatty or Dr. Pretty      REASON FOR VISIT:  C.O.R.E. Clinic followup.      HISTORY OF PRESENT ILLNESS:  Mr. Schoenecker is a delightful 68-year-old gentleman who had not seen a doctor for about 5 years and had untreated dyslipidemia and tobacco abuse who presented with exertional chest pain and shortness of breath at rest.  He went to the ED and was found to have an NSTEMI.  He had a lung nodule and mediastinal hilar lymphadenopathy on workup.  EF was found to be down at 30%-35% and he eventually went to coronary angiogram showing severe 3-vessel disease including a 95% thrombotic left main involving the LAD, circumflex, ramus and 85% distal LAD, 80% mid LAD, 70% diagonal, an occluded circ, a 70% mid RCA and a 99% distal RCA.  He also was treated for COPD exacerbation.  He required a balloon pump and did undergo CABG on 11/06/2018.  This was a 3-vessel CAB.  This was a LIMA to the LAD, saphenous vein graft to the OM and saphenous vein graft to the PDA.  Before this, he had been evaluated both by Infectious Disease and Pulmonary, as he had been found to have a lung nodule.  These were felt to be low risk.  He also was found to have bilateral carotid stenoses.  These were managed conservatively for the time being.      I met him several weeks ago and he was doing okay in spite of being hypotensive.  Today, he says he continues to get better.  He denies chest pain, shortness of breath, orthopnea or PND.  He is able to sleep flat now and is breathing fine.  He has not had syncope or presyncope.  He did not  his potassium, so he has been off that for about a week but he has been taking his Lasix.  He did not note any increased urination.  His wife continues to think he is getting better as well.      SOCIAL HISTORY:  He comes in today with his wife, Eun.  They are staying with their daughter in [place]  until they head to [place] which they typically do every winter.  They typically leave after [place] and return in early May.  He was a pack-a-day smoker, about a 50-pack-year history.  He is a nondrinker, never has.      PHYSICAL EXAMINATION:   GENERAL:  Well-developed, well-nourished gentleman in no acute distress, slightly pale.   HEENT:  Normocephalic, atraumatic.   HEART:  Regular in rate and rhythm.  I do not appreciate murmur, rub or gallop.   LUNGS:  Clear today without wheezes, rales or rhonchi.   EXTREMITIES:  With trace peripheral edema to mid shin.   SKIN:  Warm and dry.   NECK:  Veins are flat at 30 degrees.      LABORATORIES:  Today show creatinine 1.15, BUN 22, potassium 3.9, sodium 133.      ASSESSMENT AND PLAN:   1.  Coronary artery disease status post 3-vessel CAB with LIMA to the LAD, saphenous vein graft to the OM and saphenous vein graft to the PDA.  Appropriately on aspirin and statin.   2.  Ischemic cardiomyopathy without symptoms of heart failure with weight stable around 209-210 pounds.  We will continue him on Lasix 20 mg daily, in part because his EF is still low.  I think he is very high risk for re-accumulating volume.  Second, he is not doing perfect on his sodium and third, he has some trace peripheral edema.  I am hoping we will be able to discontinue this at the next visit.  We will continue his lisinopril at 2.5 mg daily and Toprol at 25 mg daily.  I am not able to up-titrate further due to hypotension.  This is asymptomatic.   3.  Bilateral carotid artery stenosis, per primary.   4.  Hyperlipidemia.  Horrible cholesterol with  on admission.  We will repeat in about 3 months.   5.  Tobacco abuse, now quit.  Congratulated on efforts.   6.  Right lung nodule, mediastinal hilar lymphadenopathy.  Followup is arranged for next week.      I will see him back with a limited echo prior in about 2 weeks before he heads to [place].  I would like to know his EF and see what kind of risk  we have before he travels for possible sudden cardiac death versus ICD.      Thank you for allowing me to participate in this delightful patient's care.      CONNIE Mcgrath PA-C             D: 2018   T: 2018   MT: JACKELINE      Name:     SCHOENECKER, DAVID   MRN:      [id number]        Account:      [id number]   :      1950           Service Date: 2018      Document: [id number]

## 2018-12-06 ENCOUNTER — TRANSFERRED RECORDS (OUTPATIENT)
Dept: HEALTH INFORMATION MANAGEMENT | Facility: CLINIC | Age: 68
End: 2018-12-06

## 2018-12-10 ENCOUNTER — HOSPITAL ENCOUNTER (OUTPATIENT)
Dept: CARDIAC REHAB | Facility: CLINIC | Age: 68
End: 2018-12-10
Attending: PHYSICIAN ASSISTANT
Payer: COMMERCIAL

## 2018-12-10 PROCEDURE — 93798 PHYS/QHP OP CAR RHAB W/ECG: CPT

## 2018-12-10 PROCEDURE — 40000116 ZZH STATISTIC OP CR VISIT

## 2018-12-11 ENCOUNTER — HOSPITAL ENCOUNTER (OUTPATIENT)
Dept: CARDIOLOGY | Facility: CLINIC | Age: 68
Discharge: HOME | End: 2018-12-11
Attending: PHYSICIAN ASSISTANT | Admitting: PHYSICIAN ASSISTANT
Payer: COMMERCIAL

## 2018-12-11 DIAGNOSIS — I25.5 ISCHEMIC CARDIOMYOPATHY: ICD-10-CM

## 2018-12-11 PROCEDURE — 93321 DOPPLER ECHO F-UP/LMTD STD: CPT | Mod: 26 | Performed by: INTERNAL MEDICINE

## 2018-12-11 PROCEDURE — 25500064 ZZH RX 255 OP 636: Performed by: PHYSICIAN ASSISTANT

## 2018-12-11 PROCEDURE — 93325 DOPPLER ECHO COLOR FLOW MAPG: CPT | Mod: 26 | Performed by: INTERNAL MEDICINE

## 2018-12-11 PROCEDURE — 93308 TTE F-UP OR LMTD: CPT | Mod: 26 | Performed by: INTERNAL MEDICINE

## 2018-12-11 RX ADMIN — HUMAN ALBUMIN MICROSPHERES AND PERFLUTREN 1 ML: 10; .22 INJECTION, SOLUTION INTRAVENOUS at 08:31

## 2018-12-12 DIAGNOSIS — I25.5 ISCHEMIC CARDIOMYOPATHY: ICD-10-CM

## 2018-12-12 LAB
ANION GAP SERPL CALCULATED.3IONS-SCNC: 9.4 MMOL/L (ref 6–17)
BUN SERPL-MCNC: 22 MG/DL (ref 7–30)
CALCIUM SERPL-MCNC: 9.6 MG/DL (ref 8.5–10.5)
CHLORIDE SERPL-SCNC: 104 MMOL/L (ref 98–107)
CO2 SERPL-SCNC: 29 MMOL/L (ref 23–29)
CREAT SERPL-MCNC: 1.17 MG/DL (ref 0.7–1.3)
GFR SERPL CREATININE-BSD FRML MDRD: 62 ML/MIN/1.7M2
GLUCOSE SERPL-MCNC: 119 MG/DL (ref 70–105)
POTASSIUM SERPL-SCNC: 4.4 MMOL/L (ref 3.5–5.1)
SODIUM SERPL-SCNC: 138 MMOL/L (ref 136–145)

## 2018-12-12 PROCEDURE — 36415 COLL VENOUS BLD VENIPUNCTURE: CPT | Performed by: PHYSICIAN ASSISTANT

## 2018-12-12 PROCEDURE — 80048 BASIC METABOLIC PNL TOTAL CA: CPT | Performed by: PHYSICIAN ASSISTANT

## 2018-12-14 ENCOUNTER — HOSPITAL ENCOUNTER (OUTPATIENT)
Dept: CARDIAC REHAB | Facility: CLINIC | Age: 68
End: 2018-12-14
Attending: PHYSICIAN ASSISTANT
Payer: COMMERCIAL

## 2018-12-14 PROCEDURE — 40000116 ZZH STATISTIC OP CR VISIT

## 2018-12-14 PROCEDURE — 93798 PHYS/QHP OP CAR RHAB W/ECG: CPT

## 2018-12-17 ENCOUNTER — HOSPITAL ENCOUNTER (OUTPATIENT)
Dept: CARDIAC REHAB | Facility: CLINIC | Age: 68
End: 2018-12-17
Attending: PHYSICIAN ASSISTANT
Payer: COMMERCIAL

## 2018-12-17 ENCOUNTER — OFFICE VISIT (OUTPATIENT)
Dept: CARDIOLOGY | Facility: CLINIC | Age: 68
End: 2018-12-17
Attending: PHYSICIAN ASSISTANT
Payer: COMMERCIAL

## 2018-12-17 VITALS
SYSTOLIC BLOOD PRESSURE: 90 MMHG | HEIGHT: 71 IN | HEART RATE: 76 BPM | OXYGEN SATURATION: 97 % | WEIGHT: 214.1 LBS | DIASTOLIC BLOOD PRESSURE: 58 MMHG | BODY MASS INDEX: 29.97 KG/M2

## 2018-12-17 DIAGNOSIS — Z95.1 S/P CABG (CORONARY ARTERY BYPASS GRAFT): ICD-10-CM

## 2018-12-17 DIAGNOSIS — I25.5 ISCHEMIC CARDIOMYOPATHY: ICD-10-CM

## 2018-12-17 PROCEDURE — 93798 PHYS/QHP OP CAR RHAB W/ECG: CPT

## 2018-12-17 PROCEDURE — 99214 OFFICE O/P EST MOD 30 MIN: CPT | Performed by: PHYSICIAN ASSISTANT

## 2018-12-17 PROCEDURE — 40000116 ZZH STATISTIC OP CR VISIT

## 2018-12-17 RX ORDER — ATORVASTATIN CALCIUM 40 MG/1
40 TABLET, FILM COATED ORAL EVERY EVENING
Qty: 90 TABLET | Refills: 3 | Status: SHIPPED | OUTPATIENT
Start: 2018-12-17 | End: 2019-06-24

## 2018-12-17 ASSESSMENT — MIFFLIN-ST. JEOR: SCORE: 1753.28

## 2018-12-17 NOTE — PROGRESS NOTES
Service Date: 12/17/2018     CLINIC VISIT        PRIMARY CARDIOLOGIST:   Likely, will follow with Dr. Beatty or Dr. Pretty      REASON FOR VISIT:  C.O.R.E. Clinic followup.      HISTORY OF PRESENT ILLNESS:  Mr. Schoenecker is a delightful 68-year-old gentleman who had not seen a doctor for about 5 years and had untreated dyslipidemia and tobacco abuse who presented with exertional chest pain and shortness of breath at rest.  He went to the ED and was found to have an NSTEMI.  He had a lung nodule and mediastinal hilar lymphadenopathy on workup.  EF was found to be down at 30%-35% and he eventually went to coronary angiogram showing severe 3-vessel disease including a 95% thrombotic left main involving the LAD, circumflex, ramus and 85% distal LAD, 80% mid LAD, 70% diagonal, an occluded circ, a 70% mid RCA and a 99% distal RCA.  He also was treated for COPD exacerbation.  He required a balloon pump and did undergo CABG on 11/06/2018.  This was a 3-vessel CAB.  This was a LIMA to the LAD, saphenous vein graft to the OM and saphenous vein graft to the PDA.  Before this, he had been evaluated both by Infectious Disease and Pulmonary, as he had been found to have a lung nodule.  These were felt to be low risk.  He also was found to have bilateral carotid stenoses.  These were managed conservatively for the time being.      I met him several weeks ago and he was doing okay in spite of being hypotensive.  Today, he says he continues to get better.  He has not had syncope or presyncope, palpitations, chest pain, shortness of breath, orthopnea nor PND.  His weight is up about 4 pounds but he states that because his appetite is so good.  He says he continues to get better every single day.  He is attending cardiac rehab without difficulty.  He does get a little bit lightheaded if he stands up too fast but this has not been particularly bothersome to him.     SOCIAL HISTORY:  He comes in today with his wife, Eun.  They are  staying with their daughter in [place] until they head to [place].  They intend to drive to in [facility] next week and will return in early May.   He was a pack-a-day smoker, about a 50-pack-year history.  He is a nondrinker, never has.      PHYSICAL EXAMINATION:   GENERAL:  Well-developed, well-nourished gentleman in no acute distress, slightly pale.   HEENT:  Normocephalic, atraumatic.   HEART:  Regular in rate and rhythm.  I do not appreciate murmur, rub or gallop.   LUNGS:  Clear today without wheezes, rales or rhonchi.   EXTREMITIES:  With trace peripheral edema to mid shin.   SKIN:  Warm and dry.   NECK:  Veins are flat at 30 degrees.      LABORATORIES:  Today show creatinine 1.17, BUN 22, potassium 4.4, sodium 138.      ASSESSMENT AND PLAN:   1.  Coronary artery disease status post 3-vessel CAB with LIMA to the LAD, saphenous vein graft to the OM and saphenous vein graft to the PDA.  Appropriately on aspirin and statin.   2.  Ischemic cardiomyopathy without symptoms of heart failure with dry weight felt to be around 209-210 pounds, but his weight up to 214 pounds and absolutely euvolemic on exam.  I suspect he is going caloric weight and we discussed this he indicated that his appetite is good with him quitting smoking he thinks he is gaining weight from this.  Ideally we would up titrate his ACE inhibitor and beta-blocker in addition to adding spironolactone.  At this point though with the systolic blood pressure of 90 he do not feel that is possible.  We will continue him on Lasix 20 as a diuretic.  He had stopped then restarted his potassium.  His potassium has been stable off that he can remain off his potassium today.  We discussed at length and 1 taking the medications on his drug list if he is not he should call and we will discuss.  On limited echocardiogram his EF is about 35% and is likely improved from previous where it was read as 30-35 but the current meter suspect that it  previously was more around 25-30%.  I discussed the risks today of sudden cardiac death and ventricular tachycardia and an EF of 35%.  That being said it is only about a 7 weeks out post CABG I expect his EF will continue to improve.  If in February his EF has not gone above 35% he would be a candidate for ICD.  This was reviewed today and I recommend he get a repeat echocardiogram in mid February.  In addition, he should establish with a heart failure cardiologist in [place] who can further uptitrate medications as his blood pressure allows.  Unfortunately do not have anyone I can recommend.    3.  Bilateral carotid artery stenosis, per primary.   4.  Hyperlipidemia.  Horrible cholesterol with  on admission.  Recommend repeating in February.    5.  Tobacco abuse, now quit.  Congratulated on efforts.   6.  Right lung nodule, mediastinal hilar lymphadenopathy.  He was seen by pulmonary with apparent plans of f/u in 4 months.  Also started on an inhaler of unknown name that was too expensive.  They are working with his pulmonologist for another drug.       We will plan on seeing him back when he returns from [place] in May that point he should reestablish care with 1 of our heart failure doctors I believe Dr. RIOS would be a good fit for him.  In the interim we will follow with cardiologist in [place].Thank you for allowing me to participate in this delightful patient's care.      Marjorie Monroy PA-C

## 2018-12-17 NOTE — LETTER
12/17/2018    Naseem Ramirez MD  [facility] [address]  [facility] [address]    RE: Genaro Hernandezeloisa       Dear Colleague,    I had the pleasure of seeing David Richard Schoenecker in the [facility].    Service Date: 12/17/2018     CLINIC VISIT        PRIMARY CARDIOLOGIST:   Likely, will follow with Dr. Beatty or Dr. Pretty      REASON FOR VISIT:  C.O.R.E. Clinic followup.      HISTORY OF PRESENT ILLNESS:  Mr. Schoenecker is a delightful 68-year-old gentleman who had not seen a doctor for about 5 years and had untreated dyslipidemia and tobacco abuse who presented with exertional chest pain and shortness of breath at rest.  He went to the ED and was found to have an NSTEMI.  He had a lung nodule and mediastinal hilar lymphadenopathy on workup.  EF was found to be down at 30%-35% and he eventually went to coronary angiogram showing severe 3-vessel disease including a 95% thrombotic left main involving the LAD, circumflex, ramus and 85% distal LAD, 80% mid LAD, 70% diagonal, an occluded circ, a 70% mid RCA and a 99% distal RCA.  He also was treated for COPD exacerbation.  He required a balloon pump and did undergo CABG on 11/06/2018.  This was a 3-vessel CAB.  This was a LIMA to the LAD, saphenous vein graft to the OM and saphenous vein graft to the PDA.  Before this, he had been evaluated both by Infectious Disease and Pulmonary, as he had been found to have a lung nodule.  These were felt to be low risk.  He also was found to have bilateral carotid stenoses.  These were managed conservatively for the time being.      I met him several weeks ago and he was doing okay in spite of being hypotensive.  Today, he says he continues to get better.  He has not had syncope or presyncope, palpitations, chest pain, shortness of breath, orthopnea nor PND.  His weight is up about 4 pounds but he states that because his appetite is so good.  He says he continues  to get better every single day.  He is attending cardiac rehab without difficulty.  He does get a little bit lightheaded if he stands up too fast but this has not been particularly bothersome to him.     SOCIAL HISTORY:  He comes in today with his wife, Eun.  They are staying with their daughter in [place] until they head to [place].  They intend to drive to in [facility] next week and will return in early May.   He was a pack-a-day smoker, about a 50-pack-year history.  He is a nondrinker, never has.      PHYSICAL EXAMINATION:   GENERAL:  Well-developed, well-nourished gentleman in no acute distress, slightly pale.   HEENT:  Normocephalic, atraumatic.   HEART:  Regular in rate and rhythm.  I do not appreciate murmur, rub or gallop.   LUNGS:  Clear today without wheezes, rales or rhonchi.   EXTREMITIES:  With trace peripheral edema to mid shin.   SKIN:  Warm and dry.   NECK:  Veins are flat at 30 degrees.      LABORATORIES:  Today show creatinine 1.17, BUN 22, potassium 4.4, sodium 138.      ASSESSMENT AND PLAN:   1.  Coronary artery disease status post 3-vessel CAB with LIMA to the LAD, saphenous vein graft to the OM and saphenous vein graft to the PDA.  Appropriately on aspirin and statin.   2.  Ischemic cardiomyopathy without symptoms of heart failure with dry weight felt to be around 209-210 pounds, but his weight up to 214 pounds and absolutely euvolemic on exam.  I suspect he is going caloric weight and we discussed this he indicated that his appetite is good with him quitting smoking he thinks he is gaining weight from this.  Ideally we would up titrate his ACE inhibitor and beta-blocker in addition to adding spironolactone.  At this point though with the systolic blood pressure of 90 he do not feel that is possible.  We will continue him on Lasix 20 as a diuretic.  He had stopped then restarted his potassium.  His potassium has been stable off that he can remain off his potassium today.  We  discussed at length and 1 taking the medications on his drug list if he is not he should call and we will discuss.  On limited echocardiogram his EF is about 35% and is likely improved from previous where it was read as 30-35 but the current meter suspect that it previously was more around 25-30%.  I discussed the risks today of sudden cardiac death and ventricular tachycardia and an EF of 35%.  That being said it is only about a 7 weeks out post CABG I expect his EF will continue to improve.  If in February his EF has not gone above 35% he would be a candidate for ICD.  This was reviewed today and I recommend he get a repeat echocardiogram in mid February.  In addition, he should establish with a heart failure cardiologist in [place] who can further uptitrate medications as his blood pressure allows.  Unfortunately do not have anyone I can recommend.    3.  Bilateral carotid artery stenosis, per primary.   4.  Hyperlipidemia.  Horrible cholesterol with  on admission.  Recommend repeating in February.    5.  Tobacco abuse, now quit.  Congratulated on efforts.   6.  Right lung nodule, mediastinal hilar lymphadenopathy.  He was seen by pulmonary with apparent plans of f/u in 4 months.  Also started on an inhaler of unknown name that was too expensive.  They are working with his pulmonologist for another drug.       We will plan on seeing him back when he returns from [place] in May that point he should reestablish care with 1 of our heart failure doctors I believe Dr. RIOS would be a good fit for him.  In the interim we will follow with cardiologist in [place].Thank you for allowing me to participate in this delightful patient's care.      Marjorie Monroy PA-C             Thank you for allowing me to participate in the care of your patient.      Sincerely,     Marjorie Monroy PA-C     [facility]    cc:   Marjorie Monroy PA-C  [address]  [place] [id number]

## 2018-12-17 NOTE — PATIENT INSTRUCTIONS
Call [facility] nurse for any questions or concerns:  [phone]   *If you have concerns after hours, please call [phone], option 2 to speak with on call Cardiologist.    Please make an appointment with a cardiologist in [place].  Ideally, it would be a heart failure specialist.  Please given him or her the copies of your records.      Your EF (ejection fraction) is improving to 35%, but you're still at risk for irregular heart rhythms that can be dangerous.  If your heart is still less than 35% after February, you would be a candidate for an ICD (internal cardiac defibrillator).  If you pass out at all, please go to the ER.      1. Medication changes from today:  Continue current medications.       2. Weigh yourself daily and write it down.     3. Call [facility] nurse if your weight is up more than 2 pounds in one day or 5 pounds in one week.     4. Call [facility] nurse if you feel more short of breath, have more abdominal bloating, or leg swelling.     5. Continue low sodium diet (less than 2000 mg daily). If you eat less salt, you will retain less fluid.     6. Alcohol can weaken your heart further. You should avoid alcohol or limit its use to special times, such as a holiday or birthday.      7. Do NOT take Aleve or ibuprofen without talking to your doctor first.      8. Lab Results: labs look good.       Component      Latest Ref Rng & Units 12/5/2018 12/12/2018   Sodium      136 - 145 mmol/L 133 (L) 138   Potassium      3.5 - 5.1 mmol/L 3.9 4.4   Chloride      98 - 107 mmol/L 102 104   Carbon Dioxide      23 - 29 mmol/L 29 29   Anion Gap      6 - 17 mmol/L 5.9 (L) 9.4   Glucose      70 - 105 mg/dL 99 119 (H)   Urea Nitrogen      7 - 30 mg/dL 22 22   Creatinine      0.70 - 1.30 mg/dL 1.15 1.17   GFR Estimate      >60 mL/min/1.7m2 63 62   GFR Estimate If Black      >60 mL/min/1.7m2 76 75   Calcium      8.5 - 10.5 mg/dL 9.7 9.6     [facility]: Cardiomyopathy, Optimization, Rehabilitation, Education  The [facility] is a  heart failure specialty clinic within the [facility] where you will work with specialized nurse practitioners, physician assistants, doctors, and registered nurses. They are dedicated to helping patients with heart failure to carefully adjust medications, receive education, and learn who and when to call if symptoms develop. They specialize in helping you better understand your condition, slow the progression of your disease, improve the length and quality of your life, help you detect future heart problems before they become life threatening, and avoid hospitalizations.

## 2018-12-17 NOTE — LETTER
12/17/2018    Naseem Ramirez MD  [facility]   [address]  [facility] [address]    RE: Genaro Hernandezeloisa       Dear Colleague,    I had the pleasure of seeing David Richard Schoenecker in the [facility].    Service Date: 12/17/2018     CLINIC VISIT        PRIMARY CARDIOLOGIST:   Likely, will follow with Dr. Beatty or Dr. Pretty      REASON FOR VISIT:  C.O.R.E. Clinic followup.      HISTORY OF PRESENT ILLNESS:  Mr. Schoenecker is a delightful 68-year-old gentleman who had not seen a doctor for about 5 years and had untreated dyslipidemia and tobacco abuse who presented with exertional chest pain and shortness of breath at rest.  He went to the ED and was found to have an NSTEMI.  He had a lung nodule and mediastinal hilar lymphadenopathy on workup.  EF was found to be down at 30%-35% and he eventually went to coronary angiogram showing severe 3-vessel disease including a 95% thrombotic left main involving the LAD, circumflex, ramus and 85% distal LAD, 80% mid LAD, 70% diagonal, an occluded circ, a 70% mid RCA and a 99% distal RCA.  He also was treated for COPD exacerbation.  He required a balloon pump and did undergo CABG on 11/06/2018.  This was a 3-vessel CAB.  This was a LIMA to the LAD, saphenous vein graft to the OM and saphenous vein graft to the PDA.  Before this, he had been evaluated both by Infectious Disease and Pulmonary, as he had been found to have a lung nodule.  These were felt to be low risk.  He also was found to have bilateral carotid stenoses.  These were managed conservatively for the time being.      I met him several weeks ago and he was doing okay in spite of being hypotensive.  Today, he says he continues to get better.  He has not had syncope or presyncope, palpitations, chest pain, shortness of breath, orthopnea nor PND.  His weight is up about 4 pounds but he states that because his appetite is so good.  He says he continues  to get better every single day.  He is attending cardiac rehab without difficulty.  He does get a little bit lightheaded if he stands up too fast but this has not been particularly bothersome to him.     SOCIAL HISTORY:  He comes in today with his wife, Eun.  They are staying with their daughter in [place] until they head to [place].  They intend to drive to in [facility] next week and will return in early May.   He was a pack-a-day smoker, about a 50-pack-year history.  He is a nondrinker, never has.      PHYSICAL EXAMINATION:   GENERAL:  Well-developed, well-nourished gentleman in no acute distress, slightly pale.   HEENT:  Normocephalic, atraumatic.   HEART:  Regular in rate and rhythm.  I do not appreciate murmur, rub or gallop.   LUNGS:  Clear today without wheezes, rales or rhonchi.   EXTREMITIES:  With trace peripheral edema to mid shin.   SKIN:  Warm and dry.   NECK:  Veins are flat at 30 degrees.      LABORATORIES:  Today show creatinine 1.17, BUN 22, potassium 4.4, sodium 138.      ASSESSMENT AND PLAN:   1.  Coronary artery disease status post 3-vessel CAB with LIMA to the LAD, saphenous vein graft to the OM and saphenous vein graft to the PDA.  Appropriately on aspirin and statin.   2.  Ischemic cardiomyopathy without symptoms of heart failure with dry weight felt to be around 209-210 pounds, but his weight up to 214 pounds and absolutely euvolemic on exam.  I suspect he is going caloric weight and we discussed this he indicated that his appetite is good with him quitting smoking he thinks he is gaining weight from this.  Ideally we would up titrate his ACE inhibitor and beta-blocker in addition to adding spironolactone.  At this point though with the systolic blood pressure of 90 he do not feel that is possible.  We will continue him on Lasix 20 as a diuretic.  He had stopped then restarted his potassium.  His potassium has been stable off that he can remain off his potassium today.  We  discussed at length and 1 taking the medications on his drug list if he is not he should call and we will discuss.  On limited echocardiogram his EF is about 35% and is likely improved from previous where it was read as 30-35 but the current meter suspect that it previously was more around 25-30%.  I discussed the risks today of sudden cardiac death and ventricular tachycardia and an EF of 35%.  That being said it is only about a 7 weeks out post CABG I expect his EF will continue to improve.  If in February his EF has not gone above 35% he would be a candidate for ICD.  This was reviewed today and I recommend he get a repeat echocardiogram in mid February.  In addition, he should establish with a heart failure cardiologist in [place] who can further uptitrate medications as his blood pressure allows.  Unfortunately do not have anyone I can recommend.    3.  Bilateral carotid artery stenosis, per primary.   4.  Hyperlipidemia.  Horrible cholesterol with  on admission.  Recommend repeating in February.    5.  Tobacco abuse, now quit.  Congratulated on efforts.   6.  Right lung nodule, mediastinal hilar lymphadenopathy.  He was seen by pulmonary with apparent plans of f/u in 4 months.  Also started on an inhaler of unknown name that was too expensive.  They are working with his pulmonologist for another drug.       We will plan on seeing him back when he returns from [place] in May that point he should reestablish care with 1 of our heart failure doctors I believe Dr. RIOS would be a good fit for him.  In the interim we will follow with cardiologist in [place].Thank you for allowing me to participate in this delightful patient's care.      Marjorie Monroy PA-C             Thank you for allowing me to participate in the care of your patient.      Sincerely,     Marjorie Monroy PA-C     [facility]

## 2018-12-17 NOTE — LETTER
12/17/2018         RE: David Richard Schoenecker  [address]  [place] [address]        Dear Colleague,    Thank you for referring your patient, David Richard Schoenecker, to the [facility]. Please see a copy of my visit note below.    Service Date: 12/17/2018     CLINIC VISIT        PRIMARY CARDIOLOGIST:   Likely, will follow with Dr. Beatty or Dr. Pretty      REASON FOR VISIT:  C.O.R.E. Clinic followup.      HISTORY OF PRESENT ILLNESS:  Mr. Schoenecker is a delightful 68-year-old gentleman who had not seen a doctor for about 5 years and had untreated dyslipidemia and tobacco abuse who presented with exertional chest pain and shortness of breath at rest.  He went to the ED and was found to have an NSTEMI.  He had a lung nodule and mediastinal hilar lymphadenopathy on workup.  EF was found to be down at 30%-35% and he eventually went to coronary angiogram showing severe 3-vessel disease including a 95% thrombotic left main involving the LAD, circumflex, ramus and 85% distal LAD, 80% mid LAD, 70% diagonal, an occluded circ, a 70% mid RCA and a 99% distal RCA.  He also was treated for COPD exacerbation.  He required a balloon pump and did undergo CABG on 11/06/2018.  This was a 3-vessel CAB.  This was a LIMA to the LAD, saphenous vein graft to the OM and saphenous vein graft to the PDA.  Before this, he had been evaluated both by Infectious Disease and Pulmonary, as he had been found to have a lung nodule.  These were felt to be low risk.  He also was found to have bilateral carotid stenoses.  These were managed conservatively for the time being.      I met him several weeks ago and he was doing okay in spite of being hypotensive.  Today, he says he continues to get better.  He has not had syncope or presyncope, palpitations, chest pain, shortness of breath, orthopnea nor PND.  His weight is up about 4 pounds but he states that because his appetite is so good.  He says he  continues to get better every single day.  He is attending cardiac rehab without difficulty.  He does get a little bit lightheaded if he stands up too fast but this has not been particularly bothersome to him.     SOCIAL HISTORY:  He comes in today with his wife, Eun.  They are staying with their daughter in [place] until they head to [place].  They intend to drive to in [facility] next week and will return in early May.   He was a pack-a-day smoker, about a 50-pack-year history.  He is a nondrinker, never has.      PHYSICAL EXAMINATION:   GENERAL:  Well-developed, well-nourished gentleman in no acute distress, slightly pale.   HEENT:  Normocephalic, atraumatic.   HEART:  Regular in rate and rhythm.  I do not appreciate murmur, rub or gallop.   LUNGS:  Clear today without wheezes, rales or rhonchi.   EXTREMITIES:  With trace peripheral edema to mid shin.   SKIN:  Warm and dry.   NECK:  Veins are flat at 30 degrees.      LABORATORIES:  Today show creatinine 1.17, BUN 22, potassium 4.4, sodium 138.      ASSESSMENT AND PLAN:   1.  Coronary artery disease status post 3-vessel CAB with LIMA to the LAD, saphenous vein graft to the OM and saphenous vein graft to the PDA.  Appropriately on aspirin and statin.   2.  Ischemic cardiomyopathy without symptoms of heart failure with dry weight felt to be around 209-210 pounds, but his weight up to 214 pounds and absolutely euvolemic on exam.  I suspect he is going caloric weight and we discussed this he indicated that his appetite is good with him quitting smoking he thinks he is gaining weight from this.  Ideally we would up titrate his ACE inhibitor and beta-blocker in addition to adding spironolactone.  At this point though with the systolic blood pressure of 90 he do not feel that is possible.  We will continue him on Lasix 20 as a diuretic.  He had stopped then restarted his potassium.  His potassium has been stable off that he can remain off his potassium  today.  We discussed at length and 1 taking the medications on his drug list if he is not he should call and we will discuss.  On limited echocardiogram his EF is about 35% and is likely improved from previous where it was read as 30-35 but the current meter suspect that it previously was more around 25-30%.  I discussed the risks today of sudden cardiac death and ventricular tachycardia and an EF of 35%.  That being said it is only about a 7 weeks out post CABG I expect his EF will continue to improve.  If in February his EF has not gone above 35% he would be a candidate for ICD.  This was reviewed today and I recommend he get a repeat echocardiogram in mid February.  In addition, he should establish with a heart failure cardiologist in [place] who can further uptitrate medications as his blood pressure allows.  Unfortunately do not have anyone I can recommend.    3.  Bilateral carotid artery stenosis, per primary.   4.  Hyperlipidemia.  Horrible cholesterol with  on admission.  Recommend repeating in February.    5.  Tobacco abuse, now quit.  Congratulated on efforts.   6.  Right lung nodule, mediastinal hilar lymphadenopathy.  He was seen by pulmonary with apparent plans of f/u in 4 months.  Also started on an inhaler of unknown name that was too expensive.  They are working with his pulmonologist for another drug.       We will plan on seeing him back when he returns from [place] in May that point he should reestablish care with 1 of our heart failure doctors I believe Dr. RIOS would be a good fit for him.  In the interim we will follow with cardiologist in [place].Thank you for allowing me to participate in this delightful patient's care.      Marjorie Monroy PA-C             Again, thank you for allowing me to participate in the care of your patient.        Sincerely,        Marjorie Monroy PA-C

## 2018-12-19 ENCOUNTER — HOSPITAL ENCOUNTER (OUTPATIENT)
Dept: CARDIAC REHAB | Facility: CLINIC | Age: 68
End: 2018-12-19
Attending: PHYSICIAN ASSISTANT
Payer: COMMERCIAL

## 2018-12-19 PROCEDURE — 93798 PHYS/QHP OP CAR RHAB W/ECG: CPT

## 2018-12-19 PROCEDURE — 40000116 ZZH STATISTIC OP CR VISIT

## 2019-03-16 NOTE — CONSULTS
CARDIAC SURGERY NUTRITION CONSULT    Received standing order to assess and educate patient.    Will follow and complete assessment once patient is extubated and/or is transferred to medical unit.    Patient will receive nutrition education during the Outpatient Cardiac Rehab Program (nutrition classes/dietitian counseling).    Florinda Cowan RD, LD, [facility]   Clinical Dietitian - [facility]      Name band;

## 2019-05-08 ENCOUNTER — TELEPHONE (OUTPATIENT)
Dept: CARDIOLOGY | Facility: CLINIC | Age: 69
End: 2019-05-08

## 2019-05-08 NOTE — TELEPHONE ENCOUNTER
Pt's dtr LVM requesting to discuss need for follow-up and dtr noted that [place] heart attack while [place] state.     Called dtr back and LVM requesting call back to review follow-up timing.    Per Marjorie CADET 12/17/18,  We will plan on seeing him back when he returns from [place] in May that point he should reestablish care with 1 of our heart failure doctors I believe Dr. RIOS would be a good fit for him.  In the interim we will follow with cardiologist in [place].    Sumaya Romero RN BSN   2:06 PM 05/08/19

## 2019-05-13 NOTE — TELEPHONE ENCOUNTER
Pt's wife calling - they are back from [place] - needs follow up with cardiologist- had CABG 11/2018  Per Nancy VAZQUEZ - Dr. Yepez or Sukhwinder  Scheduled for June 20th.  Will review if pre visit testing needed

## 2019-05-13 NOTE — TELEPHONE ENCOUNTER
After reviewed the chart in more detail primary cardiologist is Dr. Solares- if he has availability he should see him.  Otherwise can switch to a core MD, but only if Dr. Solares is full.

## 2019-05-17 NOTE — TELEPHONE ENCOUNTER
Dr. Solares has no openings prior to OV that was arranged for pt 6/24 w/ Dr. Felix.    Sumaya Romero RN BSN   8:02 AM 05/17/19

## 2019-06-24 ENCOUNTER — OFFICE VISIT (OUTPATIENT)
Dept: CARDIOLOGY | Facility: CLINIC | Age: 69
End: 2019-06-24
Payer: COMMERCIAL

## 2019-06-24 VITALS
SYSTOLIC BLOOD PRESSURE: 128 MMHG | WEIGHT: 233 LBS | BODY MASS INDEX: 32.5 KG/M2 | HEART RATE: 83 BPM | DIASTOLIC BLOOD PRESSURE: 76 MMHG

## 2019-06-24 DIAGNOSIS — Z95.1 S/P CABG (CORONARY ARTERY BYPASS GRAFT): Primary | ICD-10-CM

## 2019-06-24 PROCEDURE — 99214 OFFICE O/P EST MOD 30 MIN: CPT | Performed by: INTERNAL MEDICINE

## 2019-06-24 RX ORDER — ATORVASTATIN CALCIUM 80 MG/1
80 TABLET, FILM COATED ORAL EVERY EVENING
Qty: 90 TABLET | Refills: 3 | Status: SHIPPED | OUTPATIENT
Start: 2019-06-24 | End: 2020-05-20 | Stop reason: ALTCHOICE

## 2019-06-24 NOTE — PROGRESS NOTES
HPI and Plan:   Today I had the pleasure of seeing David Richard Schoenecker at [facility] in [place]. He is a pleasant 68 year old patient with a past medical history of CAD S/P CABG with LIMA to LAD, SVG to OM and PDA, ischemic cardiomyopathy with ejection fraction of 35%, hypertension, hyperlipidemia and mild to moderate bilateral carotid stenosis who is here in the clinic to establish care after with me.  He was previously seen by Marjorie Monroy on 12/20/2018 after undergoing CABG.    The patient has been doing well since his prior clinic visit.  He is underwent cardiac rehabitation and has been tolerating it well.  He does describe an episode of hypotension when he was trying to have his daughter move.  His daughter took his blood pressure because he was fatigued and he was found to have blood pressure of 70/40.  He did feel tired and slightly dizzy at that time.  He continues to take 20 mg of Lasix since hospital discharge.  During the clinic his blood pressure is 128/76 which he thinks is unusually high.    He otherwise is doing well and does not report any chest pain or dyspnea on exertion.  His last echocardiogram was on 12/11/2018 prior to visit with Tiffani and showed mild increase in ejection fraction from 25-30 to 35%.  There was inferior, inferolateral and lateral wall hypokinesis.    Assessment and plan    1.  CAD status post CABG x3  2.  Moderate carotid artery disease  3.  Hyperlipidemia  4.  Ischemic cardiomyopathy with ejection fraction of 35%  5.  Hypotension    Discussion    The patient is currently on 325 mg of aspirin. I will discontinue that and start him on 81 mg.  I will also uptitrate Lipitor from 40 to 80 mg.  His last LDL cholesterol was 199 mg/dL at the time of initial hospital presentation.  I will recheck fasting lipid profile prior to uptitrating Lipitor.  If the LDL continues to be elevated on maximal dose of Lipitor I will consider adding Zetia.  As far as moderate  carotid artery disease is concerned I will continue aggressive medical management to prevent progression of the disease.  For ischemic cardia myopathy I want him to be on half dose of beta-blocker and ACE inhibitor.  However, I am limited by hypotension.  Will discontinue Lasix which I think is making him dehydrated and causing hypotension and reevaluate him.  I told him to take his blood pressure every morning and get back to us in 2 to 3 weeks.  If his blood pressures found to be good we can uptitrate lisinopril to 5 mg.  I will also repeat a transthoracic echocardiogram to reevaluate ejection fraction.  If his ejection fraction continues to be low he may require ICD placement for primary prevention.    Plan  1.  Discontinue to 325 mg aspirin start aspirin 81 mg daily  2.  Increase Lipitor to 80 mg  3.  Limited echocardiogram to evaluate LV ejection fraction  4.  Follow-up with Bridgett Mehta in [facility] in 3 months  5.  Follow-up with me in 6 months  6.  Discontinue Lasix.  Check your blood pressure in a.m. daily.  Report back with the blood pressure measurements in 2-3 weeks.    Thank you for allowing me to participate in the care of David Richard Schoenecker Rupesh Ranjan, MD  Cardiology    CURRENT MEDICATIONS:  Current Outpatient Medications   Medication Sig Dispense Refill     aspirin 325 MG EC tablet Take 1 tablet (325 mg) by mouth daily 60 tablet 0     atorvastatin (LIPITOR) 40 MG tablet Take 1 tablet (40 mg) by mouth every evening 90 tablet 3     furosemide (LASIX) 20 MG tablet Take 1 tablet (20 mg) by mouth daily 90 tablet 3     lisinopril (PRINIVIL/ZESTRIL) 2.5 MG tablet Take 1 tablet (2.5 mg) by mouth daily 90 tablet 3     loratadine (CLARITIN) 10 MG tablet Take 10 mg by mouth daily       metoprolol succinate (TOPROL-XL) 25 MG 24 hr tablet Take 1 tablet (25 mg) by mouth daily 90 tablet 3       ALLERGIES   No Known Allergies    PAST MEDICAL HISTORY:  Past Medical History:   Diagnosis  Date     Benign essential hypertension      Coronary artery disease     2018 CABG     Heart disease      Ischemic cardiomyopathy 2018    echo 2018- EF 35%     Mixed hyperlipidemia        PAST SURGICAL HISTORY:  Past Surgical History:   Procedure Laterality Date     APPENDECTOMY       BYPASS GRAFT ARTERY CORONARY N/A 2018    Procedure: CORONARY ARTERY BYPASS GRAFTING x 3 (LIMA - LAD; SV - OM; SV - PDA) WITH ENDOSCOPIC VEIN HARVEST ON THE LEFT LOWER EXTREMITY    (ON PUMP OXYGENATOR ; BAL BY KEYA)   ;  Surgeon: Sander Wan MD;  Location:  OR     ENT SURGERY      tonsillectomy       FAMILY HISTORY:  Family History   Problem Relation Age of Onset     Coronary Artery Disease Mother      Cerebrovascular Disease Mother      Myocardial Infarction Mother      Family History Negative Father      Unknown/Adopted Maternal Grandmother      Unknown/Adopted Maternal Grandfather      Unknown/Adopted Paternal Grandmother      Unknown/Adopted Paternal Grandfather      Heart Surgery Brother         bypass     Heart Surgery Brother         bypass     Heart Surgery Brother         bypass       SOCIAL HISTORY:  Social History     Socioeconomic History     Marital status:      Spouse name: None     Number of children: None     Years of education: None     Highest education level: None   Occupational History     None   Social Needs     Financial resource strain: None     Food insecurity:     Worry: None     Inability: None     Transportation needs:     Medical: None     Non-medical: None   Tobacco Use     Smoking status: Former Smoker     Start date: 1965     Last attempt to quit: 2018     Years since quittin.6     Smokeless tobacco: Never Used     Tobacco comment: 18: last cigarette. 1/2 ppd. Off/on for 50 years   Substance and Sexual Activity     Alcohol use: No     Drug use: No     Sexual activity: None   Lifestyle     Physical activity:     Days per week: None     Minutes per session:  None     Stress: None   Relationships     Social connections:     Talks on phone: None     Gets together: None     Attends Oriental orthodox service: None     Active member of club or organization: None     Attends meetings of clubs or organizations: None     Relationship status: None     Intimate partner violence:     Fear of current or ex partner: None     Emotionally abused: None     Physically abused: None     Forced sexual activity: None   Other Topics Concern     Parent/sibling w/ CABG, MI or angioplasty before 65F 55M? Not Asked   Social History Narrative     None       Review of Systems:  Skin:  Negative       Eyes:  Positive for glasses    ENT:  Negative      Respiratory:  Negative       Cardiovascular:    Positive for;fatigue;dizziness;lightheadedness    Gastroenterology: Negative      Genitourinary:  Negative      Musculoskeletal:  Negative      Neurologic:  Negative      Psychiatric:  Negative      Heme/Lymph/Imm:  Negative      Endocrine:  Negative        Physical Exam:  Vitals: /76   Pulse 83   Wt 105.7 kg (233 lb)   BMI 32.50 kg/m    Constitutional: awake, alert, no distress  Skin: Warm and dry to touch  Head: Normocephalic, atraumatic  Eyes: Conjunctivae and lids unremarkable, sclera white  ENT: No pallor or cyanosis  Respiratory: Normal breath sounds, clear to auscultation  Cardiac: Regular rate and rhythm, S1-S2 normal.  No murmurs gallops or rubs.   No pedal edema.   Extremities and musculoskeletal: No gross motor deficit  Neurological.  Affect normal  Psych: Alert and oriented x3    Recent Lab Results:  LIPID RESULTS:  Lab Results   Component Value Date    CHOL 261 (H) 11/05/2018    HDL 36 (L) 11/05/2018     (H) 11/05/2018    TRIG 128 11/05/2018       LIVER ENZYME RESULTS:  Lab Results   Component Value Date    AST 34 11/04/2018    ALT 19 11/04/2018       CBC RESULTS:  Lab Results   Component Value Date    WBC 8.5 11/11/2018    RBC 2.76 (L) 11/11/2018    HGB 10.4 (L) 12/05/2018    HCT  28.3 (L) 11/11/2018     (H) 11/11/2018    MCH 33.7 (H) 11/11/2018    MCHC 32.9 11/11/2018    RDW 12.9 11/11/2018     11/11/2018       BMP RESULTS:  Lab Results   Component Value Date     12/12/2018    POTASSIUM 4.4 12/12/2018    CHLORIDE 104 12/12/2018    CO2 29 12/12/2018    ANIONGAP 9.4 12/12/2018     (H) 12/12/2018    BUN 22 12/12/2018    CR 1.17 12/12/2018    GFRESTIMATED 62 12/12/2018    GFRESTBLACK 75 12/12/2018    CONOR 9.6 12/12/2018        A1C RESULTS:  Lab Results   Component Value Date    A1C 5.9 (H) 11/06/2018       INR RESULTS:  Lab Results   Component Value Date    INR 1.37 (H) 11/06/2018    INR 1.78 (H) 11/06/2018       CC  Naseem Ramirez MD  [facility]  [address] [place] [id number]    All medical records were reviewed in detail and discussed with the patient. Greater than 30 mins were spent with the patient, 50% of this time was spent on counseling and coordination of care.  After visit summary was printed and given to the patient.

## 2019-06-24 NOTE — LETTER
6/24/2019    Naseem Ramirez MD  [facility] [address]  [place]  [facility] [address]    RE: David Richard Schoenecker       Dear Colleague,    I had the pleasure of seeing David Richard Schoenecker in the [facility].    HPI and Plan:   Today I had the pleasure of seeing David Richard Schoenecker at [facility] in [place]. He is a pleasant 68 year old patient with a past medical history of CAD S/P CABG with LIMA to LAD, SVG to OM and PDA, ischemic cardiomyopathy with ejection fraction of 35%, hypertension, hyperlipidemia and mild to moderate bilateral carotid stenosis who is here in the clinic to establish care after with me.  He was previously seen by Marjorie Monroy on 12/20/2018 after undergoing CABG.    The patient has been doing well since his prior clinic visit.  He is underwent cardiac rehabitation and has been tolerating it well.  He does describe an episode of hypotension when he was trying to have his daughter move.  His daughter took his blood pressure because he was fatigued and he was found to have blood pressure of 70/40.  He did feel tired and slightly dizzy at that time.  He continues to take 20 mg of Lasix since hospital discharge.  During the clinic his blood pressure is 128/76 which he thinks is unusually high.    He otherwise is doing well and does not report any chest pain or dyspnea on exertion.  His last echocardiogram was on 12/11/2018 prior to visit with Tiffani and showed mild increase in ejection fraction from 25-30 to 35%.  There was inferior, inferolateral and lateral wall hypokinesis.    Assessment and plan    1.  CAD status post CABG x3  2.  Moderate carotid artery disease  3.  Hyperlipidemia  4.  Ischemic cardiomyopathy with ejection fraction of 35%  5.  Hypotension    Discussion    The patient is currently on 325 mg of aspirin. I will discontinue that and start him on 81 mg.  I will also uptitrate Lipitor from 40 to 80  mg.  His last LDL cholesterol was 199 mg/dL at the time of initial hospital presentation.  I will recheck fasting lipid profile prior to uptitrating Lipitor.  If the LDL continues to be elevated on maximal dose of Lipitor I will consider adding Zetia.  As far as moderate carotid artery disease is concerned I will continue aggressive medical management to prevent progression of the disease.  For ischemic cardia myopathy I want him to be on half dose of beta-blocker and ACE inhibitor.  However, I am limited by hypotension.  Will discontinue Lasix which I think is making him dehydrated and causing hypotension and reevaluate him.  I told him to take his blood pressure every morning and get back to us in 2 to 3 weeks.  If his blood pressures found to be good we can uptitrate lisinopril to 5 mg.  I will also repeat a transthoracic echocardiogram to reevaluate ejection fraction.  If his ejection fraction continues to be low he may require ICD placement for primary prevention.    Plan  1.  Discontinue to 325 mg aspirin start aspirin 81 mg daily  2.  Increase Lipitor to 80 mg  3.  Limited echocardiogram to evaluate LV ejection fraction  4.  Follow-up with Bridgett Mehta in [facility] in 3 months  5.  Follow-up with me in 6 months  6.  Discontinue Lasix.  Check your blood pressure in a.m. daily.  Report back with the blood pressure measurements in 2-3 weeks.    Thank you for allowing me to participate in the care of David Richard Schoenecker Rupesh Ranjan, MD  Cardiology    CURRENT MEDICATIONS:  Current Outpatient Medications   Medication Sig Dispense Refill     aspirin 325 MG EC tablet Take 1 tablet (325 mg) by mouth daily 60 tablet 0     atorvastatin (LIPITOR) 40 MG tablet Take 1 tablet (40 mg) by mouth every evening 90 tablet 3     furosemide (LASIX) 20 MG tablet Take 1 tablet (20 mg) by mouth daily 90 tablet 3     lisinopril (PRINIVIL/ZESTRIL) 2.5 MG tablet Take 1 tablet (2.5 mg) by mouth daily 90 tablet  3     loratadine (CLARITIN) 10 MG tablet Take 10 mg by mouth daily       metoprolol succinate (TOPROL-XL) 25 MG 24 hr tablet Take 1 tablet (25 mg) by mouth daily 90 tablet 3       ALLERGIES   No Known Allergies    PAST MEDICAL HISTORY:  Past Medical History:   Diagnosis Date     Benign essential hypertension      Coronary artery disease     2018 CABG     Heart disease      Ischemic cardiomyopathy 2018    echo 2018- EF 35%     Mixed hyperlipidemia        PAST SURGICAL HISTORY:  Past Surgical History:   Procedure Laterality Date     APPENDECTOMY       BYPASS GRAFT ARTERY CORONARY N/A 2018    Procedure: CORONARY ARTERY BYPASS GRAFTING x 3 (LIMA - LAD; SV - OM; SV - PDA) WITH ENDOSCOPIC VEIN HARVEST ON THE LEFT LOWER EXTREMITY    (ON PUMP OXYGENATOR ; BAL BY KEYA)   ;  Surgeon: Sander Wan MD;  Location:  OR     ENT SURGERY      tonsillectomy       FAMILY HISTORY:  Family History   Problem Relation Age of Onset     Coronary Artery Disease Mother      Cerebrovascular Disease Mother      Myocardial Infarction Mother      Family History Negative Father      Unknown/Adopted Maternal Grandmother      Unknown/Adopted Maternal Grandfather      Unknown/Adopted Paternal Grandmother      Unknown/Adopted Paternal Grandfather      Heart Surgery Brother         bypass     Heart Surgery Brother         bypass     Heart Surgery Brother         bypass       SOCIAL HISTORY:  Social History     Socioeconomic History     Marital status:      Spouse name: None     Number of children: None     Years of education: None     Highest education level: None   Occupational History     None   Social Needs     Financial resource strain: None     Food insecurity:     Worry: None     Inability: None     Transportation needs:     Medical: None     Non-medical: None   Tobacco Use     Smoking status: Former Smoker     Start date: 1965     Last attempt to quit: 2018     Years since quittin.6     Smokeless  tobacco: Never Used     Tobacco comment: 11/4/18: last cigarette. 1/2 ppd. Off/on for 50 years   Substance and Sexual Activity     Alcohol use: No     Drug use: No     Sexual activity: None   Lifestyle     Physical activity:     Days per week: None     Minutes per session: None     Stress: None   Relationships     Social connections:     Talks on phone: None     Gets together: None     Attends [employer] witness service: None     Active member of club or organization: None     Attends meetings of clubs or organizations: None     Relationship status: None     Intimate partner violence:     Fear of current or ex partner: None     Emotionally abused: None     Physically abused: None     Forced sexual activity: None   Other Topics Concern     Parent/sibling w/ CABG, MI or angioplasty before 65F 55M? Not Asked   Social History Narrative     None       Review of Systems:  Skin:  Negative       Eyes:  Positive for glasses    ENT:  Negative      Respiratory:  Negative       Cardiovascular:    Positive for;fatigue;dizziness;lightheadedness    Gastroenterology: Negative      Genitourinary:  Negative      Musculoskeletal:  Negative      Neurologic:  Negative      Psychiatric:  Negative      Heme/Lymph/Imm:  Negative      Endocrine:  Negative        Physical Exam:  Vitals: /76   Pulse 83   Wt 105.7 kg (233 lb)   BMI 32.50 kg/m     Constitutional: awake, alert, no distress  Skin: Warm and dry to touch  Head: Normocephalic, atraumatic  Eyes: Conjunctivae and lids unremarkable, sclera white  ENT: No pallor or cyanosis  Respiratory: Normal breath sounds, clear to auscultation  Cardiac: Regular rate and rhythm, S1-S2 normal.  No murmurs gallops or rubs.   No pedal edema.   Extremities and musculoskeletal: No gross motor deficit  Neurological.  Affect normal  Psych: Alert and oriented x3    Recent Lab Results:  LIPID RESULTS:  Lab Results   Component Value Date    CHOL 261 (H) 11/05/2018    HDL 36 (L) 11/05/2018     (H)  11/05/2018    TRIG 128 11/05/2018       LIVER ENZYME RESULTS:  Lab Results   Component Value Date    AST 34 11/04/2018    ALT 19 11/04/2018       CBC RESULTS:  Lab Results   Component Value Date    WBC 8.5 11/11/2018    RBC 2.76 (L) 11/11/2018    HGB 10.4 (L) 12/05/2018    HCT 28.3 (L) 11/11/2018     (H) 11/11/2018    MCH 33.7 (H) 11/11/2018    MCHC 32.9 11/11/2018    RDW 12.9 11/11/2018     11/11/2018       BMP RESULTS:  Lab Results   Component Value Date     12/12/2018    POTASSIUM 4.4 12/12/2018    CHLORIDE 104 12/12/2018    CO2 29 12/12/2018    ANIONGAP 9.4 12/12/2018     (H) 12/12/2018    BUN 22 12/12/2018    CR 1.17 12/12/2018    GFRESTIMATED 62 12/12/2018    GFRESTBLACK 75 12/12/2018    CONOR 9.6 12/12/2018        A1C RESULTS:  Lab Results   Component Value Date    A1C 5.9 (H) 11/06/2018       INR RESULTS:  Lab Results   Component Value Date    INR 1.37 (H) 11/06/2018    INR 1.78 (H) 11/06/2018         All medical records were reviewed in detail and discussed with the patient. Greater than 30 mins were spent with the patient, 50% of this time was spent on counseling and coordination of care.  After visit summary was printed and given to the patient.      Thank you for allowing me to participate in the care of your patient.    Sincerely,     Ramses Felix MD     [facility]

## 2019-06-24 NOTE — PATIENT INSTRUCTIONS
1. Repeat fasting lipid profile.   2. Increase Lipitor to 80 mg po daily  3. Stop Lasix.   4. Check your BP daily in the AM or if your are lightheaded.

## 2019-06-24 NOTE — LETTER
6/24/2019    Naseem Ramirez MD  [facility] [address]  [place]  [facility] [address]    RE: David Richard Schoenecker       Dear Colleague,    I had the pleasure of seeing David Richard Schoenecker in the [facility].    HPI and Plan:   Today I had the pleasure of seeing David Richard Schoenecker at [facility] in [place]. He is a pleasant 68 year old patient with a past medical history of CAD S/P CABG with LIMA to LAD, SVG to OM and PDA, ischemic cardiomyopathy with ejection fraction of 35%, hypertension, hyperlipidemia and mild to moderate bilateral carotid stenosis who is here in the clinic to establish care after with me.  He was previously seen by Marjorie Monroy on 12/20/2018 after undergoing CABG.    The patient has been doing well since his prior clinic visit.  He is underwent cardiac rehabitation and has been tolerating it well.  He does describe an episode of hypotension when he was trying to have his daughter move.  His daughter took his blood pressure because he was fatigued and he was found to have blood pressure of 70/40.  He did feel tired and slightly dizzy at that time.  He continues to take 20 mg of Lasix since hospital discharge.  During the clinic his blood pressure is 128/76 which he thinks is unusually high.    He otherwise is doing well and does not report any chest pain or dyspnea on exertion.  His last echocardiogram was on 12/11/2018 prior to visit with Tiffani and showed mild increase in ejection fraction from 25-30 to 35%.  There was inferior, inferolateral and lateral wall hypokinesis.    Assessment and plan    1.  CAD status post CABG x3  2.  Moderate carotid artery disease  3.  Hyperlipidemia  4.  Ischemic cardiomyopathy with ejection fraction of 35%  5.  Hypotension    Discussion    The patient is currently on 325 mg of aspirin. I will discontinue that and start him on 81 mg.  I will also uptitrate Lipitor from 40 to 80  mg.  His last LDL cholesterol was 199 mg/dL at the time of initial hospital presentation.  I will recheck fasting lipid profile prior to uptitrating Lipitor.  If the LDL continues to be elevated on maximal dose of Lipitor I will consider adding Zetia.  As far as moderate carotid artery disease is concerned I will continue aggressive medical management to prevent progression of the disease.  For ischemic cardia myopathy I want him to be on half dose of beta-blocker and ACE inhibitor.  However, I am limited by hypotension.  Will discontinue Lasix which I think is making him dehydrated and causing hypotension and reevaluate him.  I told him to take his blood pressure every morning and get back to us in 2 to 3 weeks.  If his blood pressures found to be good we can uptitrate lisinopril to 5 mg.  I will also repeat a transthoracic echocardiogram to reevaluate ejection fraction.  If his ejection fraction continues to be low he may require ICD placement for primary prevention.    Plan  1.  Discontinue to 325 mg aspirin start aspirin 81 mg daily  2.  Increase Lipitor to 80 mg  3.  Limited echocardiogram to evaluate LV ejection fraction  4.  Follow-up with Bridgett Mehta in [facility] in 3 months  5.  Follow-up with me in 6 months  6.  Discontinue Lasix.  Check your blood pressure in a.m. daily.  Report back with the blood pressure measurements in 2-3 weeks.    Thank you for allowing me to participate in the care of David Richard Schoenecker Rupesh Ranjan, MD  Cardiology    CURRENT MEDICATIONS:  Current Outpatient Medications   Medication Sig Dispense Refill     aspirin 325 MG EC tablet Take 1 tablet (325 mg) by mouth daily 60 tablet 0     atorvastatin (LIPITOR) 40 MG tablet Take 1 tablet (40 mg) by mouth every evening 90 tablet 3     furosemide (LASIX) 20 MG tablet Take 1 tablet (20 mg) by mouth daily 90 tablet 3     lisinopril (PRINIVIL/ZESTRIL) 2.5 MG tablet Take 1 tablet (2.5 mg) by mouth daily 90 tablet  3     loratadine (CLARITIN) 10 MG tablet Take 10 mg by mouth daily       metoprolol succinate (TOPROL-XL) 25 MG 24 hr tablet Take 1 tablet (25 mg) by mouth daily 90 tablet 3       ALLERGIES   No Known Allergies    PAST MEDICAL HISTORY:  Past Medical History:   Diagnosis Date     Benign essential hypertension      Coronary artery disease     2018 CABG     Heart disease      Ischemic cardiomyopathy 2018    echo 2018- EF 35%     Mixed hyperlipidemia        PAST SURGICAL HISTORY:  Past Surgical History:   Procedure Laterality Date     APPENDECTOMY       BYPASS GRAFT ARTERY CORONARY N/A 2018    Procedure: CORONARY ARTERY BYPASS GRAFTING x 3 (LIMA - LAD; SV - OM; SV - PDA) WITH ENDOSCOPIC VEIN HARVEST ON THE LEFT LOWER EXTREMITY    (ON PUMP OXYGENATOR ; BAL BY KEYA)   ;  Surgeon: Sander Wan MD;  Location:  OR     ENT SURGERY      tonsillectomy       FAMILY HISTORY:  Family History   Problem Relation Age of Onset     Coronary Artery Disease Mother      Cerebrovascular Disease Mother      Myocardial Infarction Mother      Family History Negative Father      Unknown/Adopted Maternal Grandmother      Unknown/Adopted Maternal Grandfather      Unknown/Adopted Paternal Grandmother      Unknown/Adopted Paternal Grandfather      Heart Surgery Brother         bypass     Heart Surgery Brother         bypass     Heart Surgery Brother         bypass       SOCIAL HISTORY:  Social History     Socioeconomic History     Marital status:      Spouse name: None     Number of children: None     Years of education: None     Highest education level: None   Occupational History     None   Social Needs     Financial resource strain: None     Food insecurity:     Worry: None     Inability: None     Transportation needs:     Medical: None     Non-medical: None   Tobacco Use     Smoking status: Former Smoker     Start date: 1965     Last attempt to quit: 2018     Years since quittin.6     Smokeless  tobacco: Never Used     Tobacco comment: 11/4/18: last cigarette. 1/2 ppd. Off/on for 50 years   Substance and Sexual Activity     Alcohol use: No     Drug use: No     Sexual activity: None   Lifestyle     Physical activity:     Days per week: None     Minutes per session: None     Stress: None   Relationships     Social connections:     Talks on phone: None     Gets together: None     Attends Orthodox service: None     Active member of club or organization: None     Attends meetings of clubs or organizations: None     Relationship status: None     Intimate partner violence:     Fear of current or ex partner: None     Emotionally abused: None     Physically abused: None     Forced sexual activity: None   Other Topics Concern     Parent/sibling w/ CABG, MI or angioplasty before 65F 55M? Not Asked   Social History Narrative     None       Review of Systems:  Skin:  Negative       Eyes:  Positive for glasses    ENT:  Negative      Respiratory:  Negative       Cardiovascular:    Positive for;fatigue;dizziness;lightheadedness    Gastroenterology: Negative      Genitourinary:  Negative      Musculoskeletal:  Negative      Neurologic:  Negative      Psychiatric:  Negative      Heme/Lymph/Imm:  Negative      Endocrine:  Negative        Physical Exam:  Vitals: /76   Pulse 83   Wt 105.7 kg (233 lb)   BMI 32.50 kg/m     Constitutional: awake, alert, no distress  Skin: Warm and dry to touch  Head: Normocephalic, atraumatic  Eyes: Conjunctivae and lids unremarkable, sclera white  ENT: No pallor or cyanosis  Respiratory: Normal breath sounds, clear to auscultation  Cardiac: Regular rate and rhythm, S1-S2 normal.  No murmurs gallops or rubs.   No pedal edema.   Extremities and musculoskeletal: No gross motor deficit  Neurological.  Affect normal  Psych: Alert and oriented x3    Recent Lab Results:  LIPID RESULTS:  Lab Results   Component Value Date    CHOL 261 (H) 11/05/2018    HDL 36 (L) 11/05/2018     (H)  11/05/2018    TRIG 128 11/05/2018       LIVER ENZYME RESULTS:  Lab Results   Component Value Date    AST 34 11/04/2018    ALT 19 11/04/2018       CBC RESULTS:  Lab Results   Component Value Date    WBC 8.5 11/11/2018    RBC 2.76 (L) 11/11/2018    HGB 10.4 (L) 12/05/2018    HCT 28.3 (L) 11/11/2018     (H) 11/11/2018    MCH 33.7 (H) 11/11/2018    MCHC 32.9 11/11/2018    RDW 12.9 11/11/2018     11/11/2018       BMP RESULTS:  Lab Results   Component Value Date     12/12/2018    POTASSIUM 4.4 12/12/2018    CHLORIDE 104 12/12/2018    CO2 29 12/12/2018    ANIONGAP 9.4 12/12/2018     (H) 12/12/2018    BUN 22 12/12/2018    CR 1.17 12/12/2018    GFRESTIMATED 62 12/12/2018    GFRESTBLACK 75 12/12/2018    CONOR 9.6 12/12/2018        A1C RESULTS:  Lab Results   Component Value Date    A1C 5.9 (H) 11/06/2018       INR RESULTS:  Lab Results   Component Value Date    INR 1.37 (H) 11/06/2018    INR 1.78 (H) 11/06/2018       CC  Naseem Ramirez MD  [place] [address] [address] [place] [address]    All medical records were reviewed in detail and discussed with the patient. Greater than 30 mins were spent with the patient, 50% of this time was spent on counseling and coordination of care.  After visit summary was printed and given to the patient.      Thank you for allowing me to participate in the care of your patient.      Sincerely,     Ramses Felix MD     [facility]    cc:   Naseem Ramirez MD  [place] [address] [address] [place] [address]

## 2019-07-01 ENCOUNTER — HOSPITAL ENCOUNTER (OUTPATIENT)
Dept: CARDIOLOGY | Facility: CLINIC | Age: 69
Discharge: HOME | End: 2019-07-01
Attending: INTERNAL MEDICINE | Admitting: INTERNAL MEDICINE
Payer: COMMERCIAL

## 2019-07-01 DIAGNOSIS — Z95.1 S/P CABG (CORONARY ARTERY BYPASS GRAFT): ICD-10-CM

## 2019-07-01 LAB
ALT SERPL W P-5'-P-CCNC: 26 U/L (ref 0–70)
CHOLEST SERPL-MCNC: 173 MG/DL
HDLC SERPL-MCNC: 38 MG/DL
LDLC SERPL CALC-MCNC: 119 MG/DL
NONHDLC SERPL-MCNC: 135 MG/DL
TRIGL SERPL-MCNC: 80 MG/DL

## 2019-07-01 PROCEDURE — 84460 ALANINE AMINO (ALT) (SGPT): CPT | Performed by: INTERNAL MEDICINE

## 2019-07-01 PROCEDURE — 93321 DOPPLER ECHO F-UP/LMTD STD: CPT | Mod: 26 | Performed by: INTERNAL MEDICINE

## 2019-07-01 PROCEDURE — 25500064 ZZH RX 255 OP 636: Performed by: INTERNAL MEDICINE

## 2019-07-01 PROCEDURE — 93325 DOPPLER ECHO COLOR FLOW MAPG: CPT | Mod: 26 | Performed by: INTERNAL MEDICINE

## 2019-07-01 PROCEDURE — 36415 COLL VENOUS BLD VENIPUNCTURE: CPT | Performed by: INTERNAL MEDICINE

## 2019-07-01 PROCEDURE — 80061 LIPID PANEL: CPT | Performed by: INTERNAL MEDICINE

## 2019-07-01 PROCEDURE — 40000264 ECHOCARDIOGRAM LIMITED

## 2019-07-01 PROCEDURE — 93308 TTE F-UP OR LMTD: CPT | Mod: 26 | Performed by: INTERNAL MEDICINE

## 2019-07-01 RX ADMIN — HUMAN ALBUMIN MICROSPHERES AND PERFLUTREN 2 ML: 10; .22 INJECTION, SOLUTION INTRAVENOUS at 08:19

## 2019-07-01 NOTE — RESULT ENCOUNTER NOTE
Results noted: EF 50-55% (35% on 12/2018 echo); mild WMAs as described; no valve disease. Notified pt of results.  Follow up with Bridgett Mehta NP on 9/9/19

## 2019-08-20 DIAGNOSIS — Z95.1 S/P CABG (CORONARY ARTERY BYPASS GRAFT): Primary | ICD-10-CM

## 2019-08-20 DIAGNOSIS — E78.5 HYPERLIPIDEMIA LDL GOAL <70: ICD-10-CM

## 2019-09-23 ENCOUNTER — OFFICE VISIT (OUTPATIENT)
Dept: CARDIOLOGY | Facility: CLINIC | Age: 69
End: 2019-09-23
Payer: COMMERCIAL

## 2019-09-23 VITALS
SYSTOLIC BLOOD PRESSURE: 99 MMHG | WEIGHT: 241 LBS | DIASTOLIC BLOOD PRESSURE: 57 MMHG | BODY MASS INDEX: 33.61 KG/M2 | OXYGEN SATURATION: 95 % | HEART RATE: 71 BPM

## 2019-09-23 DIAGNOSIS — I10 BENIGN ESSENTIAL HYPERTENSION: ICD-10-CM

## 2019-09-23 DIAGNOSIS — E78.5 HYPERLIPIDEMIA LDL GOAL <70: ICD-10-CM

## 2019-09-23 DIAGNOSIS — Z95.1 S/P CABG (CORONARY ARTERY BYPASS GRAFT): ICD-10-CM

## 2019-09-23 DIAGNOSIS — I65.23 BILATERAL CAROTID ARTERY STENOSIS: ICD-10-CM

## 2019-09-23 DIAGNOSIS — Z86.79 H/O CARDIOMYOPATHY: ICD-10-CM

## 2019-09-23 DIAGNOSIS — E78.5 HYPERLIPIDEMIA LDL GOAL <70: Primary | ICD-10-CM

## 2019-09-23 DIAGNOSIS — I25.810 CORONARY ARTERY DISEASE INVOLVING CORONARY BYPASS GRAFT OF NATIVE HEART WITHOUT ANGINA PECTORIS: ICD-10-CM

## 2019-09-23 LAB
ALT SERPL W P-5'-P-CCNC: 23 U/L (ref 0–70)
CHOLEST SERPL-MCNC: 176 MG/DL
HDLC SERPL-MCNC: 37 MG/DL
LDLC SERPL CALC-MCNC: 128 MG/DL
NONHDLC SERPL-MCNC: 139 MG/DL
TRIGL SERPL-MCNC: 54 MG/DL

## 2019-09-23 PROCEDURE — 99214 OFFICE O/P EST MOD 30 MIN: CPT | Performed by: NURSE PRACTITIONER

## 2019-09-23 PROCEDURE — 36415 COLL VENOUS BLD VENIPUNCTURE: CPT | Performed by: INTERNAL MEDICINE

## 2019-09-23 PROCEDURE — 84460 ALANINE AMINO (ALT) (SGPT): CPT | Performed by: INTERNAL MEDICINE

## 2019-09-23 PROCEDURE — 80061 LIPID PANEL: CPT | Performed by: INTERNAL MEDICINE

## 2019-09-23 RX ORDER — CALCIUM CARBONATE 500 MG/1
1 TABLET, CHEWABLE ORAL PRN
COMMUNITY

## 2019-09-23 NOTE — PROGRESS NOTES
Cardiology Clinic Progress Note  David Richard Schoenecker MRN# [id number]   YOB: 1950 Age: 69 year old      Reason For Visit: 3 month f/u    Primary Cardiologist:    Dr. Felix          History of Presenting Illness:      David Richard Schoenecker is a pleasant 69 year old patient with a past cardiac history significant for CAD with CABG 11/2018, ischemic cardiomyopathy with chronic heart failure, hypertension, hyperlipidemia, and carotid artery disease.     Pt was last seen by Dr. Felix in June 2019. He was participating in cardiac rehabilitation without difficulty.  He had an episode of hypotension with BP 70/40  associated with fatigue  and lightheadedness.  Prior echocardiogram December 2018 showed mild increase in LVEF from 25-30 to 35% and continued with inferior, inferolateral, lateral wall hypokinesis. His atorvastatin was increased to 80 mg daily for better lipid management.  He discontinued Lasix which was felt to be contributing to dehydration.    Pt presents today for 3 month follow-up.  Echocardiogram 7/1/2019 showed significant improvement in LVEF up to 50-55%, mild inferior and apical wall hypokinesis, and no significant valvular disease. These results were reviewed with him today.     After increasing atorvastatin to 80 mg daily, his lipid profile has actually worsened. He denies any side effects after increasing atorvastatin and has been taking this consistently.  Lipid profile today showing total cholesterol 176 HDL 37 and  and triglycerides 54.  He does tell me that he had some Doritos overnight and is wondering if this would interfere with accurate testing.  He is agreeable to retesting while fasting.  If he continues with elevated LDL, we have discussed PCSk9 inhibitor.  He is not agreeable to starting that at this time but will consider in the future.  He has tried Zetia previously but due to cost was unable to continue.  His weight today in the clinic is up 12 pounds  and he believes this is due to calories. He denies any anginal or heart failure symptoms.  Blood pressure is well-controlled.  He misunderstood at his last appointment and thought he was to discontinue aspirin.  Therefore, he has not been taking any but is agreeable to restarting baby aspirin.  He does have complaints of hernia on the lower portion of his sternal incision.  I've recommended that he call CV surgery or follow-up with his PCP regarding this. Patient reports no chest pain, shortness of breath, PND, orthopnea, presyncope, syncope, edema, heart racing, or palpitations.    Current Cardiac Medications   Atorvastatin 80 mg daily  Lisinopril 2.5 mg daily  Metoprolol XL 25 mg daily                   Assessment and Plan:       Plan  1.  Repeat fasting lipid profile - we will call with the results  2.  Follow-up with Dr. Felix in six months    1. CAD    CABG 11/2018×3 (LIMA to LAD, SVG to OM  And PDA)    No angina    Continue statin, aspirin, ACE inhibitor, beta blocker      2. H/o Ischemic cardiomyopathy with systolic heart failure    LVEF 50-55% 7/2019, originally 25-30% improved to 35% 12/2018    No signs of heart failure    Continue ACE inhibitor, beta blocker    Was having hypotension and dehydration with Lasix daily      3. hyperlipidemia    Last  on 9/2019    atorvastatin was increased to 80 mg daily without improvement     Consider PCSK9 if needed- Pt declining     No zetia d/t cost       4. carotid artery disease    CTa 11/2018 60% stenosis ADAM, 30% LICA    Continue statin, aspirin    Follow with ultrasound       5. hypertension    controlled    Continue lisinopril, metoprolol         Thank you for allowing me to participate in this delightful patient's care.      This note was completed in part using Dragon voice recognition software. Although reviewed after completion, some word and grammatical errors may occur.    Bridgett Aguiar, APRN CNP, APRN, CNP           Data:   All  laboratory data reviewed        HPI and Plan:   See dictation    Orders Placed This Encounter   Procedures     Lipid Profile     Follow-Up with Cardiologist       Orders Placed This Encounter   Medications     calcium carbonate (TUMS) 500 MG chewable tablet     Sig: Take 1 chew tab by mouth as needed for heartburn     Naproxen Sodium (ALEVE PO)     Sig: Take by mouth as needed for moderate pain     aspirin (ASA) 81 MG tablet     Sig: Take 1 tablet (81 mg) by mouth daily       There are no discontinued medications.      Encounter Diagnoses   Name Primary?     Hyperlipidemia LDL goal <70 Yes     Coronary artery disease involving coronary bypass graft of native heart without angina pectoris      H/O cardiomyopathy      Benign essential hypertension      Bilateral carotid artery stenosis        CURRENT MEDICATIONS:  Current Outpatient Medications   Medication Sig Dispense Refill     aspirin (ASA) 81 MG tablet Take 1 tablet (81 mg) by mouth daily       atorvastatin (LIPITOR) 80 MG tablet Take 1 tablet (80 mg) by mouth every evening 90 tablet 3     calcium carbonate (TUMS) 500 MG chewable tablet Take 1 chew tab by mouth as needed for heartburn       lisinopril (PRINIVIL/ZESTRIL) 2.5 MG tablet Take 1 tablet (2.5 mg) by mouth daily 90 tablet 3     loratadine (CLARITIN) 10 MG tablet Take 10 mg by mouth daily       metoprolol succinate (TOPROL-XL) 25 MG 24 hr tablet Take 1 tablet (25 mg) by mouth daily 90 tablet 3     Naproxen Sodium (ALEVE PO) Take by mouth as needed for moderate pain         ALLERGIES   No Known Allergies    PAST MEDICAL HISTORY:  Past Medical History:   Diagnosis Date     Benign essential hypertension      Coronary artery disease     11/2018 CABG     Heart disease      Ischemic cardiomyopathy 12/2018    echo 12/2018- EF 35%     Mixed hyperlipidemia        PAST SURGICAL HISTORY:  Past Surgical History:   Procedure Laterality Date     APPENDECTOMY       BYPASS GRAFT ARTERY CORONARY N/A 11/6/2018     Procedure: CORONARY ARTERY BYPASS GRAFTING x 3 (LIMA - LAD; SV - OM; SV - PDA) WITH ENDOSCOPIC VEIN HARVEST ON THE LEFT LOWER EXTREMITY    (ON PUMP OXYGENATOR ; BAL BY KEYA)   ;  Surgeon: Sander Wan MD;  Location:  OR     ENT SURGERY      tonsillectomy       FAMILY HISTORY:  Family History   Problem Relation Age of Onset     Coronary Artery Disease Mother      Cerebrovascular Disease Mother      Myocardial Infarction Mother      Family History Negative Father      Unknown/Adopted Maternal Grandmother      Unknown/Adopted Maternal Grandfather      Unknown/Adopted Paternal Grandmother      Unknown/Adopted Paternal Grandfather      Heart Surgery Brother         bypass     Heart Surgery Brother         bypass     Heart Surgery Brother         bypass       SOCIAL HISTORY:  Social History     Socioeconomic History     Marital status:      Spouse name: None     Number of children: None     Years of education: None     Highest education level: None   Occupational History     None   Social Needs     Financial resource strain: None     Food insecurity:     Worry: None     Inability: None     Transportation needs:     Medical: None     Non-medical: None   Tobacco Use     Smoking status: Former Smoker     Start date: 1965     Last attempt to quit: 2018     Years since quittin.8     Smokeless tobacco: Never Used     Tobacco comment: 18: last cigarette. 1/2 ppd. Off/on for 50 years   Substance and Sexual Activity     Alcohol use: No     Drug use: No     Sexual activity: None   Lifestyle     Physical activity:     Days per week: None     Minutes per session: None     Stress: None   Relationships     Social connections:     Talks on phone: None     Gets together: None     Attends Baptism service: None     Active member of club or organization: None     Attends meetings of clubs or organizations: None     Relationship status: None     Intimate partner violence:     Fear of current or ex  partner: None     Emotionally abused: None     Physically abused: None     Forced sexual activity: None   Other Topics Concern     Parent/sibling w/ CABG, MI or angioplasty before 65F 55M? Not Asked   Social History Narrative     None       Review of Systems:  Skin:  Negative       Eyes:  Positive for glasses    ENT:  Negative      Respiratory:  Negative       Cardiovascular:    Positive for;chest pain;edema    Gastroenterology: Positive for heartburn    Genitourinary:  Negative      Musculoskeletal:  Negative      Neurologic:  Negative      Psychiatric:  Negative      Heme/Lymph/Imm:  Negative      Endocrine:  Negative        Physical Exam:  Vitals: BP 99/57   Pulse 71   Wt 109.3 kg (241 lb)   SpO2 95%   BMI 33.61 kg/m      Constitutional:  cooperative, alert and oriented, well developed, well nourished, in no acute distress obese      Skin:  warm and dry to the touch          Head:  normocephalic        Eyes:  sclera white        Lymph:      ENT:  no pallor or cyanosis        Neck:  no stiffness        Respiratory:  clear to auscultation;normal symmetry         Cardiac: regular rhythm;normal S1 and S2                pulses full and equal                                        GI:  abdomen soft        Extremities and Muscular Skeletal:      bilateral LE edema;1+          Neurological:  affect appropriate;no gross motor deficits        Psych:  Alert and Oriented x 3        CC  Ramses Felix MD  [address]  [place] [id number]

## 2019-09-23 NOTE — PATIENT INSTRUCTIONS
"[facility] HEART CARE  [facility]~[address]~[place], [place]~[address]  Thank you for your  Heart Care visit today. If you have questions regarding your visit, please contact your cardiology RN's, Tiffany Manuel, at [phone]. Your provider has recommended the following:  Medication Changes:  RESTART aspirin 81 mg daily   Recommendations:  1. If you want to try the injectable cholesterol medication call us   2. Follow-up with primary provider about hernia.   Follow-up:  1. Repeat fasting lab work   2. See Dr. Felix for cardiology follow up at [facility]: April 2020  Call in Jan, to schedule the appointment.  To schedule a future appointment, we kindly ask that you call cardiology scheduling at [phone] three months prior to requested revisit date.      [facility] cardiology clinic is staffed with \"Advance Practice Providers\". These are our cardiology Physician Assistants and Nurse Practitioners.   Please call cardiology scheduling if you feel you need clinical evaluation with them at any time for any cardiac reason.   Reminder:  For your safety, we ask that you bring in your current medication(s) or an updated list of your medications with you to EACH office visit. Include the medication name, dose of pill on bottle and how you are taking it. Include over-the-counter medications or supplements. Your provider will review this at each visit and plan your care based on your current information.   ~~~~~~~~~~~~~~~~~~~~~~~~~~~~~~~~~~~~~~~  \"[facility]\" [place] telephone numbers for reference:  Cardiology Scheduling~[phone]  Diagnostic Imaging Scheduling~[phone]  Lab Scheduling~[phone]  Anticoagulation Clinic~[phone]  Cardiac Rehabilitation~[phone]  CORE Clinic RN's~[phone] (at [facility])  Cardiology Clinic RN's~[phone] (Tiffany Manuel, DALILA)  ~~~~~~~~~~~~~~~~~~~~~~~~~~~~~~~~~~~~~~~~    "

## 2019-09-23 NOTE — LETTER
9/23/2019    Naseem Ramirez MD  [facility] [id number]  [facility] [address]    RE: Genaro Hernandezeloisa       Dear Colleague,    I had the pleasure of seeing David Richard Schoenecker in the [facility].    Cardiology Clinic Progress Note  David Richard Schoenecker MRN# [id number]   YOB: 1950 Age: 69 year old      Reason For Visit: 3 month f/u    Primary Cardiologist:    Dr. Felix          History of Presenting Illness:      David Richard Schoenecker is a pleasant 69 year old patient with a past cardiac history significant for CAD with CABG 11/2018, ischemic cardiomyopathy with chronic heart failure, hypertension, hyperlipidemia, and carotid artery disease.     Pt was last seen by Dr. Felix in June 2019. He was participating in cardiac rehabilitation without difficulty.  He had an episode of hypotension with BP 70/40  associated with fatigue  and lightheadedness.  Prior echocardiogram December 2018 showed mild increase in LVEF from 25-30 to 35% and continued with inferior, inferolateral, lateral wall hypokinesis. His atorvastatin was increased to 80 mg daily for better lipid management.  He discontinued Lasix which was felt to be contributing to dehydration.    Pt presents today for 3 month follow-up.  Echocardiogram 7/1/2019 showed significant improvement in LVEF up to 50-55%, mild inferior and apical wall hypokinesis, and no significant valvular disease. These results were reviewed with him today.     After increasing atorvastatin to 80 mg daily, his lipid profile has actually worsened. He denies any side effects after increasing atorvastatin and has been taking this consistently.  Lipid profile today showing total cholesterol 176 HDL 37 and  and triglycerides 54.  He does tell me that he had some Doritos overnight and is wondering if this would interfere with accurate testing.  He is agreeable to retesting while fasting.  If  he continues with elevated LDL, we have discussed PCSk9 inhibitor.  He is not agreeable to starting that at this time but will consider in the future.  He has tried Zetia previously but due to cost was unable to continue.  His weight today in the clinic is up 12 pounds and he believes this is due to calories. He denies any anginal or heart failure symptoms.  Blood pressure is well-controlled.  He misunderstood at his last appointment and thought he was to discontinue aspirin.  Therefore, he has not been taking any but is agreeable to restarting baby aspirin.  He does have complaints of hernia on the lower portion of his sternal incision.  I've recommended that he call CV surgery or follow-up with his PCP regarding this. Patient reports no chest pain, shortness of breath, PND, orthopnea, presyncope, syncope, edema, heart racing, or palpitations.    Current Cardiac Medications   Atorvastatin 80 mg daily  Lisinopril 2.5 mg daily  Metoprolol XL 25 mg daily                   Assessment and Plan:       Plan  1.  Repeat fasting lipid profile - we will call with the results  2.  Follow-up with Dr. Felix in six months    1. CAD    CABG 11/2018×3 (LIMA to LAD, SVG to OM  And PDA)    No angina    Continue statin, aspirin, ACE inhibitor, beta blocker      2. H/o Ischemic cardiomyopathy with systolic heart failure    LVEF 50-55% 7/2019, originally 25-30% improved to 35% 12/2018    No signs of heart failure    Continue ACE inhibitor, beta blocker    Was having hypotension and dehydration with Lasix daily      3. hyperlipidemia    Last  on 9/2019    atorvastatin was increased to 80 mg daily without improvement     Consider PCSK9 if needed- Pt declining     No zetia d/t cost       4. carotid artery disease    CTa 11/2018 60% stenosis ADAM, 30% LICA    Continue statin, aspirin    Follow with ultrasound       5. hypertension    controlled    Continue lisinopril, metoprolol         Thank you for allowing me to participate in  this delightful patient's care.      This note was completed in part using Dragon voice recognition software. Although reviewed after completion, some word and grammatical errors may occur.    Bridgett Aguiar, RAVI CNP, APRN, CNP           Data:   All laboratory data reviewed        HPI and Plan:   See dictation    Orders Placed This Encounter   Procedures     Lipid Profile     Follow-Up with Cardiologist       Orders Placed This Encounter   Medications     calcium carbonate (TUMS) 500 MG chewable tablet     Sig: Take 1 chew tab by mouth as needed for heartburn     Naproxen Sodium (ALEVE PO)     Sig: Take by mouth as needed for moderate pain     aspirin (ASA) 81 MG tablet     Sig: Take 1 tablet (81 mg) by mouth daily       There are no discontinued medications.      Encounter Diagnoses   Name Primary?     Hyperlipidemia LDL goal <70 Yes     Coronary artery disease involving coronary bypass graft of native heart without angina pectoris      H/O cardiomyopathy      Benign essential hypertension      Bilateral carotid artery stenosis        CURRENT MEDICATIONS:  Current Outpatient Medications   Medication Sig Dispense Refill     aspirin (ASA) 81 MG tablet Take 1 tablet (81 mg) by mouth daily       atorvastatin (LIPITOR) 80 MG tablet Take 1 tablet (80 mg) by mouth every evening 90 tablet 3     calcium carbonate (TUMS) 500 MG chewable tablet Take 1 chew tab by mouth as needed for heartburn       lisinopril (PRINIVIL/ZESTRIL) 2.5 MG tablet Take 1 tablet (2.5 mg) by mouth daily 90 tablet 3     loratadine (CLARITIN) 10 MG tablet Take 10 mg by mouth daily       metoprolol succinate (TOPROL-XL) 25 MG 24 hr tablet Take 1 tablet (25 mg) by mouth daily 90 tablet 3     Naproxen Sodium (ALEVE PO) Take by mouth as needed for moderate pain         ALLERGIES   No Known Allergies    PAST MEDICAL HISTORY:  Past Medical History:   Diagnosis Date     Benign essential hypertension      Coronary artery disease     11/2018  CABG     Heart disease      Ischemic cardiomyopathy 2018    echo 2018- EF 35%     Mixed hyperlipidemia        PAST SURGICAL HISTORY:  Past Surgical History:   Procedure Laterality Date     APPENDECTOMY       BYPASS GRAFT ARTERY CORONARY N/A 2018    Procedure: CORONARY ARTERY BYPASS GRAFTING x 3 (LIMA - LAD; SV - OM; SV - PDA) WITH ENDOSCOPIC VEIN HARVEST ON THE LEFT LOWER EXTREMITY    (ON PUMP OXYGENATOR ; BAL BY KEYA)   ;  Surgeon: Sander Wan MD;  Location:  OR     ENT SURGERY      tonsillectomy       FAMILY HISTORY:  Family History   Problem Relation Age of Onset     Coronary Artery Disease Mother      Cerebrovascular Disease Mother      Myocardial Infarction Mother      Family History Negative Father      Unknown/Adopted Maternal Grandmother      Unknown/Adopted Maternal Grandfather      Unknown/Adopted Paternal Grandmother      Unknown/Adopted Paternal Grandfather      Heart Surgery Brother         bypass     Heart Surgery Brother         bypass     Heart Surgery Brother         bypass       SOCIAL HISTORY:  Social History     Socioeconomic History     Marital status:      Spouse name: None     Number of children: None     Years of education: None     Highest education level: None   Occupational History     None   Social Needs     Financial resource strain: None     Food insecurity:     Worry: None     Inability: None     Transportation needs:     Medical: None     Non-medical: None   Tobacco Use     Smoking status: Former Smoker     Start date: 1965     Last attempt to quit: 2018     Years since quittin.8     Smokeless tobacco: Never Used     Tobacco comment: 18: last cigarette. /2 ppd. Off/on for 50 years   Substance and Sexual Activity     Alcohol use: No     Drug use: No     Sexual activity: None   Lifestyle     Physical activity:     Days per week: None     Minutes per session: None     Stress: None   Relationships     Social connections:     Talks on  phone: None     Gets together: None     Attends Catholic service: None     Active member of club or organization: None     Attends meetings of clubs or organizations: None     Relationship status: None     Intimate partner violence:     Fear of current or ex partner: None     Emotionally abused: None     Physically abused: None     Forced sexual activity: None   Other Topics Concern     Parent/sibling w/ CABG, MI or angioplasty before 65F 55M? Not Asked   Social History Narrative     None       Review of Systems:  Skin:  Negative       Eyes:  Positive for glasses    ENT:  Negative      Respiratory:  Negative       Cardiovascular:    Positive for;chest pain;edema    Gastroenterology: Positive for heartburn    Genitourinary:  Negative      Musculoskeletal:  Negative      Neurologic:  Negative      Psychiatric:  Negative      Heme/Lymph/Imm:  Negative      Endocrine:  Negative        Physical Exam:  Vitals: BP 99/57   Pulse 71   Wt 109.3 kg (241 lb)   SpO2 95%   BMI 33.61 kg/m       Constitutional:  cooperative, alert and oriented, well developed, well nourished, in no acute distress obese      Skin:  warm and dry to the touch          Head:  normocephalic        Eyes:  sclera white        Lymph:      ENT:  no pallor or cyanosis        Neck:  no stiffness        Respiratory:  clear to auscultation;normal symmetry         Cardiac: regular rhythm;normal S1 and S2                pulses full and equal                                        GI:  abdomen soft        Extremities and Muscular Skeletal:      bilateral LE edema;1+          Neurological:  affect appropriate;no gross motor deficits        Psych:  Alert and Oriented x 3          Thank you for allowing me to participate in the care of your patient.    Sincerely,     RAVI Blood [facility]

## 2019-10-07 DIAGNOSIS — E78.5 HYPERLIPIDEMIA LDL GOAL <70: ICD-10-CM

## 2019-10-07 LAB
CHOLEST SERPL-MCNC: 171 MG/DL
HDLC SERPL-MCNC: 36 MG/DL
LDLC SERPL CALC-MCNC: 118 MG/DL
NONHDLC SERPL-MCNC: 135 MG/DL
TRIGL SERPL-MCNC: 85 MG/DL

## 2019-10-07 PROCEDURE — 80061 LIPID PANEL: CPT | Performed by: NURSE PRACTITIONER

## 2019-10-07 PROCEDURE — 36415 COLL VENOUS BLD VENIPUNCTURE: CPT | Performed by: NURSE PRACTITIONER

## 2019-10-07 NOTE — RESULT ENCOUNTER NOTE
Notes recorded by Bridgett Mehta APRN CNP on 10/7/2019 at 1:50 PM CDT  LDL remains elevated at 118 with goal LDL less than 70.   Previously discussed PCSK9 inhibitors. Let's see if he is agreeable to starting this now. Previously unable to afford Zetia.    **Called pt to review, no answer. [facility] for call back. DALILA Isaac 2:31 PM 10/07/19

## 2019-10-07 NOTE — RESULT ENCOUNTER NOTE
Pt last seen in clinic 9/23/19 by CHARLENE Rea- LDL had worsened, despite recent increase in Atorvastatin to 80mg daily. Per Álvaro NP's note, plan to recheck in a 2 weeks and consider PCSK9 inhibitors if LDL continues to decline. 2 week recheck of Lipids/ALT drawn today 10/7/19. LDL slightly improved. Messaged results to CHARLENE Rea for review.

## 2019-10-15 NOTE — RESULT ENCOUNTER NOTE
Disc results and recommendations with patient. He said he is not interested in a PCSK9 inhibitor and would like to just continue with what he is doing now.

## 2019-12-02 DIAGNOSIS — Z95.1 S/P CABG (CORONARY ARTERY BYPASS GRAFT): ICD-10-CM

## 2019-12-02 RX ORDER — METOPROLOL SUCCINATE 25 MG/1
25 TABLET, EXTENDED RELEASE ORAL DAILY
Qty: 90 TABLET | Refills: 1 | Status: SHIPPED | OUTPATIENT
Start: 2019-12-02 | End: 2020-07-06

## 2019-12-03 DIAGNOSIS — Z95.1 S/P CABG (CORONARY ARTERY BYPASS GRAFT): ICD-10-CM

## 2019-12-03 RX ORDER — LISINOPRIL 2.5 MG/1
2.5 TABLET ORAL DAILY
Qty: 90 TABLET | Refills: 3 | Status: SHIPPED | OUTPATIENT
Start: 2019-12-03 | End: 2020-12-16

## 2020-05-20 ENCOUNTER — VIRTUAL VISIT (OUTPATIENT)
Dept: CARDIOLOGY | Facility: CLINIC | Age: 70
End: 2020-05-20
Attending: NURSE PRACTITIONER
Payer: COMMERCIAL

## 2020-05-20 VITALS — WEIGHT: 235 LBS | BODY MASS INDEX: 32.78 KG/M2

## 2020-05-20 DIAGNOSIS — I65.23 BILATERAL CAROTID ARTERY STENOSIS: ICD-10-CM

## 2020-05-20 DIAGNOSIS — I25.810 CORONARY ARTERY DISEASE INVOLVING CORONARY BYPASS GRAFT OF NATIVE HEART WITHOUT ANGINA PECTORIS: Primary | ICD-10-CM

## 2020-05-20 DIAGNOSIS — E78.5 HYPERLIPIDEMIA LDL GOAL <70: ICD-10-CM

## 2020-05-20 DIAGNOSIS — I10 BENIGN ESSENTIAL HYPERTENSION: ICD-10-CM

## 2020-05-20 PROCEDURE — 99213 OFFICE O/P EST LOW 20 MIN: CPT | Mod: 95 | Performed by: NURSE PRACTITIONER

## 2020-05-20 RX ORDER — ROSUVASTATIN CALCIUM 40 MG/1
40 TABLET, COATED ORAL DAILY
Qty: 90 TABLET | Refills: 3 | Status: SHIPPED | OUTPATIENT
Start: 2020-05-20 | End: 2020-06-19 | Stop reason: SINTOL

## 2020-05-20 NOTE — PATIENT INSTRUCTIONS
Medication Changes:  STOP atorvastatin   START rosuvastatin 40 mg daily     Recommendations:  1. Call with questions     Follow-up:  1. Fasting lab in 2 months (lipid/ALT)  2. See Dr. Felix for cardiology follow up at [facility]: Oct 2020. Call in July to schedule.     Cardiology Scheduling~[phone]  Cardiology Clinic RNs~[phone] (Aaliyah Álvarez RN and Tiffany Manuel RN)

## 2020-05-20 NOTE — PROGRESS NOTES
"David Richard Schoenecker is a 69 year old male who is being evaluated via a billable telephone visit.      The patient has been notified of following:     \"This telephone visit will be conducted via a call between you and your physician/provider. We have found that certain health care needs can be provided without the need for a physical exam.  This service lets us provide the care you need with a short phone conversation.  If a prescription is necessary we can send it directly to your pharmacy.  If lab work is needed we can place an order for that and you can then stop by our lab to have the test done at a later time.    Telephone visits are billed at different rates depending on your insurance coverage. During this emergency period, for some insurers they may be billed the same as an in-person visit.  Please reach out to your insurance provider with any questions.    If during the course of the call the physician/provider feels a telephone visit is not appropriate, you will not be charged for this service.\"    Patient has given verbal consent for Telephone visit?  Yes    What phone number would you like to be contacted at? [phone]    How would you like to obtain your AVS? Mail a copy  8:17 AM 05/20/20 FABIEN Anne CMA    Phone call duration: 11 minutes    [facility]        Cardiology Clinic Progress Note  David Richard Schoenecker MRN# [id number]   YOB: 1950 Age: 69 year old      Primary Cardiologist:    Dr. Felix          History of Presenting Illness:      David Richard Schoenecker is a pleasant 69 year old patient with a past cardiac history significant for   1. CAD with CABG 11/2018,   2. ischemic cardiomyopathy with chronic heart failure,   3. hypertension,   4. hyperlipidemia,   5. carotid artery disease.     Pt saw Dr. Felix in June 2019. He was participating in cardiac rehabilitation without difficulty.  He had an episode of hypotension with BP " 70/40  associated with fatigue  and lightheadedness.  Echocardiogram December 2018 showed mild increase in LVEF from 25-30 to 35% and continued with inferior, inferolateral, lateral wall hypokinesis. His atorvastatin was increased.  He discontinued Lasix which was felt to be contributing to dehydration.    Patient was last seen by me in September 2019. Echocardiogram 7/1/2019 showed significant improvement in LVEF up to 50-55%, mild inferior and apical wall hypokinesis, and no significant valvular disease.  After previously increasing statin therapy, his lipid profile had worsened.  He had been taking medications correctly and consistently.  His weight was up but believed this was due to caloric intake.  He denied any heart failure symptoms.  Blood pressure was well controlled.  He felt that he had herniation on the lower portion of his sternal incision and I recommended CV surgery follow-up.  Recheck of lipid profile October 2019 continued to show no significant improvement after previously increasing statin therapy.  Total cholesterol 171 HDL 36  and triglycerides 85.  At that time, he was not interested in PCSK9 inhibitor.    Pt presents today for 6 month follow-up.  He has been doing well from a cardiac standpoint.  He denies any anginal symptoms.  He does not have a way to assess blood pressures at home but in the past have been well controlled.  In regards to his lipids, he is agreeable to trying rosuvastatin to see if there is any improvement in LDL.  He does note a history of myalgias which have been tolerable.  I again recommended trying PCSK9 inhibitors but he continues to decline at this time. Patient reports no chest pain, shortness of breath, PND, orthopnea, presyncope, syncope, edema, heart racing, or palpitations.    Current Cardiac Medications   Aspirin 81 mg daily  Atorvastatin 80 mg daily  Lisinopril 2.5 mg daily  Metoprolol XL 25 mg daily                   Assessment and Plan:        Plan  STOP atorvastatin   START rosuvastatin 40 mg daily for better lipid management    Recommendations:  1. Call with questions     Follow-up:  1. Fasting lab in 2 months (lipid/ALT)  2. See Dr. Felix for cardiology follow up at [facility]: Oct 2020 before leaving for [place]         1. CAD    CABG 11/2018×3 (LIMA to LAD, SVG to OM  And PDA)    No angina    Continue statin, aspirin, ACE inhibitor, beta blocker      2. H/o Ischemic cardiomyopathy with systolic heart failure    LVEF 50-55% 7/2019, originally 25-30% improved to 35% 12/2018    No signs of heart failure    Continue ACE inhibitor, beta blocker      3. hyperlipidemia    Last  on 10/2019-atorvastatin 80 mg daily    Start rosuvastatin 40 mg daily    Consider PCSK9 if needed- Pt declining     No zetia d/t cost       4. carotid artery disease    CTa 11/2018 60% stenosis ADAM, 30% LICA    Continue statin, aspirin    Follow with ultrasound       5. hypertension    controlled    Continue lisinopril, metoprolol         Thank you for allowing me to participate in this delightful patient's care.      This note was completed in part using Dragon voice recognition software. Although reviewed after completion, some word and grammatical errors may occur.    Bridgett Aguiar, APRN CNP, APRN, CNP           Data:   All laboratory data reviewed        HPI and Plan:   See dictation    Orders Placed This Encounter   Procedures     Lipid Profile     ALT     Follow-Up with Cardiologist       Orders Placed This Encounter   Medications     rosuvastatin (CRESTOR) 40 MG tablet     Sig: Take 1 tablet (40 mg) by mouth daily     Dispense:  90 tablet     Refill:  3       Medications Discontinued During This Encounter   Medication Reason     atorvastatin (LIPITOR) 80 MG tablet Alternate therapy         Encounter Diagnoses   Name Primary?     Coronary artery disease involving coronary bypass graft of native heart without angina pectoris Yes     Bilateral  carotid artery stenosis      Benign essential hypertension      Hyperlipidemia LDL goal <70        CURRENT MEDICATIONS:  Current Outpatient Medications   Medication Sig Dispense Refill     aspirin (ASA) 81 MG tablet Take 1 tablet (81 mg) by mouth daily       calcium carbonate (TUMS) 500 MG chewable tablet Take 1 chew tab by mouth as needed for heartburn       lisinopril (PRINIVIL/ZESTRIL) 2.5 MG tablet Take 1 tablet (2.5 mg) by mouth daily 90 tablet 3     loratadine (CLARITIN) 10 MG tablet Take 10 mg by mouth daily       metoprolol succinate ER (TOPROL-XL) 25 MG 24 hr tablet Take 1 tablet (25 mg) by mouth daily 90 tablet 1     Naproxen Sodium (ALEVE PO) Take by mouth as needed for moderate pain       rosuvastatin (CRESTOR) 40 MG tablet Take 1 tablet (40 mg) by mouth daily 90 tablet 3       ALLERGIES   No Known Allergies    PAST MEDICAL HISTORY:  Past Medical History:   Diagnosis Date     Benign essential hypertension      Coronary artery disease     11/2018 CABG     Heart disease      Ischemic cardiomyopathy 12/2018    echo 12/2018- EF 35%     Mixed hyperlipidemia        PAST SURGICAL HISTORY:  Past Surgical History:   Procedure Laterality Date     APPENDECTOMY       BYPASS GRAFT ARTERY CORONARY N/A 11/6/2018    Procedure: CORONARY ARTERY BYPASS GRAFTING x 3 (LIMA - LAD; SV - OM; SV - PDA) WITH ENDOSCOPIC VEIN HARVEST ON THE LEFT LOWER EXTREMITY    (ON PUMP OXYGENATOR ; BAL BY KEYA)   ;  Surgeon: Sander Wan MD;  Location:  OR     ENT SURGERY      tonsillectomy       FAMILY HISTORY:  Family History   Problem Relation Age of Onset     Coronary Artery Disease Mother      Cerebrovascular Disease Mother      Myocardial Infarction Mother      Family History Negative Father      Unknown/Adopted Maternal Grandmother      Unknown/Adopted Maternal Grandfather      Unknown/Adopted Paternal Grandmother      Unknown/Adopted Paternal Grandfather      Heart Surgery Brother         bypass     Heart Surgery Brother          bypass     Heart Surgery Brother         bypass       SOCIAL HISTORY:  Social History     Socioeconomic History     Marital status:      Spouse name: None     Number of children: None     Years of education: None     Highest education level: None   Occupational History     None   Social Needs     Financial resource strain: None     Food insecurity     Worry: None     Inability: None     Transportation needs     Medical: None     Non-medical: None   Tobacco Use     Smoking status: Former Smoker     Start date: 1965     Last attempt to quit: 2018     Years since quittin.5     Smokeless tobacco: Never Used     Tobacco comment: 18: last cigarette. 2 ppd. Off/on for 50 years   Substance and Sexual Activity     Alcohol use: No     Drug use: No     Sexual activity: None   Lifestyle     Physical activity     Days per week: None     Minutes per session: None     Stress: None   Relationships     Social connections     Talks on phone: None     Gets together: None     Attends Scientology service: None     Active member of club or organization: None     Attends meetings of clubs or organizations: None     Relationship status: None     Intimate partner violence     Fear of current or ex partner: None     Emotionally abused: None     Physically abused: None     Forced sexual activity: None   Other Topics Concern     Parent/sibling w/ CABG, MI or angioplasty before 65F 55M? Not Asked   Social History Narrative     None       Review of Systems:  Skin:  Negative       Eyes:  Positive for glasses    ENT:  Negative      Respiratory:  Negative       Cardiovascular:    Positive for;edema ribs and chest get a little numb once in awhile  Gastroenterology: Positive for heartburn    Genitourinary:  Negative      Musculoskeletal:  Negative      Neurologic:  Negative      Psychiatric:  Negative      Heme/Lymph/Imm:  Negative      Endocrine:  Negative        Physical Exam:  Vitals: Wt 106.6 kg (235 lb)   BMI  32.78 kg/m      Constitutional:           Skin:             Head:           Eyes:           Lymph:      ENT:           Neck:           Respiratory:            Cardiac:                                                           GI:           Extremities and Muscular Skeletal:                 Neurological:           Psych:           CC  Ramses Felix MD  [address]  [place] [id number]

## 2020-05-20 NOTE — LETTER
5/20/2020    Naseem Ramirez MD  [facility] [id number]  [place] [address]    RE: David Richard Schoenecker       Dear Colleague,    I had the pleasure of seeing David Richard Schoenecker in the [facility].    David Richard Schoenecker is a 69 year old male who is being evaluated via a billable telephone visit.      Cardiology Clinic Progress Note  David Richard Schoenecker MRN# [id number]   YOB: 1950 Age: 69 year old      Primary Cardiologist:    Dr. Felix          History of Presenting Illness:      David Richard Schoenecker is a pleasant 69 year old patient with a past cardiac history significant for   1. CAD with CABG 11/2018,   2. ischemic cardiomyopathy with chronic heart failure,   3. hypertension,   4. hyperlipidemia,   5. carotid artery disease.     Pt saw Dr. Felix in June 2019. He was participating in cardiac rehabilitation without difficulty.  He had an episode of hypotension with BP 70/40  associated with fatigue  and lightheadedness.  Echocardiogram December 2018 showed mild increase in LVEF from 25-30 to 35% and continued with inferior, inferolateral, lateral wall hypokinesis. His atorvastatin was increased.  He discontinued Lasix which was felt to be contributing to dehydration.    Patient was last seen by me in September 2019. Echocardiogram 7/1/2019 showed significant improvement in LVEF up to 50-55%, mild inferior and apical wall hypokinesis, and no significant valvular disease.  After previously increasing statin therapy, his lipid profile had worsened.  He had been taking medications correctly and consistently.  His weight was up but believed this was due to caloric intake.  He denied any heart failure symptoms.  Blood pressure was well controlled.  He felt that he had herniation on the lower portion of his sternal incision and I recommended CV surgery follow-up.  Recheck of lipid profile October 2019 continued to  show no significant improvement after previously increasing statin therapy.  Total cholesterol 171 HDL 36  and triglycerides 85.  At that time, he was not interested in PCSK9 inhibitor.    Pt presents today for 6 month follow-up.  He has been doing well from a cardiac standpoint.  He denies any anginal symptoms.  He does not have a way to assess blood pressures at home but in the past have been well controlled.  In regards to his lipids, he is agreeable to trying rosuvastatin to see if there is any improvement in LDL.  He does note a history of myalgias which have been tolerable.  I again recommended trying PCSK9 inhibitors but he continues to decline at this time. Patient reports no chest pain, shortness of breath, PND, orthopnea, presyncope, syncope, edema, heart racing, or palpitations.    Current Cardiac Medications   Aspirin 81 mg daily  Atorvastatin 80 mg daily  Lisinopril 2.5 mg daily  Metoprolol XL 25 mg daily                   Assessment and Plan:       Plan  STOP atorvastatin   START rosuvastatin 40 mg daily for better lipid management    Recommendations:  1. Call with questions     Follow-up:  1. Fasting lab in 2 months (lipid/ALT)  2. See Dr. Felix for cardiology follow up at [place]: Oct 2020 before leaving for [place]         1. CAD    CABG 11/2018×3 (LIMA to LAD, SVG to OM  And PDA)    No angina    Continue statin, aspirin, ACE inhibitor, beta blocker      2. H/o Ischemic cardiomyopathy with systolic heart failure    LVEF 50-55% 7/2019, originally 25-30% improved to 35% 12/2018    No signs of heart failure    Continue ACE inhibitor, beta blocker      3. hyperlipidemia    Last  on 10/2019-atorvastatin 80 mg daily    Start rosuvastatin 40 mg daily    Consider PCSK9 if needed- Pt declining     No zetia d/t cost       4. carotid artery disease    CTa 11/2018 60% stenosis ADAM, 30% LICA    Continue statin, aspirin    Follow with ultrasound       5. hypertension    controlled    Continue  lisinopril, metoprolol         Thank you for allowing me to participate in this delightful patient's care.      This note was completed in part using Dragon voice recognition software. Although reviewed after completion, some word and grammatical errors may occur.    Bridgett Aguiar, RAVI CNP, APRN, CNP           Data:   All laboratory data reviewed        HPI and Plan:   See dictation    Orders Placed This Encounter   Procedures     Lipid Profile     ALT     Follow-Up with Cardiologist       Orders Placed This Encounter   Medications     rosuvastatin (CRESTOR) 40 MG tablet     Sig: Take 1 tablet (40 mg) by mouth daily     Dispense:  90 tablet     Refill:  3       Medications Discontinued During This Encounter   Medication Reason     atorvastatin (LIPITOR) 80 MG tablet Alternate therapy         Encounter Diagnoses   Name Primary?     Coronary artery disease involving coronary bypass graft of native heart without angina pectoris Yes     Bilateral carotid artery stenosis      Benign essential hypertension      Hyperlipidemia LDL goal <70        CURRENT MEDICATIONS:  Current Outpatient Medications   Medication Sig Dispense Refill     aspirin (ASA) 81 MG tablet Take 1 tablet (81 mg) by mouth daily       calcium carbonate (TUMS) 500 MG chewable tablet Take 1 chew tab by mouth as needed for heartburn       lisinopril (PRINIVIL/ZESTRIL) 2.5 MG tablet Take 1 tablet (2.5 mg) by mouth daily 90 tablet 3     loratadine (CLARITIN) 10 MG tablet Take 10 mg by mouth daily       metoprolol succinate ER (TOPROL-XL) 25 MG 24 hr tablet Take 1 tablet (25 mg) by mouth daily 90 tablet 1     Naproxen Sodium (ALEVE PO) Take by mouth as needed for moderate pain       rosuvastatin (CRESTOR) 40 MG tablet Take 1 tablet (40 mg) by mouth daily 90 tablet 3       ALLERGIES   No Known Allergies    PAST MEDICAL HISTORY:  Past Medical History:   Diagnosis Date     Benign essential hypertension      Coronary artery disease     11/2018  CABG     Heart disease      Ischemic cardiomyopathy 2018    echo 2018- EF 35%     Mixed hyperlipidemia        PAST SURGICAL HISTORY:  Past Surgical History:   Procedure Laterality Date     APPENDECTOMY       BYPASS GRAFT ARTERY CORONARY N/A 2018    Procedure: CORONARY ARTERY BYPASS GRAFTING x 3 (LIMA - LAD; SV - OM; SV - PDA) WITH ENDOSCOPIC VEIN HARVEST ON THE LEFT LOWER EXTREMITY    (ON PUMP OXYGENATOR ; BAL BY KEYA)   ;  Surgeon: Sander Wan MD;  Location:  OR     ENT SURGERY      tonsillectomy       FAMILY HISTORY:  Family History   Problem Relation Age of Onset     Coronary Artery Disease Mother      Cerebrovascular Disease Mother      Myocardial Infarction Mother      Family History Negative Father      Unknown/Adopted Maternal Grandmother      Unknown/Adopted Maternal Grandfather      Unknown/Adopted Paternal Grandmother      Unknown/Adopted Paternal Grandfather      Heart Surgery Brother         bypass     Heart Surgery Brother         bypass     Heart Surgery Brother         bypass       SOCIAL HISTORY:  Social History     Socioeconomic History     Marital status:      Spouse name: None     Number of children: None     Years of education: None     Highest education level: None   Occupational History     None   Social Needs     Financial resource strain: None     Food insecurity     Worry: None     Inability: None     Transportation needs     Medical: None     Non-medical: None   Tobacco Use     Smoking status: Former Smoker     Start date: 1965     Last attempt to quit: 2018     Years since quittin.5     Smokeless tobacco: Never Used     Tobacco comment: 18: last cigarette. 1/2 ppd. Off/on for 50 years   Substance and Sexual Activity     Alcohol use: No     Drug use: No     Sexual activity: None   Lifestyle     Physical activity     Days per week: None     Minutes per session: None     Stress: None   Relationships     Social connections     Talks on phone:  None     Gets together: None     Attends Zoroastrianism service: None     Active member of club or organization: None     Attends meetings of clubs or organizations: None     Relationship status: None     Intimate partner violence     Fear of current or ex partner: None     Emotionally abused: None     Physically abused: None     Forced sexual activity: None   Other Topics Concern     Parent/sibling w/ CABG, MI or angioplasty before 65F 55M? Not Asked   Social History Narrative     None       Review of Systems:  Skin:  Negative       Eyes:  Positive for glasses    ENT:  Negative      Respiratory:  Negative       Cardiovascular:    Positive for;edema ribs and chest get a little numb once in awhile  Gastroenterology: Positive for heartburn    Genitourinary:  Negative      Musculoskeletal:  Negative      Neurologic:  Negative      Psychiatric:  Negative      Heme/Lymph/Imm:  Negative      Endocrine:  Negative        Physical Exam:  Vitals: Wt 106.6 kg (235 lb)   BMI 32.78 kg/m      Constitutional:           Skin:             Head:           Eyes:           Lymph:      ENT:           Neck:           Respiratory:            Cardiac:                                                           GI:           Extremities and Muscular Skeletal:                 Neurological:           Psych:             Thank you for allowing me to participate in the care of your patient.    Sincerely,     [facility]

## 2020-06-11 NOTE — PROVIDER NOTIFICATION
- Chart reviewed. Recent events noted.  - Spoke to Dr. Neal and discussed briefly with primary team  - Patient's condition is declining. No meaningful recovery expected.     # Respiratory failure  - Remains vent-dependent  - Unfortunately due to thrombocytopenia, cannot proceed with trach    # Renal failure  - HD dependent  - Pt has not been tolerating treatments  - Family wanted "one more trial" of HD today to see how patient does  - Will see how he does during today's treatment and ff up w/ family    # Anemia, thrombocytopenia  - Likely myelosuppression + blood loss  - Transfusions are medically futile in my opinion    # Wounds  - I only expect wound to worsen Dr. Cintron updated with critical lactic acid of 4.4. Reviewed pt drip infusions/changes and recent 500 ml bolus of albumin. Will hold on extubation and recheck lactic acid.

## 2020-06-19 ENCOUNTER — TELEPHONE (OUTPATIENT)
Dept: CARDIOLOGY | Facility: CLINIC | Age: 70
End: 2020-06-19

## 2020-06-19 DIAGNOSIS — E78.5 HYPERLIPIDEMIA LDL GOAL <70: Primary | ICD-10-CM

## 2020-06-19 RX ORDER — ATORVASTATIN CALCIUM 80 MG/1
80 TABLET, FILM COATED ORAL DAILY
COMMUNITY
Start: 2020-06-19 | End: 2020-07-01 | Stop reason: ALTCHOICE

## 2020-06-19 NOTE — TELEPHONE ENCOUNTER
PT called stating that since he was switched to rosuvastatin he is having extreme aching in all joints, really intolerable.  Pt states he cannot take medication any longer.    5/20/20 changed atorvastatin to rosuvastatin        Last  on 10/2019-atorvastatin 80 mg daily    Start rosuvastatin 40 mg daily    Consider PCSK9 if needed- Pt declining     No zetia d/t cost       Will route to Bridgett Mehta for recommendations.  CARLOS ESPINOSA RN on 6/19/2020 at 8:41 AM      ADDENDUM:   If he is still not agreeable to PCSK9 inhibitor, I would like him to switch back to atorvastatin 80 mg. He was switched to have better lipid control. He had mild myalgias on atorvastatin but stated they were tolerable. Ultimately, pcsk9 inhibitor would be better so that his LDL would be at goal.   RAVI Blood CNP    ADDENDUM:  Pt would prefer to go back on atorvastatin 80mg.  He will follow up with Dr Felix in OCT as planned.  CARLOS ESPINOSA RN on 6/19/2020 at 12:00 PM    ADDENDUM: Pt called in to report that he is having significant myalgias on the atorvastatin. Advised to stop. States he is willing to try PCSK9 Inhibitor if his insurance would cover it. Briefly discussed the prior authorization process. Will discuss with Bridgett Mehta NP for orders. Tiffany Manuel RN Cardiology July 1, 2020, 10:32 AM      ADDENDUM:   That is fine. I will send to [employer] to see what PCSK9 inhibitor would have better coverage for him and start the PA process.   Thanks,  RAVI Blood CNP

## 2020-07-01 NOTE — TELEPHONE ENCOUNTER
Per SNorgaard, CNP's request, entered order for Repatha and routed to specialty pharmacy liaison to initiate PA with pt's insurance.     DALILA Doe 11:30 AM 7/1/2020

## 2020-07-01 NOTE — TELEPHONE ENCOUNTER
PA Initiation    Medication: Repatha 140MG/ML Sure[employer] (PENDING)  Insurance Company: [employer] - Phone [phone] Fax [phone]  Pharmacy Filling the Rx: [facility] - [place], [place] - [address]  Filling Pharmacy Phone: [phone]  Filling Pharmacy Fax: [phone]  Start Date: 7/1/2020

## 2020-07-02 NOTE — TELEPHONE ENCOUNTER
Called and spoke with pt.  He reports he is currently out golfing and we discussed that plan will be to talk on 7/6/20 regarding PCSK9 Inhibitor.  Pt reports he did not take atorvastatin last night and he already feels much better.  Advised pt continue to not take atorvastatin- he agrees with this plan.    DALILA Doe 4:25 PM 7/2/2020

## 2020-07-06 DIAGNOSIS — Z95.1 S/P CABG (CORONARY ARTERY BYPASS GRAFT): ICD-10-CM

## 2020-07-06 RX ORDER — METOPROLOL SUCCINATE 25 MG/1
25 TABLET, EXTENDED RELEASE ORAL DAILY
Qty: 90 TABLET | Refills: 3 | Status: SHIPPED | OUTPATIENT
Start: 2020-07-06 | End: 2020-09-21

## 2020-07-07 NOTE — TELEPHONE ENCOUNTER
Received VM from pt who reports annual household income in 2018 was $53,000.00.    Routed to pharmacy liaison to request pt's information be submitted to Jose Doe RN 2:33 PM 7/7/2020

## 2020-07-07 NOTE — TELEPHONE ENCOUNTER
Called and spoke with pt.  Discussed Repatha PA approved; however, first month copay is high due to $400 deductible and copay of $43.20 per month thereafter.  Also discussed [employer] german- pt would like to see if he qualifies for that and will callback with his annual income for his household of 2.    DALILA Doe 1:25 PM 7/7/2020

## 2020-07-08 NOTE — TELEPHONE ENCOUNTER
Enrolled patient in a german through the [employer]: He has been awarded $2500 to help with his out of pocket costs for Repatha. Provided german processing information to [facility].

## 2020-07-08 NOTE — TELEPHONE ENCOUNTER
Called and spoke with pt.  Discussed that he has been approved for the [employer], which is $2,500.00 for the year to help cover the out of pocket expenses for Reptha.  Pt agrees to have Repatha delivered from [facility] and will callback to discuss the medication and review injection technique prior to his first injection. Advised pt to review [employer] website and view training video and then call once he is ready to do first injection- he verbalized understanding and agrees with this plan.    DALILA Doe 3:42 PM 7/8/2020

## 2020-08-13 NOTE — TELEPHONE ENCOUNTER
Called and spoke with pt.  He confirms he has started Repatha, has taken two doses so far and it has been going well. He denies any questions/concerns with the injection itself or the medication. Reviewed with pt that he is due for follow up with Dr. Felix ~ 10/1/20 and will plan to check cholesterol at that time as well. Encouraged pt to call the [facility] clinic to get appt for visit and lab scheduled. He verbalized understanding and agrees with this plan.    DALILA Doe 2:38 PM 8/13/2020

## 2020-09-14 DIAGNOSIS — E78.5 HYPERLIPIDEMIA LDL GOAL <70: ICD-10-CM

## 2020-09-14 LAB
ALT SERPL W P-5'-P-CCNC: 16 U/L (ref 0–70)
CHOLEST SERPL-MCNC: 163 MG/DL
HDLC SERPL-MCNC: 37 MG/DL
LDLC SERPL CALC-MCNC: 105 MG/DL
NONHDLC SERPL-MCNC: 126 MG/DL
TRIGL SERPL-MCNC: 105 MG/DL

## 2020-09-14 PROCEDURE — 80061 LIPID PANEL: CPT | Performed by: NURSE PRACTITIONER

## 2020-09-14 PROCEDURE — 84460 ALANINE AMINO (ALT) (SGPT): CPT | Performed by: NURSE PRACTITIONER

## 2020-09-14 PROCEDURE — 36415 COLL VENOUS BLD VENIPUNCTURE: CPT | Performed by: NURSE PRACTITIONER

## 2020-09-21 ENCOUNTER — VIRTUAL VISIT (OUTPATIENT)
Dept: CARDIOLOGY | Facility: CLINIC | Age: 70
End: 2020-09-21
Attending: NURSE PRACTITIONER
Payer: COMMERCIAL

## 2020-09-21 VITALS — BODY MASS INDEX: 33.47 KG/M2 | WEIGHT: 240 LBS

## 2020-09-21 DIAGNOSIS — I65.23 BILATERAL CAROTID ARTERY STENOSIS: ICD-10-CM

## 2020-09-21 DIAGNOSIS — I25.810 CORONARY ARTERY DISEASE INVOLVING CORONARY BYPASS GRAFT OF NATIVE HEART WITHOUT ANGINA PECTORIS: ICD-10-CM

## 2020-09-21 DIAGNOSIS — E78.5 HYPERLIPIDEMIA LDL GOAL <70: ICD-10-CM

## 2020-09-21 DIAGNOSIS — I10 BENIGN ESSENTIAL HYPERTENSION: ICD-10-CM

## 2020-09-21 DIAGNOSIS — Z95.1 S/P CABG (CORONARY ARTERY BYPASS GRAFT): ICD-10-CM

## 2020-09-21 PROCEDURE — 99213 OFFICE O/P EST LOW 20 MIN: CPT | Mod: 95 | Performed by: INTERNAL MEDICINE

## 2020-09-21 RX ORDER — METOPROLOL SUCCINATE 25 MG/1
12.5 TABLET, EXTENDED RELEASE ORAL DAILY
Qty: 90 TABLET | Refills: 3
Start: 2020-09-21 | End: 2021-06-29

## 2020-09-21 NOTE — PROGRESS NOTES
"David Richard Schoenecker is a 70 year old male who is being evaluated via a billable video visit.      The patient has been notified of following:     \"This video visit will be conducted via a call between you and your physician/provider. We have found that certain health care needs can be provided without the need for an in-person physical exam.  This service lets us provide the care you need with a video conversation.  If a prescription is necessary we can send it directly to your pharmacy.  If lab work is needed we can place an order for that and you can then stop by our lab to have the test done at a later time.    Video visits are billed at different rates depending on your insurance coverage.  Please reach out to your insurance provider with any questions.    If during the course of the call the physician/provider feels a video visit is not appropriate, you will not be charged for this service.\"    Patient has given verbal consent for Video visit? Yes  How would you like to obtain your AVS? Mail a copy  If you are dropped from the video visit, the video invite should be resent to: Text to cell phone: [phone]  Will anyone else be joining your video visit? No        Video-Visit Details    Type of service:  Video Visit    Video Start Time: 11:30 AM  Video End Time: 11:47 AM    Originating Location (pt. Location): Home    Distant Location (provider location):  [facility]     Platform used for Video Visit: [employer]    This clinic visit was performed via telephone/video due to COVID-19 restrictions.    Today, I had the pleasure of connecting with David Richard Schoenecker for a follow-up visit.  He is a pleasant 70 year old patient with a past medical history of CAD S/P CABG with LIMA to LAD, SVG to OM and PDA, ischemic cardiomyopathy with ejection fraction of 35%, hypertension, hyperlipidemia and mild to moderate bilateral carotid stenosis whom I saw for a follow-up visit. "      The patient has been doing well since his prior clinic visit.  He  underwent cardiac rehabitation and has been tolerating it well.  He does describe an episode of hypotension when he was trying to have his daughter move.  His daughter took his blood pressure because he was fatigued and he was found to have blood pressure of 70/40.  He did feel tired and slightly dizzy at that time.  He continues to take 20 mg of Lasix since hospital discharge.  During the clinic his blood pressure is 128/76 which he thinks is unusually high.     He otherwise is doing well and does not report any chest pain or dyspnea on exertion.  His last echocardiogram was on 12/11/2018 prior to visit with Tiffani and showed mild increase in ejection fraction from 25-30 to 35%.  There was inferior, inferolateral and lateral wall hypokinesis.     Assessment and plan:    1.  CAD status post CABG x3  2.  Moderate carotid artery disease  3.  Hyperlipidemia- on PCSK9, intolerant to statin  4.  Ischemic cardiomyopathy -recovered ejection fraction, EF 50%  5.  Hypotension     Discussion  The patient is stable from coronary disease standpoint and denies chest pain, shortness of breath or exertional dyspnea.  He will stay on aspirin indefinitely.  For moderate carotid artery disease we will continue to aggressively modify risk factors.  For hyperlipidemia, he has been intolerant to statins and did not tolerate switching from Lipitor to Crestor.  He was started on Repatha which he has been tolerating well so far.  Last lipid profile shows LDL of 110 mg/dL.  The ischemic cardiomyopathy has resolved and most ejection fraction was 50-55%.  I would like to uptitrate his cardiac medications but I am limited by complaints of hypotension and dizziness.  Hence, today I will decrease the dose of metoprolol to 12.5 mg.  He continues to stay euvolemic of Lasix    Plan  1.  Continue all current medications.  2.  Heart healthy diet and exercise regularly.  3.   Follow-up with me in 12 months with repeat fasting lipid profile    This note was completed in part using Dragon voice recognition software. Although reviewed after completion, some word and grammatical errors may occur.    Ramses Felix MD  Cardiology    Orders Placed This Encounter   Procedures     Lipid Profile       Orders Placed This Encounter   Medications     metoprolol succinate ER (TOPROL-XL) 25 MG 24 hr tablet     Sig: Take 0.5 tablets (12.5 mg) by mouth daily     Dispense:  90 tablet     Refill:  3       Medications Discontinued During This Encounter   Medication Reason     metoprolol succinate ER (TOPROL-XL) 25 MG 24 hr tablet        Encounter Diagnoses   Name Primary?     Coronary artery disease involving coronary bypass graft of native heart without angina pectoris      Bilateral carotid artery stenosis      Benign essential hypertension      Hyperlipidemia LDL goal <70      S/P CABG (coronary artery bypass graft)        CURRENT MEDICATIONS:  Current Outpatient Medications   Medication Sig Dispense Refill     aspirin (ASA) 81 MG tablet Take 1 tablet (81 mg) by mouth daily       calcium carbonate (TUMS) 500 MG chewable tablet Take 1 chew tab by mouth as needed for heartburn       evolocumab (REPATHA) 140 MG/ML prefilled autoinjector Inject 1 mL (140 mg) Subcutaneous every 14 days 6 mL 3     lisinopril (PRINIVIL/ZESTRIL) 2.5 MG tablet Take 1 tablet (2.5 mg) by mouth daily 90 tablet 3     loratadine (CLARITIN) 10 MG tablet Take 10 mg by mouth daily       metoprolol succinate ER (TOPROL-XL) 25 MG 24 hr tablet Take 0.5 tablets (12.5 mg) by mouth daily 90 tablet 3     Naproxen Sodium (ALEVE PO) Take by mouth as needed for moderate pain         ALLERGIES   No Known Allergies    PAST MEDICAL HISTORY:  Past Medical History:   Diagnosis Date     Benign essential hypertension      Coronary artery disease     11/2018 CABG     Heart disease      Ischemic cardiomyopathy 12/2018    echo 12/2018- EF 35%     Mixed  hyperlipidemia        PAST SURGICAL HISTORY:  Past Surgical History:   Procedure Laterality Date     APPENDECTOMY       BYPASS GRAFT ARTERY CORONARY N/A 2018    Procedure: CORONARY ARTERY BYPASS GRAFTING x 3 (LIMA - LAD; SV - OM; SV - PDA) WITH ENDOSCOPIC VEIN HARVEST ON THE LEFT LOWER EXTREMITY    (ON PUMP OXYGENATOR ; BAL BY KEYA)   ;  Surgeon: Sander Wan MD;  Location:  OR     ENT SURGERY      tonsillectomy       FAMILY HISTORY:  Family History   Problem Relation Age of Onset     Coronary Artery Disease Mother      Cerebrovascular Disease Mother      Myocardial Infarction Mother      Family History Negative Father      Unknown/Adopted Maternal Grandmother      Unknown/Adopted Maternal Grandfather      Unknown/Adopted Paternal Grandmother      Unknown/Adopted Paternal Grandfather      Heart Surgery Brother         bypass     Heart Surgery Brother         bypass     Heart Surgery Brother         bypass       SOCIAL HISTORY:  Social History     Socioeconomic History     Marital status:      Spouse name: None     Number of children: None     Years of education: None     Highest education level: None   Occupational History     None   Social Needs     Financial resource strain: None     Food insecurity     Worry: None     Inability: None     Transportation needs     Medical: None     Non-medical: None   Tobacco Use     Smoking status: Former Smoker     Start date: 1965     Last attempt to quit: 2018     Years since quittin.8     Smokeless tobacco: Never Used     Tobacco comment: 18: last cigarette. 1/2 ppd. Off/on for 50 years   Substance and Sexual Activity     Alcohol use: No     Drug use: No     Sexual activity: None   Lifestyle     Physical activity     Days per week: None     Minutes per session: None     Stress: None   Relationships     Social connections     Talks on phone: None     Gets together: None     Attends Roman [employer] service: None     Active member of club or  organization: None     Attends meetings of clubs or organizations: None     Relationship status: None     Intimate partner violence     Fear of current or ex partner: None     Emotionally abused: None     Physically abused: None     Forced sexual activity: None   Other Topics Concern     Parent/sibling w/ CABG, MI or angioplasty before 65F 55M? Not Asked   Social History Narrative     None       General Appearance: No distress, normal body habitus, upright.  ENT/Mouth:  Normal head shape, no evidence of injury or laceration.  EYES: No scleral icterus, normal conjunctivae  Neck:  No evidence of thyromegaly  Chest/Lungs: No audible wheezing. Non labored breathing. No cough.  Cardiovascular: No evidence of elevated jugular venous pressure.   Abdomen:  No observed jaundice.  Extremities: No cyanosis.  Skin: No xanthelasma, normal skin collar. No evidence of facial lacerations.  Neurologic: No focal neurological defect. Normal speech.  Psychiatric: Alert and oriented x3    Ramses Felix MD

## 2020-09-21 NOTE — LETTER
"9/21/2020    Naseem Ramirez MD  [facility] [address]  [place]  [place] [id number]    RE: David Richard Schoenecker       Dear Colleague,    I had the pleasure of seeing David Richard Schoenecker in the [facility].    David Richard Schoenecker is a 70 year old male who is being evaluated via a billable video visit.      The patient has been notified of following:     \"This video visit will be conducted via a call between you and your physician/provider. We have found that certain health care needs can be provided without the need for an in-person physical exam.  This service lets us provide the care you need with a video conversation.  If a prescription is necessary we can send it directly to your pharmacy.  If lab work is needed we can place an order for that and you can then stop by our lab to have the test done at a later time.    Video visits are billed at different rates depending on your insurance coverage.  Please reach out to your insurance provider with any questions.    If during the course of the call the physician/provider feels a video visit is not appropriate, you will not be charged for this service.\"    Patient has given verbal consent for Video visit? Yes  How would you like to obtain your AVS? Mail a copy  If you are dropped from the video visit, the video invite should be resent to: Text to cell phone: [phone]  Will anyone else be joining your video visit? No        Video-Visit Details    Type of service:  Video Visit    Video Start Time: 11:30 AM  Video End Time: 11:47 AM    Originating Location (pt. Location): Home    Distant Location (provider location):  [facility]     Platform used for Video Visit: [employer]    This clinic visit was performed via telephone/video due to COVID-19 restrictions.    Today, I had the pleasure of connecting with David Richard Schoenecker for a follow-up visit.  He is a " pleasant 70 year old patient with a past medical history of CAD S/P CABG with LIMA to LAD, SVG to OM and PDA, ischemic cardiomyopathy with ejection fraction of 35%, hypertension, hyperlipidemia and mild to moderate bilateral carotid stenosis whom I saw for a follow-up visit.      The patient has been doing well since his prior clinic visit.  He  underwent cardiac rehabitation and has been tolerating it well.  He does describe an episode of hypotension when he was trying to have his daughter move.  His daughter took his blood pressure because he was fatigued and he was found to have blood pressure of 70/40.  He did feel tired and slightly dizzy at that time.  He continues to take 20 mg of Lasix since hospital discharge.  During the clinic his blood pressure is 128/76 which he thinks is unusually high.     He otherwise is doing well and does not report any chest pain or dyspnea on exertion.  His last echocardiogram was on 12/11/2018 prior to visit with Tiffani and showed mild increase in ejection fraction from 25-30 to 35%.  There was inferior, inferolateral and lateral wall hypokinesis.     Assessment and plan:    1.  CAD status post CABG x3  2.  Moderate carotid artery disease  3.  Hyperlipidemia- on PCSK9, intolerant to statin  4.  Ischemic cardiomyopathy -recovered ejection fraction, EF 50%  5.  Hypotension     Discussion  The patient is stable from coronary disease standpoint and denies chest pain, shortness of breath or exertional dyspnea.  He will stay on aspirin indefinitely.  For moderate carotid artery disease we will continue to aggressively modify risk factors.  For hyperlipidemia, he has been intolerant to statins and did not tolerate switching from Lipitor to Crestor.  He was started on Repatha which he has been tolerating well so far.  Last lipid profile shows LDL of 110 mg/dL.  The ischemic cardiomyopathy has resolved and most ejection fraction was 50-55%.  I would like to uptitrate his cardiac  medications but I am limited by complaints of hypotension and dizziness.  Hence, today I will decrease the dose of metoprolol to 12.5 mg.  He continues to stay euvolemic of Lasix    Plan  1.  Continue all current medications.  2.  Heart healthy diet and exercise regularly.  3.  Follow-up with me in 12 months with repeat fasting lipid profile    This note was completed in part using Dragon voice recognition software. Although reviewed after completion, some word and grammatical errors may occur.    Ramses Felix MD  Cardiology    Orders Placed This Encounter   Procedures     Lipid Profile       Orders Placed This Encounter   Medications     metoprolol succinate ER (TOPROL-XL) 25 MG 24 hr tablet     Sig: Take 0.5 tablets (12.5 mg) by mouth daily     Dispense:  90 tablet     Refill:  3       Medications Discontinued During This Encounter   Medication Reason     metoprolol succinate ER (TOPROL-XL) 25 MG 24 hr tablet        Encounter Diagnoses   Name Primary?     Coronary artery disease involving coronary bypass graft of native heart without angina pectoris      Bilateral carotid artery stenosis      Benign essential hypertension      Hyperlipidemia LDL goal <70      S/P CABG (coronary artery bypass graft)        CURRENT MEDICATIONS:  Current Outpatient Medications   Medication Sig Dispense Refill     aspirin (ASA) 81 MG tablet Take 1 tablet (81 mg) by mouth daily       calcium carbonate (TUMS) 500 MG chewable tablet Take 1 chew tab by mouth as needed for heartburn       evolocumab (REPATHA) 140 MG/ML prefilled autoinjector Inject 1 mL (140 mg) Subcutaneous every 14 days 6 mL 3     lisinopril (PRINIVIL/ZESTRIL) 2.5 MG tablet Take 1 tablet (2.5 mg) by mouth daily 90 tablet 3     loratadine (CLARITIN) 10 MG tablet Take 10 mg by mouth daily       metoprolol succinate ER (TOPROL-XL) 25 MG 24 hr tablet Take 0.5 tablets (12.5 mg) by mouth daily 90 tablet 3     Naproxen Sodium (ALEVE PO) Take by mouth as needed for moderate  pain         ALLERGIES   No Known Allergies    PAST MEDICAL HISTORY:  Past Medical History:   Diagnosis Date     Benign essential hypertension      Coronary artery disease     2018 CABG     Heart disease      Ischemic cardiomyopathy 2018    echo 2018- EF 35%     Mixed hyperlipidemia        PAST SURGICAL HISTORY:  Past Surgical History:   Procedure Laterality Date     APPENDECTOMY       BYPASS GRAFT ARTERY CORONARY N/A 2018    Procedure: CORONARY ARTERY BYPASS GRAFTING x 3 (LIMA - LAD; SV - OM; SV - PDA) WITH ENDOSCOPIC VEIN HARVEST ON THE LEFT LOWER EXTREMITY    (ON PUMP OXYGENATOR ; BAL BY KEYA)   ;  Surgeon: Sander Wan MD;  Location:  OR     ENT SURGERY      tonsillectomy       FAMILY HISTORY:  Family History   Problem Relation Age of Onset     Coronary Artery Disease Mother      Cerebrovascular Disease Mother      Myocardial Infarction Mother      Family History Negative Father      Unknown/Adopted Maternal Grandmother      Unknown/Adopted Maternal Grandfather      Unknown/Adopted Paternal Grandmother      Unknown/Adopted Paternal Grandfather      Heart Surgery Brother         bypass     Heart Surgery Brother         bypass     Heart Surgery Brother         bypass       SOCIAL HISTORY:  Social History     Socioeconomic History     Marital status:      Spouse name: None     Number of children: None     Years of education: None     Highest education level: None   Occupational History     None   Social Needs     Financial resource strain: None     Food insecurity     Worry: None     Inability: None     Transportation needs     Medical: None     Non-medical: None   Tobacco Use     Smoking status: Former Smoker     Start date: 1965     Last attempt to quit: 2018     Years since quittin.8     Smokeless tobacco: Never Used     Tobacco comment: 18: last cigarette. 1/2 ppd. Off/on for 50 years   Substance and Sexual Activity     Alcohol use: No     Drug use: No      Sexual activity: None   Lifestyle     Physical activity     Days per week: None     Minutes per session: None     Stress: None   Relationships     Social connections     Talks on phone: None     Gets together: None     Attends Pentecostalism service: None     Active member of club or organization: None     Attends meetings of clubs or organizations: None     Relationship status: None     Intimate partner violence     Fear of current or ex partner: None     Emotionally abused: None     Physically abused: None     Forced sexual activity: None   Other Topics Concern     Parent/sibling w/ CABG, MI or angioplasty before 65F 55M? Not Asked   Social History Narrative     None       General Appearance: No distress, normal body habitus, upright.  ENT/Mouth:  Normal head shape, no evidence of injury or laceration.  EYES: No scleral icterus, normal conjunctivae  Neck:  No evidence of thyromegaly  Chest/Lungs: No audible wheezing. Non labored breathing. No cough.  Cardiovascular: No evidence of elevated jugular venous pressure.   Abdomen:  No observed jaundice.  Extremities: No cyanosis.  Skin: No xanthelasma, normal skin collar. No evidence of facial lacerations.  Neurologic: No focal neurological defect. Normal speech.  Psychiatric: Alert and oriented x3        Thank you for allowing me to participate in the care of your patient.    Sincerely,     Ramses Felix MD     [facility]

## 2020-11-11 ENCOUNTER — TELEPHONE (OUTPATIENT)
Dept: SCHEDULING | Facility: CLINIC | Age: 70
End: 2020-11-11

## 2020-12-16 DIAGNOSIS — Z95.1 S/P CABG (CORONARY ARTERY BYPASS GRAFT): ICD-10-CM

## 2020-12-16 RX ORDER — LISINOPRIL 2.5 MG/1
2.5 TABLET ORAL DAILY
Qty: 90 TABLET | Refills: 3 | Status: SHIPPED | OUTPATIENT
Start: 2020-12-16 | End: 2021-12-31

## 2021-06-02 DIAGNOSIS — E78.5 HYPERLIPIDEMIA LDL GOAL <70: ICD-10-CM

## 2021-06-29 DIAGNOSIS — Z95.1 S/P CABG (CORONARY ARTERY BYPASS GRAFT): ICD-10-CM

## 2021-06-29 RX ORDER — METOPROLOL SUCCINATE 25 MG/1
12.5 TABLET, EXTENDED RELEASE ORAL DAILY
Qty: 90 TABLET | Refills: 1 | Status: SHIPPED | OUTPATIENT
Start: 2021-06-29 | End: 2022-08-03

## 2021-12-31 DIAGNOSIS — I10 BENIGN ESSENTIAL HYPERTENSION: ICD-10-CM

## 2021-12-31 DIAGNOSIS — I25.810 CORONARY ARTERY DISEASE INVOLVING CORONARY BYPASS GRAFT OF NATIVE HEART WITHOUT ANGINA PECTORIS: Primary | ICD-10-CM

## 2021-12-31 DIAGNOSIS — Z95.1 S/P CABG (CORONARY ARTERY BYPASS GRAFT): ICD-10-CM

## 2021-12-31 RX ORDER — LISINOPRIL 2.5 MG/1
2.5 TABLET ORAL DAILY
Qty: 90 TABLET | Refills: 3 | Status: SHIPPED | OUTPATIENT
Start: 2021-12-31 | End: 2022-08-03

## 2021-12-31 NOTE — TELEPHONE ENCOUNTER
Received incoming message that patient needs to have refill on Lisinopril.  The [facility] in [place] is requesting new Rx because the most recent one expires today.  Reviewed Dr. Felix's note from last progress note which did not make any changes to this medication.  Refill request confirmed for Lisinopril 2.5mg daily.

## 2022-05-19 NOTE — PROVIDER NOTIFICATION
MD Notification    Notified Person: On-call cardiologist    Notified Person Name: Dr. Palacios    Notification Date/Time: 11/5 2030    Notification Interaction: Talked with MD on telephone.    Purpose of Notification: Pt remains hypertensive after dose of oral lisinopril at 1830.    Orders Received: None. Will continue to monitor closely.       No

## 2022-06-02 ENCOUNTER — TELEPHONE (OUTPATIENT)
Dept: CARDIOLOGY | Facility: CLINIC | Age: 72
End: 2022-06-02
Payer: COMMERCIAL

## 2022-06-02 DIAGNOSIS — E78.5 HYPERLIPIDEMIA LDL GOAL <70: ICD-10-CM

## 2022-06-02 NOTE — TELEPHONE ENCOUNTER
LF: 5/3/22  #6  Sig: inject the contents of one pen under the skin every other week    ADDENDUM: [facility] Cardiology Refill Guideline reviewed.  Medication does not meet criteria for refill due to overdue for labs and follow up.  Messaged to providers team for further review. 90 day refill sent, orders extended and scheduling messaged to call pt to schedule labs and follow up. Tiffany Manuel RN Cardiology June 2, 2022, 9:21 AM

## 2022-06-02 NOTE — TELEPHONE ENCOUNTER
Mr. Schoenecker left a message asking for a call regarding a medication question he has.      ADDENDUM: lm for pt to call back to discuss. Tiffany Manuel RN Cardiology June 2, 2022, 2:25 PM    ADDENDUM: Called pt back to discuss. Pt stated he had no further questions. He has an appt to see Dr Felix on 8/3/22 with FLP prior. Continues on Repatha. Tiffany Manuel RN Cardiology Germania 10, 2022, 11:03 AM

## 2022-08-03 ENCOUNTER — LAB (OUTPATIENT)
Dept: LAB | Facility: CLINIC | Age: 72
End: 2022-08-03

## 2022-08-03 ENCOUNTER — OFFICE VISIT (OUTPATIENT)
Dept: CARDIOLOGY | Facility: CLINIC | Age: 72
End: 2022-08-03
Attending: INTERNAL MEDICINE
Payer: COMMERCIAL

## 2022-08-03 VITALS
HEART RATE: 61 BPM | TEMPERATURE: 96.9 F | DIASTOLIC BLOOD PRESSURE: 72 MMHG | WEIGHT: 243.2 LBS | SYSTOLIC BLOOD PRESSURE: 128 MMHG | OXYGEN SATURATION: 94 % | BODY MASS INDEX: 33.92 KG/M2

## 2022-08-03 DIAGNOSIS — I25.810 CORONARY ARTERY DISEASE INVOLVING CORONARY BYPASS GRAFT OF NATIVE HEART WITHOUT ANGINA PECTORIS: ICD-10-CM

## 2022-08-03 DIAGNOSIS — Z95.1 S/P CABG (CORONARY ARTERY BYPASS GRAFT): ICD-10-CM

## 2022-08-03 DIAGNOSIS — I65.23 BILATERAL CAROTID ARTERY STENOSIS: ICD-10-CM

## 2022-08-03 DIAGNOSIS — I10 BENIGN ESSENTIAL HYPERTENSION: ICD-10-CM

## 2022-08-03 DIAGNOSIS — E78.5 HYPERLIPIDEMIA LDL GOAL <70: ICD-10-CM

## 2022-08-03 LAB
CHOLEST SERPL-MCNC: 181 MG/DL
FASTING STATUS PATIENT QL REPORTED: ABNORMAL
HDLC SERPL-MCNC: 35 MG/DL
LDLC SERPL CALC-MCNC: 120 MG/DL
NONHDLC SERPL-MCNC: 146 MG/DL
TRIGL SERPL-MCNC: 128 MG/DL

## 2022-08-03 PROCEDURE — 80061 LIPID PANEL: CPT | Performed by: INTERNAL MEDICINE

## 2022-08-03 PROCEDURE — 36415 COLL VENOUS BLD VENIPUNCTURE: CPT | Performed by: INTERNAL MEDICINE

## 2022-08-03 PROCEDURE — 99214 OFFICE O/P EST MOD 30 MIN: CPT | Performed by: INTERNAL MEDICINE

## 2022-08-03 RX ORDER — METOPROLOL SUCCINATE 25 MG/1
12.5 TABLET, EXTENDED RELEASE ORAL DAILY
Qty: 90 TABLET | Refills: 1 | Status: SHIPPED | OUTPATIENT
Start: 2022-08-03 | End: 2022-10-27

## 2022-08-03 RX ORDER — CETIRIZINE HYDROCHLORIDE 10 MG/1
10 TABLET ORAL DAILY
COMMUNITY
End: 2023-11-02

## 2022-08-03 RX ORDER — LISINOPRIL 2.5 MG/1
2.5 TABLET ORAL DAILY
Qty: 90 TABLET | Refills: 3 | Status: SHIPPED | OUTPATIENT
Start: 2022-08-03 | End: 2022-08-24 | Stop reason: SINTOL

## 2022-08-03 NOTE — PROGRESS NOTES
HPI and Plan:   Today, I had the pleasure of connecting with Genaro Whaley Schoenecker for a follow-up visit.  He is a pleasant 72 year old patient with a past medical history of CAD S/P CABG with LIMA to LAD, SVG to OM and PDA, ischemic cardiomyopathy with ejection fraction of 35%, hypertension, hyperlipidemia and mild to moderate bilateral carotid stenosis who presents to the clinic for a follow-up visit with his significant other.     The patient has not had any anginal symptoms since his last visit.  He had COVID in winter last year and was instructed to stop metoprolol and lisinopril during that hospital admission.  However, he has still not restarted taking them and wonders if he actually needs to.  He has history of severe myalgias when on statin and hence is now on Repatha.  Most recent lipid profile shows    He otherwise is doing well and does not report any chest pain or dyspnea on exertion.  His last echocardiogram was on 12/11/2018 prior to visit with Tiffani and showed mild increase in ejection fraction from 25-30 to 35%.  There was inferior, inferolateral and lateral wall hypokinesis.  CTA head and neck in 2018 and showed 60% right ICA stenosis and 30% left ICA stenosis.     Assessment and plan:    1.  CAD status post CABG x3  2.  Moderate carotid artery disease-60% right ICA, 30% left ICA  3.  Hyperlipidemia- on PCSK9, intolerant to statin  4.  Ischemic cardiomyopathy -recovered ejection fraction, EF 50%  5.  Hypotension     Discussion  The patient is stable from coronary disease standpoint and denies chest pain, shortness of breath or exertional dyspnea.  He will stay on aspirin indefinitely.  For moderate carotid artery disease, we will continue to aggressively modify his risk factors.  For hyperlipidemia, he is statin intolerance and was switched to Repatha.  He had blood drawn today and I will follow-up on the results of the fasting lipid profile.  The ischemic cardiomyopathy has near completely  resolved and most ejection fraction was 50-55%.  I will restart metoprolol and lisinopril today.   He continues to stay euvolemic of Lasix and I discussed titration of dose based on symptoms and weight with him today.    Plan  1.  Restart lisinopril and metoprolol.  2.  Eat heart healthy diet and exercise regularly.  3.  We will reach out to you with the results of the fasting lipid profile.    Thank you for allowing me to participate in the care of David Richard Schoenecker    This note was completed in part using Dragon voice recognition software. Although reviewed after completion, some word and grammatical errors may occur.    Ramses Felix MD  Cardiology    Orders Placed This Encounter   Procedures     Follow-Up with Cardiology       Orders Placed This Encounter   Medications     cetirizine (ZYRTEC) 10 MG tablet     Sig: Take 10 mg by mouth daily     evolocumab (REPATHA) 140 MG/ML prefilled autoinjector     Sig: Inject 1 mL (140 mg) Subcutaneous every 14 days     Dispense:  6 mL     Refill:  4     Order Specific Question:   Pharmacy Preference:     Answer:   [facility] with [facility]     lisinopril (ZESTRIL) 2.5 MG tablet     Sig: Take 1 tablet (2.5 mg) by mouth daily     Dispense:  90 tablet     Refill:  3     metoprolol succinate ER (TOPROL XL) 25 MG 24 hr tablet     Sig: Take 0.5 tablets (12.5 mg) by mouth daily     Dispense:  90 tablet     Refill:  1       Medications Discontinued During This Encounter   Medication Reason     lisinopril (ZESTRIL) 2.5 MG tablet Medication Reconciliation Clean Up     loratadine (CLARITIN) 10 MG tablet Alternate therapy     Naproxen Sodium (ALEVE PO) Stopped by Patient     evolocumab (REPATHA) 140 MG/ML prefilled autoinjector Reorder     metoprolol succinate ER (TOPROL-XL) 25 MG 24 hr tablet Reorder     lisinopril (ZESTRIL) 2.5 MG tablet        Encounter Diagnoses   Name Primary?     Coronary artery disease involving coronary bypass graft of native heart without angina pectoris       Bilateral carotid artery stenosis      Benign essential hypertension      Hyperlipidemia LDL goal <70      S/P CABG (coronary artery bypass graft)        CURRENT MEDICATIONS:  Current Outpatient Medications   Medication Sig Dispense Refill     calcium carbonate (TUMS) 500 MG chewable tablet Take 1 chew tab by mouth as needed for heartburn       cetirizine (ZYRTEC) 10 MG tablet Take 10 mg by mouth daily       evolocumab (REPATHA) 140 MG/ML prefilled autoinjector Inject 1 mL (140 mg) Subcutaneous every 14 days 6 mL 4     lisinopril (ZESTRIL) 2.5 MG tablet Take 1 tablet (2.5 mg) by mouth daily 90 tablet 3     metoprolol succinate ER (TOPROL XL) 25 MG 24 hr tablet Take 0.5 tablets (12.5 mg) by mouth daily 90 tablet 1     aspirin (ASA) 81 MG tablet Take 1 tablet (81 mg) by mouth daily (Patient not taking: Reported on 8/3/2022)         ALLERGIES     Allergies   Allergen Reactions     Hmg-Coa-R Inhibitors Dizziness       PAST MEDICAL HISTORY:  Past Medical History:   Diagnosis Date     Benign essential hypertension      Coronary artery disease     11/2018 CABG     Heart disease      Ischemic cardiomyopathy 12/2018    echo 12/2018- EF 35%     Mixed hyperlipidemia        PAST SURGICAL HISTORY:  Past Surgical History:   Procedure Laterality Date     APPENDECTOMY       BYPASS GRAFT ARTERY CORONARY N/A 11/6/2018    Procedure: CORONARY ARTERY BYPASS GRAFTING x 3 (LIMA - LAD; SV - OM; SV - PDA) WITH ENDOSCOPIC VEIN HARVEST ON THE LEFT LOWER EXTREMITY    (ON PUMP OXYGENATOR ; BAL BY KEYA)   ;  Surgeon: Sander Wan MD;  Location:  OR     ENT SURGERY      tonsillectomy       FAMILY HISTORY:  Family History   Problem Relation Age of Onset     Coronary Artery Disease Mother      Cerebrovascular Disease Mother      Myocardial Infarction Mother      Family History Negative Father      Unknown/Adopted Maternal Grandmother      Unknown/Adopted Maternal Grandfather      Unknown/Adopted Paternal Grandmother       Unknown/Adopted Paternal Grandfather      Heart Surgery Brother         bypass     Heart Surgery Brother         bypass     Heart Surgery Brother         bypass       SOCIAL HISTORY:  Social History     Socioeconomic History     Marital status:      Spouse name: None     Number of children: None     Years of education: None     Highest education level: None   Tobacco Use     Smoking status: Former Smoker     Start date: 1/1/1965     Quit date: 11/4/2018     Years since quitting: 3.7     Smokeless tobacco: Never Used     Tobacco comment: 11/4/18: last cigarette. 1/2 ppd. Off/on for 50 years   Substance and Sexual Activity     Alcohol use: No     Drug use: No       Review of Systems:  Skin:          Eyes:         ENT:         Respiratory:  Positive for dyspnea on exertion     Cardiovascular:  Negative for;palpitations;chest pain;edema;syncope or near-syncope;dizziness;lightheadedness      Gastroenterology:        Genitourinary:         Musculoskeletal:         Neurologic:         Psychiatric:         Heme/Lymph/Imm:         Endocrine:           Physical Exam:  Vitals: /72   Pulse 61   Temp 96.9  F (36.1  C) (Tympanic)   Wt 110.3 kg (243 lb 3.2 oz)   SpO2 94%   BMI 33.92 kg/m    Eyes: No icterus.  Pulmonary: Chest symmetric, lungs clear bilaterally and no crackles, wheezes or rales.  Cardiovascular: RRR with normal S1 and S2, no murmur, JVP normal.  Musculoskeletal: Edema of the lower extremities: None.  Neurologic: Oriented and appropriate without obvious focal deficits.   Psychiatric: Normal affect.     Recent Lab Results:  LIPID RESULTS:  Lab Results   Component Value Date    CHOL 181 08/03/2022    CHOL 163 09/14/2020    HDL 35 (L) 08/03/2022    HDL 37 (L) 09/14/2020     (H) 08/03/2022     (H) 09/14/2020    TRIG 128 08/03/2022    TRIG 105 09/14/2020       LIVER ENZYME RESULTS:  Lab Results   Component Value Date    AST 34 11/04/2018    ALT 16 09/14/2020       CBC RESULTS:  Lab Results    Component Value Date    WBC 8.5 11/11/2018    RBC 2.76 (L) 11/11/2018    HGB 10.4 (L) 12/05/2018    HCT 28.3 (L) 11/11/2018     (H) 11/11/2018    MCH 33.7 (H) 11/11/2018    MCHC 32.9 11/11/2018    RDW 12.9 11/11/2018     11/11/2018       BMP RESULTS:  Lab Results   Component Value Date     12/12/2018    POTASSIUM 4.4 12/12/2018    CHLORIDE 104 12/12/2018    CO2 29 12/12/2018    ANIONGAP 9.4 12/12/2018     (H) 12/12/2018    BUN 22 12/12/2018    CR 1.17 12/12/2018    GFRESTIMATED 62 12/12/2018    GFRESTBLACK 75 12/12/2018    CONOR 9.6 12/12/2018        A1C RESULTS:  Lab Results   Component Value Date    A1C 5.9 (H) 11/06/2018       INR RESULTS:  Lab Results   Component Value Date    INR 1.37 (H) 11/06/2018    INR 1.78 (H) 11/06/2018       CC  Ramses Felix MD  [address]  [place] [id number]    All medical records were reviewed in detail and discussed with the patient. Greater than 30 mins were spent with the patient, 50% of this time was spent on counseling and coordination of care.  After visit summary was printed and given to the patient.

## 2022-08-03 NOTE — LETTER
8/3/2022    Naseem Ramirez MD  [facility] [address]  [place]  [facility] [address]    RE: David Richard Schoenecker       Dear Colleague,     I had the pleasure of seeing Genaro Whaley Schoeneckroberto in the [facility].  HPI and Plan:   Today, I had the pleasure of connecting with Genaro Hernandezkokoroberto for a follow-up visit.  He is a pleasant 72 year old patient with a past medical history of CAD S/P CABG with LIMA to LAD, SVG to OM and PDA, ischemic cardiomyopathy with ejection fraction of 35%, hypertension, hyperlipidemia and mild to moderate bilateral carotid stenosis who presents to the clinic for a follow-up visit with his significant other.     The patient has not had any anginal symptoms since his last visit.  He had COVID in winter last year and was instructed to stop metoprolol and lisinopril during that hospital admission.  However, he has still not restarted taking them and wonders if he actually needs to.  He has history of severe myalgias when on statin and hence is now on Repatha.  Most recent lipid profile shows    He otherwise is doing well and does not report any chest pain or dyspnea on exertion.  His last echocardiogram was on 12/11/2018 prior to visit with Tiffani and showed mild increase in ejection fraction from 25-30 to 35%.  There was inferior, inferolateral and lateral wall hypokinesis.  CTA head and neck in 2018 and showed 60% right ICA stenosis and 30% left ICA stenosis.     Assessment and plan:    1.  CAD status post CABG x3  2.  Moderate carotid artery disease-60% right ICA, 30% left ICA  3.  Hyperlipidemia- on PCSK9, intolerant to statin  4.  Ischemic cardiomyopathy -recovered ejection fraction, EF 50%  5.  Hypotension     Discussion  The patient is stable from coronary disease standpoint and denies chest pain, shortness of breath or exertional dyspnea.  He will stay on aspirin indefinitely.  For moderate carotid artery disease, we will continue  to aggressively modify his risk factors.  For hyperlipidemia, he is statin intolerance and was switched to Repatha.  He had blood drawn today and I will follow-up on the results of the fasting lipid profile.  The ischemic cardiomyopathy has near completely resolved and most ejection fraction was 50-55%.  I will restart metoprolol and lisinopril today.   He continues to stay euvolemic of Lasix and I discussed titration of dose based on symptoms and weight with him today.    Plan  1.  Restart lisinopril and metoprolol.  2.  Eat heart healthy diet and exercise regularly.  3.  We will reach out to you with the results of the fasting lipid profile.    Thank you for allowing me to participate in the care of David Richard Schoenecker    This note was completed in part using Dragon voice recognition software. Although reviewed after completion, some word and grammatical errors may occur.    Ramses Felix MD  Cardiology    Orders Placed This Encounter   Procedures     Follow-Up with Cardiology       Orders Placed This Encounter   Medications     cetirizine (ZYRTEC) 10 MG tablet     Sig: Take 10 mg by mouth daily     evolocumab (REPATHA) 140 MG/ML prefilled autoinjector     Sig: Inject 1 mL (140 mg) Subcutaneous every 14 days     Dispense:  6 mL     Refill:  4     Order Specific Question:   Pharmacy Preference:     Answer:   [facility]     lisinopril (ZESTRIL) 2.5 MG tablet     Sig: Take 1 tablet (2.5 mg) by mouth daily     Dispense:  90 tablet     Refill:  3     metoprolol succinate ER (TOPROL XL) 25 MG 24 hr tablet     Sig: Take 0.5 tablets (12.5 mg) by mouth daily     Dispense:  90 tablet     Refill:  1       Medications Discontinued During This Encounter   Medication Reason     lisinopril (ZESTRIL) 2.5 MG tablet Medication Reconciliation Clean Up     loratadine (CLARITIN) 10 MG tablet Alternate therapy     Naproxen Sodium (ALEVE PO) Stopped by Patient     evolocumab (REPATHA) 140 MG/ML prefilled autoinjector Reorder      metoprolol succinate ER (TOPROL-XL) 25 MG 24 hr tablet Reorder     lisinopril (ZESTRIL) 2.5 MG tablet        Encounter Diagnoses   Name Primary?     Coronary artery disease involving coronary bypass graft of native heart without angina pectoris      Bilateral carotid artery stenosis      Benign essential hypertension      Hyperlipidemia LDL goal <70      S/P CABG (coronary artery bypass graft)        CURRENT MEDICATIONS:  Current Outpatient Medications   Medication Sig Dispense Refill     calcium carbonate (TUMS) 500 MG chewable tablet Take 1 chew tab by mouth as needed for heartburn       cetirizine (ZYRTEC) 10 MG tablet Take 10 mg by mouth daily       evolocumab (REPATHA) 140 MG/ML prefilled autoinjector Inject 1 mL (140 mg) Subcutaneous every 14 days 6 mL 4     lisinopril (ZESTRIL) 2.5 MG tablet Take 1 tablet (2.5 mg) by mouth daily 90 tablet 3     metoprolol succinate ER (TOPROL XL) 25 MG 24 hr tablet Take 0.5 tablets (12.5 mg) by mouth daily 90 tablet 1     aspirin (ASA) 81 MG tablet Take 1 tablet (81 mg) by mouth daily (Patient not taking: Reported on 8/3/2022)         ALLERGIES     Allergies   Allergen Reactions     Hmg-Coa-R Inhibitors Dizziness       PAST MEDICAL HISTORY:  Past Medical History:   Diagnosis Date     Benign essential hypertension      Coronary artery disease     11/2018 CABG     Heart disease      Ischemic cardiomyopathy 12/2018    echo 12/2018- EF 35%     Mixed hyperlipidemia        PAST SURGICAL HISTORY:  Past Surgical History:   Procedure Laterality Date     APPENDECTOMY       BYPASS GRAFT ARTERY CORONARY N/A 11/6/2018    Procedure: CORONARY ARTERY BYPASS GRAFTING x 3 (LIMA - LAD; SV - OM; SV - PDA) WITH ENDOSCOPIC VEIN HARVEST ON THE LEFT LOWER EXTREMITY    (ON PUMP OXYGENATOR ; BAL BY KEYA)   ;  Surgeon: Sander Wan MD;  Location:  OR     ENT SURGERY      tonsillectomy       FAMILY HISTORY:  Family History   Problem Relation Age of Onset     Coronary Artery Disease  Mother      Cerebrovascular Disease Mother      Myocardial Infarction Mother      Family History Negative Father      Unknown/Adopted Maternal Grandmother      Unknown/Adopted Maternal Grandfather      Unknown/Adopted Paternal Grandmother      Unknown/Adopted Paternal Grandfather      Heart Surgery Brother         bypass     Heart Surgery Brother         bypass     Heart Surgery Brother         bypass       SOCIAL HISTORY:  Social History     Socioeconomic History     Marital status:      Spouse name: None     Number of children: None     Years of education: None     Highest education level: None   Tobacco Use     Smoking status: Former Smoker     Start date: 1/1/1965     Quit date: 11/4/2018     Years since quitting: 3.7     Smokeless tobacco: Never Used     Tobacco comment: 11/4/18: last cigarette. 1/2 ppd. Off/on for 50 years   Substance and Sexual Activity     Alcohol use: No     Drug use: No       Review of Systems:  Skin:          Eyes:         ENT:         Respiratory:  Positive for dyspnea on exertion     Cardiovascular:  Negative for;palpitations;chest pain;edema;syncope or near-syncope;dizziness;lightheadedness      Gastroenterology:        Genitourinary:         Musculoskeletal:         Neurologic:         Psychiatric:         Heme/Lymph/Imm:         Endocrine:           Physical Exam:  Vitals: /72   Pulse 61   Temp 96.9  F (36.1  C) (Tympanic)   Wt 110.3 kg (243 lb 3.2 oz)   SpO2 94%   BMI 33.92 kg/m    Eyes: No icterus.  Pulmonary: Chest symmetric, lungs clear bilaterally and no crackles, wheezes or rales.  Cardiovascular: RRR with normal S1 and S2, no murmur, JVP normal.  Musculoskeletal: Edema of the lower extremities: None.  Neurologic: Oriented and appropriate without obvious focal deficits.   Psychiatric: Normal affect.     Recent Lab Results:  LIPID RESULTS:  Lab Results   Component Value Date    CHOL 181 08/03/2022    CHOL 163 09/14/2020    HDL 35 (L) 08/03/2022    HDL 37 (L)  09/14/2020     (H) 08/03/2022     (H) 09/14/2020    TRIG 128 08/03/2022    TRIG 105 09/14/2020       LIVER ENZYME RESULTS:  Lab Results   Component Value Date    AST 34 11/04/2018    ALT 16 09/14/2020       CBC RESULTS:  Lab Results   Component Value Date    WBC 8.5 11/11/2018    RBC 2.76 (L) 11/11/2018    HGB 10.4 (L) 12/05/2018    HCT 28.3 (L) 11/11/2018     (H) 11/11/2018    MCH 33.7 (H) 11/11/2018    MCHC 32.9 11/11/2018    RDW 12.9 11/11/2018     11/11/2018       BMP RESULTS:  Lab Results   Component Value Date     12/12/2018    POTASSIUM 4.4 12/12/2018    CHLORIDE 104 12/12/2018    CO2 29 12/12/2018    ANIONGAP 9.4 12/12/2018     (H) 12/12/2018    BUN 22 12/12/2018    CR 1.17 12/12/2018    GFRESTIMATED 62 12/12/2018    GFRESTBLACK 75 12/12/2018    CONOR 9.6 12/12/2018        A1C RESULTS:  Lab Results   Component Value Date    A1C 5.9 (H) 11/06/2018       INR RESULTS:  Lab Results   Component Value Date    INR 1.37 (H) 11/06/2018    INR 1.78 (H) 11/06/2018       CC  Ramses Felix MD  [address] [facility] [address]  [place] [address]    All medical records were reviewed in detail and discussed with the patient. Greater than 30 mins were spent with the patient, 50% of this time was spent on counseling and coordination of care.  After visit summary was printed and given to the patient.    Thank you for allowing me to participate in the care of your patient.      Sincerely,     Ramses Felix MD     [facility]

## 2022-08-04 ENCOUNTER — TELEPHONE (OUTPATIENT)
Dept: CARDIOLOGY | Facility: CLINIC | Age: 72
End: 2022-08-04

## 2022-08-04 DIAGNOSIS — Z95.1 S/P CABG (CORONARY ARTERY BYPASS GRAFT): ICD-10-CM

## 2022-08-04 DIAGNOSIS — I25.810 CORONARY ARTERY DISEASE INVOLVING CORONARY BYPASS GRAFT OF NATIVE HEART WITHOUT ANGINA PECTORIS: Primary | ICD-10-CM

## 2022-08-04 DIAGNOSIS — E78.5 HYPERLIPIDEMIA LDL GOAL <70: ICD-10-CM

## 2022-08-04 NOTE — TELEPHONE ENCOUNTER
----- Message from Ramses Felix MD sent at 8/3/2022 11:30 AM CDT -----  Patient is taking Repatha.  However, his LDL cholesterol has gone up and down.  Not sure how this is possible.  Maybe we should check with him to see how he injects Repatha.  There is an demo video on their website which he can look at to make sure he is doing it correctly.  We can then check LDL cholesterol in 3 months.  If this does not work we can change the dosing to 420 mg q. monthly.    Thanks,  Ramses    ADDENDUM: LM for pt to call back to discuss. Tiffany Manuel RN Cardiology August 4, 2022, 11:04 AM

## 2022-08-08 NOTE — TELEPHONE ENCOUNTER
Pt called back to discuss. Went over Repatha dosing and technique. Pt takes Repatha every 2 weeks. Went through his technique and it appears correct. He holds down the plunger until he hears the second click and looks to make sure the liquid is all gone in the window. He will recheck lipids in 3 months--appt made for 10/24/22 as he is leaving for [place] the following week. Orders placed. Instructed him to be sure to have a full 12 hour fast prior to labs. He verbalized understanding and agreed with plan. Tiffany Manuel RN Cardiology August 8, 2022, 10:47 AM

## 2022-08-23 ENCOUNTER — TELEPHONE (OUTPATIENT)
Dept: CARDIOLOGY | Facility: CLINIC | Age: 72
End: 2022-08-23

## 2022-08-23 NOTE — TELEPHONE ENCOUNTER
Last visit 8/3/22 with Dr. Felix. BP at visit 128/72, HR 61 bpm. Per Dr. Felix's note, pt stable. Had been hospitalized in winter 2021 with COVID and Metoprolol and Lisinopril were stopped. Restarted Metoprolol and Lisinopril (hx of ICM, with almost complete resolution, most recent EF 50-55%). Orders for follow up with Cardiology 8/2023.    Called pt, he reports last summer when he was on Lisinopril and Metoprolol he had bouts of lightheadedness even when driving, and that concerned him then. He denied any lightheaded or dizziness prior to restarting the Metoprolol and Lisinopril. He does not have way of checking BP or HR at home. He reports restarted both Metoprolol 12.5mg and Lisinopril 2.5mg together as instructed after last visit. He has been having increased lightheadedness and dizziness even when just sitting down, so he stopped the Lisinopril last week and symptoms have completely resolved. He continues on Metoprolol 12.5mg daily. Advised pt to stay off Lisinopril for now and continue Metoprolol. Will send to Dr. Felix for review and get back to pt once we hear back. Pt in agreement with plan.     Katie Phillips RN, BSN  08/23/22 at 9:32 AM

## 2022-08-23 NOTE — TELEPHONE ENCOUNTER
----- Message from Mary Ann Shipley sent at 8/23/2022  8:37 AM CDT -----  Regarding: Medication Side Effects  Contact: [phone]  Message from Cardiology Nurse Voice Mail: [phone]  Date: 8/23/2022  Regarding:  Medication:  started taking Lisinopril as recommended and got lightheaded and dizzy spells after starting med again.  Stopped taking it last week and symptoms improved.  Please advise what to do next.      Please call:  [phone]    Mary Ann Shipley MA Cardiology   8/23/2022 8:39 AM

## 2022-08-24 NOTE — TELEPHONE ENCOUNTER
"Received following message from Dr Felix:   \"Discontinue Lisinopril. If is able to tolerate BB he should continue that. Thanks\". LM for pt regarding recommendation and ask that he call us back if symptoms are not resolving or are returning. Med list updated. Tiffany Manuel RN Cardiology August 24, 2022, 1:37 PM    "

## 2022-10-27 DIAGNOSIS — Z95.1 S/P CABG (CORONARY ARTERY BYPASS GRAFT): ICD-10-CM

## 2022-10-27 RX ORDER — METOPROLOL SUCCINATE 25 MG/1
12.5 TABLET, EXTENDED RELEASE ORAL DAILY
Qty: 45 TABLET | Refills: 2 | Status: SHIPPED | OUTPATIENT
Start: 2022-10-27 | End: 2023-08-16

## 2023-07-10 ENCOUNTER — TELEPHONE (OUTPATIENT)
Dept: CARDIOLOGY | Facility: CLINIC | Age: 73
End: 2023-07-10
Payer: COMMERCIAL

## 2023-07-10 NOTE — TELEPHONE ENCOUNTER
[facility]    Fax: [phone]    [place]: [phone]    [place]: [phone]

## 2023-07-10 NOTE — TELEPHONE ENCOUNTER
Central Prior Authorization Team   Phone: [phone]    PA Initiation    Medication: Repatha Sureclick 140mg/ml soaj  Insurance Company: [employer] - Phone [phone] Fax [phone]  Pharmacy Filling the Rx: [facility] - [place], [place] - [address]  Filling Pharmacy Phone: [phone]  Filling Pharmacy Fax:    Start Date: 7/10/2023

## 2023-07-11 NOTE — TELEPHONE ENCOUNTER
Prior Authorization Approval    Authorization Effective Date: 6/10/2023  Authorization Expiration Date: 7/9/2026  Medication: Repatha Sureclick 140mg/ml soaj  Approved Dose/Quantity:   Reference #:     Insurance Company: [employer] - Phone [phone] Fax [phone]  Expected CoPay:       CoPay Card Available:      Foundation Assistance Needed:    Which Pharmacy is filling the prescription (Not needed for infusion/clinic administered): [facility] - [place] [address]  Pharmacy Notified: Yes  Patient Notified: Yes

## 2023-08-16 ENCOUNTER — NURSE TRIAGE (OUTPATIENT)
Dept: CARDIOLOGY | Facility: CLINIC | Age: 73
End: 2023-08-16

## 2023-08-16 ENCOUNTER — TELEPHONE (OUTPATIENT)
Dept: OTHER | Facility: CLINIC | Age: 73
End: 2023-08-16
Payer: COMMERCIAL

## 2023-08-16 DIAGNOSIS — Z95.1 S/P CABG (CORONARY ARTERY BYPASS GRAFT): Primary | ICD-10-CM

## 2023-08-16 DIAGNOSIS — Z95.1 S/P CABG (CORONARY ARTERY BYPASS GRAFT): ICD-10-CM

## 2023-08-16 RX ORDER — METOPROLOL SUCCINATE 25 MG/1
12.5 TABLET, EXTENDED RELEASE ORAL DAILY
Qty: 45 TABLET | Refills: 0 | Status: SHIPPED | OUTPATIENT
Start: 2023-08-16 | End: 2023-11-02

## 2023-08-16 NOTE — TELEPHONE ENCOUNTER
Routing call to CV surgery team-    Looks like pt s/p CABG 2018 with Dr. Alexandre. Pt c/o jabbing pain in the middle of his rib cage on the right side. He thinks a wire may have come loose from his sternum. Also has a great big bulge right at the bottom of his ribcage     Reviewed with Bridgett Mehta CNP who recommended we forward to CV surgery for an assessment.     Ella Mcfarlane RN

## 2023-08-16 NOTE — TELEPHONE ENCOUNTER
"Additional Information    Negative: Nursing judgment    Negative: Nursing judgment    Negative: Nursing judgment    Negative: Nursing judgment    Negative: Information only question and nurse able to answer    Answer Assessment - Initial Assessment Questions  1. REASON FOR CALL or QUESTION: \"What is your reason for calling today?\" or \"How can I best help you?\" or \"What question do you have that I can help answer?\"      Patient called to schedule a visit with Cardiology. He has a jabbing pain in the middle of his rib cage on the right side. He has had it for a year or so. Mostly feels it at rest. He said when we were talking he was sitting at the table with his elbows on the table . He can feel that jabbing. He thinks a wire may have come loose from his sternum. (S/p CABG). He said he also has a great big bulge right at the bottom of his ribcage. That has been there for a year or more, too. He said he has never had either checked by a doctor. These things do not limit his activity. He hauls groceries up the hill from his garage to the house. He just cut up some trees that fell and stacked the wood. He plays golf. He is scheduled to see his primary provider 9/7 for his annual checkup. He is scheduled to see Cardiology 11/2. I will route this to Dr. Felix. It is NOT a triage call.    Protocols used: Information Only Call - No Triage-A-OH    "

## 2023-08-16 NOTE — TELEPHONE ENCOUNTER
Patient messaged that he has a pain right rib cage for last year and questioned if it could be a sternal wire that broke. CXR ordered and will have my facilitator help him schedule to evaluate sternum.

## 2023-08-17 ENCOUNTER — DOCUMENTATION ONLY (OUTPATIENT)
Dept: CARDIOLOGY | Facility: CLINIC | Age: 73
End: 2023-08-17
Payer: COMMERCIAL

## 2023-08-21 ENCOUNTER — DOCUMENTATION ONLY (OUTPATIENT)
Dept: OTHER | Facility: CLINIC | Age: 73
End: 2023-08-21
Payer: COMMERCIAL

## 2023-08-21 ENCOUNTER — HOSPITAL ENCOUNTER (OUTPATIENT)
Dept: GENERAL RADIOLOGY | Facility: CLINIC | Age: 73
Discharge: HOME | End: 2023-08-21
Attending: SURGERY | Admitting: SURGERY
Payer: COMMERCIAL

## 2023-08-21 DIAGNOSIS — Z95.1 S/P CABG (CORONARY ARTERY BYPASS GRAFT): ICD-10-CM

## 2023-08-21 PROCEDURE — 71046 X-RAY EXAM CHEST 2 VIEWS: CPT

## 2023-08-21 NOTE — PROGRESS NOTES
CXR done and reviewed by Dr. Wan. No sternal wire fracture, sternum intact. Talking with patient his complaint is of pain below his sternum and states he has an outpouching. Outpouching noted on lateral view. Instructed patient to see his PCP for hernia evaluation and general surgery consult. Patient states it started 2 months after surgery in 2018 and has not had it evaluated. We are happy to see if PCP requests.

## 2023-11-02 ENCOUNTER — OFFICE VISIT (OUTPATIENT)
Dept: CARDIOLOGY | Facility: CLINIC | Age: 73
End: 2023-11-02
Payer: COMMERCIAL

## 2023-11-02 VITALS
BODY MASS INDEX: 34.61 KG/M2 | RESPIRATION RATE: 16 BRPM | HEART RATE: 72 BPM | SYSTOLIC BLOOD PRESSURE: 136 MMHG | WEIGHT: 247.2 LBS | OXYGEN SATURATION: 98 % | HEIGHT: 71 IN | DIASTOLIC BLOOD PRESSURE: 85 MMHG

## 2023-11-02 DIAGNOSIS — Z95.1 S/P CABG (CORONARY ARTERY BYPASS GRAFT): ICD-10-CM

## 2023-11-02 PROCEDURE — 99214 OFFICE O/P EST MOD 30 MIN: CPT | Performed by: INTERNAL MEDICINE

## 2023-11-02 NOTE — PROGRESS NOTES
HPI and Plan:   Today, I had the pleasure of connecting with Genaro Whaley Schoeneckroberto for a follow-up visit.  He is a pleasant 73 year old patient with a past medical history of CAD S/P CABG with LIMA to LAD, SVG to OM and PDA, ischemic cardiomyopathy with ejection fraction of 35%, hypertension, hyperlipidemia and mild to moderate bilateral carotid stenosis who presents to the clinic for a follow-up visit with his significant other.     The patient has not had any anginal symptoms since his last visit.  He is on a tiny dose of metoprolol and report generalized fatigue  He has history of severe myalgias when on statin and hence is now on Repatha.  Most recent lipid profile shows    He otherwise is doing well and does not report any chest pain or dyspnea on exertion.  His last echocardiogram showed mild increase in ejection fraction from 25-30 to 50%.  There was inferior, inferolateral and lateral wall hypokinesis.  CTA head and neck in 2018 and showed 60% right ICA stenosis and 30% left ICA stenosis.     Assessment and plan:    1.  CAD status post CABG x3  2.  Moderate carotid artery disease-60% right ICA, 30% left ICA  3.  Hyperlipidemia- on PCSK9, intolerant to statin  4.  Ischemic cardiomyopathy -recovered ejection fraction, EF 50%  5.  Hypotension     Discussion  The patient is stable from coronary disease standpoint and denies chest pain, shortness of breath or exertional dyspnea.  He should be on aspirin indefinitely.  For moderate carotid artery disease, we will continue to aggressively modify his risk factors.  For hyperlipidemia, he is statin intolerance and was switched to Repatha.    The ischemic cardiomyopathy has near completely resolved and most ejection fraction was 50-55% and he has not required lasix recently.   He will stop BB for a few weeks to see if that helps with fatigue. I presume his symptoms are more due to deconditioning.     Plan  1.  Stop metoprolol.  2.  Eat heart healthy diet and  exercise regularly.  3.  Follow up in 1 yr    Thank you for allowing me to participate in the care of Genaro Napiernejose martin    This note was completed in part using Dragon voice recognition software. Although reviewed after completion, some word and grammatical errors may occur.    Ramses Felix MD  Cardiology    No orders of the defined types were placed in this encounter.      No orders of the defined types were placed in this encounter.      There are no discontinued medications.      Encounter Diagnosis   Name Primary?    S/P CABG (coronary artery bypass graft)        CURRENT MEDICATIONS:  Current Outpatient Medications   Medication Sig Dispense Refill    calcium carbonate (TUMS) 500 MG chewable tablet Take 1 chew tab by mouth as needed for heartburn      evolocumab (REPATHA) 140 MG/ML prefilled autoinjector Inject 1 mL (140 mg) Subcutaneous every 14 days 6 mL 4    metoprolol succinate ER (TOPROL XL) 25 MG 24 hr tablet Take 0.5 tablets (12.5 mg) by mouth daily 45 tablet 0    aspirin (ASA) 81 MG tablet Take 1 tablet (81 mg) by mouth daily (Patient not taking: Reported on 8/3/2022)      cetirizine (ZYRTEC) 10 MG tablet Take 10 mg by mouth daily (Patient not taking: Reported on 11/2/2023)         ALLERGIES     Allergies   Allergen Reactions    Statins Dizziness       PAST MEDICAL HISTORY:  Past Medical History:   Diagnosis Date    Benign essential hypertension     Coronary artery disease     11/2018 CABG    Heart disease     Ischemic cardiomyopathy 12/2018    echo 12/2018- EF 35%    Mixed hyperlipidemia        PAST SURGICAL HISTORY:  Past Surgical History:   Procedure Laterality Date    APPENDECTOMY      BYPASS GRAFT ARTERY CORONARY N/A 11/6/2018    Procedure: CORONARY ARTERY BYPASS GRAFTING x 3 (LIMA - LAD; SV - OM; SV - PDA) WITH ENDOSCOPIC VEIN HARVEST ON THE LEFT LOWER EXTREMITY    (ON PUMP OXYGENATOR ; BAL BY KEYA)   ;  Surgeon: Sander Wan MD;  Location: [facility] OR    ENT SURGERY       "tonsillectomy       FAMILY HISTORY:  Family History   Problem Relation Age of Onset    Coronary Artery Disease Mother     Cerebrovascular Disease Mother     Myocardial Infarction Mother     Family History Negative Father     Unknown/Adopted Maternal Grandmother     Unknown/Adopted Maternal Grandfather     Unknown/Adopted Paternal Grandmother     Unknown/Adopted Paternal Grandfather     Heart Surgery Brother         bypass    Heart Surgery Brother         bypass    Heart Surgery Brother         bypass       SOCIAL HISTORY:  Social History     Socioeconomic History    Marital status:      Spouse name: None    Number of children: None    Years of education: None    Highest education level: None   Tobacco Use    Smoking status: Former     Types: Cigarettes     Start date: 1965     Quit date: 2018     Years since quittin.9    Smokeless tobacco: Never    Tobacco comments:     18: last cigarette.  ppd. Off/on for 50 years   Substance and Sexual Activity    Alcohol use: No    Drug use: No       Review of Systems:  Skin:          Eyes:         ENT:         Respiratory:    shortness of breath     Cardiovascular:  Negative      Gastroenterology:        Genitourinary:         Musculoskeletal:         Neurologic:         Psychiatric:         Heme/Lymph/Imm:         Endocrine:           Physical Exam:  Vitals: /85 (BP Location: Left arm, Patient Position: Sitting, Cuff Size: Adult Large)   Pulse 72   Resp 16   Ht 1.803 m (5' 11\")   Wt 112.1 kg (247 lb 3.2 oz)   SpO2 98%   BMI 34.48 kg/m    Eyes: No icterus.  Pulmonary: Chest symmetric, lungs clear bilaterally and no crackles, wheezes or rales.  Cardiovascular: RRR with normal S1 and S2, no murmur, JVP normal.  Musculoskeletal: Edema of the lower extremities: None.  Neurologic: Oriented and appropriate without obvious focal deficits.   Psychiatric: Normal affect.     Recent Lab Results:  LIPID RESULTS:  Lab Results   Component Value Date    " CHOL 181 08/03/2022    CHOL 163 09/14/2020    HDL 35 (L) 08/03/2022    HDL 37 (L) 09/14/2020     (H) 08/03/2022     (H) 09/14/2020    TRIG 128 08/03/2022    TRIG 105 09/14/2020       LIVER ENZYME RESULTS:  Lab Results   Component Value Date    AST 34 11/04/2018    ALT 16 09/14/2020       CBC RESULTS:  Lab Results   Component Value Date    WBC 8.5 11/11/2018    RBC 2.76 (L) 11/11/2018    HGB 10.4 (L) 12/05/2018    HCT 28.3 (L) 11/11/2018     (H) 11/11/2018    MCH 33.7 (H) 11/11/2018    MCHC 32.9 11/11/2018    RDW 12.9 11/11/2018     11/11/2018       BMP RESULTS:  Lab Results   Component Value Date     12/12/2018    POTASSIUM 4.4 12/12/2018    CHLORIDE 104 12/12/2018    CO2 29 12/12/2018    ANIONGAP 9.4 12/12/2018     (H) 12/12/2018    BUN 22 12/12/2018    CR 1.17 12/12/2018    GFRESTIMATED 62 12/12/2018    GFRESTBLACK 75 12/12/2018    CONOR 9.6 12/12/2018        A1C RESULTS:  Lab Results   Component Value Date    A1C 5.9 (H) 11/06/2018       INR RESULTS:  Lab Results   Component Value Date    INR 1.37 (H) 11/06/2018    INR 1.78 (H) 11/06/2018       CC  Ramses Felix MD  [address]  [place] [id number]    All medical records were reviewed in detail and discussed with the patient. Greater than 30 mins were spent with the patient, 50% of this time was spent on counseling and coordination of care.  After visit summary was printed and given to the patient.

## 2023-11-02 NOTE — LETTER
11/2/2023    Naseem Ramirez MD  [facility] [address]  [place]  [facility] [address]    RE: David Richard Schoenecker       Dear Colleague,     I had the pleasure of seeing Genaro Napiernejose martin in the [facility].  HPI and Plan:   Today, I had the pleasure of connecting with Genaro Hernandezkokoroberto for a follow-up visit.  He is a pleasant 73 year old patient with a past medical history of CAD S/P CABG with LIMA to LAD, SVG to OM and PDA, ischemic cardiomyopathy with ejection fraction of 35%, hypertension, hyperlipidemia and mild to moderate bilateral carotid stenosis who presents to the clinic for a follow-up visit with his significant other.     The patient has not had any anginal symptoms since his last visit.  He is on a tiny dose of metoprolol and report generalized fatigue  He has history of severe myalgias when on statin and hence is now on Repatha.  Most recent lipid profile shows    He otherwise is doing well and does not report any chest pain or dyspnea on exertion.  His last echocardiogram showed mild increase in ejection fraction from 25-30 to 50%.  There was inferior, inferolateral and lateral wall hypokinesis.  CTA head and neck in 2018 and showed 60% right ICA stenosis and 30% left ICA stenosis.     Assessment and plan:    1.  CAD status post CABG x3  2.  Moderate carotid artery disease-60% right ICA, 30% left ICA  3.  Hyperlipidemia- on PCSK9, intolerant to statin  4.  Ischemic cardiomyopathy -recovered ejection fraction, EF 50%  5.  Hypotension     Discussion  The patient is stable from coronary disease standpoint and denies chest pain, shortness of breath or exertional dyspnea.  He should be on aspirin indefinitely.  For moderate carotid artery disease, we will continue to aggressively modify his risk factors.  For hyperlipidemia, he is statin intolerance and was switched to Repatha.    The ischemic cardiomyopathy has near completely resolved and most  ejection fraction was 50-55% and he has not required lasix recently.   He will stop BB for a few weeks to see if that helps with fatigue. I presume his symptoms are more due to deconditioning.     Plan  1.  Stop metoprolol.  2.  Eat heart healthy diet and exercise regularly.  3.  Follow up in 1 yr    Thank you for allowing me to participate in the care of David Richard Schoenecker    This note was completed in part using Dragon voice recognition software. Although reviewed after completion, some word and grammatical errors may occur.    Ramses Felix MD  Cardiology    No orders of the defined types were placed in this encounter.      No orders of the defined types were placed in this encounter.      There are no discontinued medications.      Encounter Diagnosis   Name Primary?    S/P CABG (coronary artery bypass graft)        CURRENT MEDICATIONS:  Current Outpatient Medications   Medication Sig Dispense Refill    calcium carbonate (TUMS) 500 MG chewable tablet Take 1 chew tab by mouth as needed for heartburn      evolocumab (REPATHA) 140 MG/ML prefilled autoinjector Inject 1 mL (140 mg) Subcutaneous every 14 days 6 mL 4    metoprolol succinate ER (TOPROL XL) 25 MG 24 hr tablet Take 0.5 tablets (12.5 mg) by mouth daily 45 tablet 0    aspirin (ASA) 81 MG tablet Take 1 tablet (81 mg) by mouth daily (Patient not taking: Reported on 8/3/2022)      cetirizine (ZYRTEC) 10 MG tablet Take 10 mg by mouth daily (Patient not taking: Reported on 11/2/2023)         ALLERGIES     Allergies   Allergen Reactions    Statins Dizziness       PAST MEDICAL HISTORY:  Past Medical History:   Diagnosis Date    Benign essential hypertension     Coronary artery disease     11/2018 CABG    Heart disease     Ischemic cardiomyopathy 12/2018    echo 12/2018- EF 35%    Mixed hyperlipidemia        PAST SURGICAL HISTORY:  Past Surgical History:   Procedure Laterality Date    APPENDECTOMY      BYPASS GRAFT ARTERY CORONARY N/A 11/6/2018     "Procedure: CORONARY ARTERY BYPASS GRAFTING x 3 (LIMA - LAD; SV - OM; SV - PDA) WITH ENDOSCOPIC VEIN HARVEST ON THE LEFT LOWER EXTREMITY    (ON PUMP OXYGENATOR ; BAL BY KEYA)   ;  Surgeon: Sander Wan MD;  Location:  OR    ENT SURGERY      tonsillectomy       FAMILY HISTORY:  Family History   Problem Relation Age of Onset    Coronary Artery Disease Mother     Cerebrovascular Disease Mother     Myocardial Infarction Mother     Family History Negative Father     Unknown/Adopted Maternal Grandmother     Unknown/Adopted Maternal Grandfather     Unknown/Adopted Paternal Grandmother     Unknown/Adopted Paternal Grandfather     Heart Surgery Brother         bypass    Heart Surgery Brother         bypass    Heart Surgery Brother         bypass       SOCIAL HISTORY:  Social History     Socioeconomic History    Marital status:      Spouse name: None    Number of children: None    Years of education: None    Highest education level: None   Tobacco Use    Smoking status: Former     Types: Cigarettes     Start date: 1965     Quit date: 2018     Years since quittin.9    Smokeless tobacco: Never    Tobacco comments:     18: last cigarette. 1/2 ppd. Off/on for 50 years   Substance and Sexual Activity    Alcohol use: No    Drug use: No       Review of Systems:  Skin:          Eyes:         ENT:         Respiratory:    shortness of breath     Cardiovascular:  Negative      Gastroenterology:        Genitourinary:         Musculoskeletal:         Neurologic:         Psychiatric:         Heme/Lymph/Imm:         Endocrine:           Physical Exam:  Vitals: /85 (BP Location: Left arm, Patient Position: Sitting, Cuff Size: Adult Large)   Pulse 72   Resp 16   Ht 1.803 m (5' 11\")   Wt 112.1 kg (247 lb 3.2 oz)   SpO2 98%   BMI 34.48 kg/m    Eyes: No icterus.  Pulmonary: Chest symmetric, lungs clear bilaterally and no crackles, wheezes or rales.  Cardiovascular: RRR with normal S1 and S2, no " murmur, JVP normal.  Musculoskeletal: Edema of the lower extremities: None.  Neurologic: Oriented and appropriate without obvious focal deficits.   Psychiatric: Normal affect.     Recent Lab Results:  LIPID RESULTS:  Lab Results   Component Value Date    CHOL 181 08/03/2022    CHOL 163 09/14/2020    HDL 35 (L) 08/03/2022    HDL 37 (L) 09/14/2020     (H) 08/03/2022     (H) 09/14/2020    TRIG 128 08/03/2022    TRIG 105 09/14/2020       LIVER ENZYME RESULTS:  Lab Results   Component Value Date    AST 34 11/04/2018    ALT 16 09/14/2020       CBC RESULTS:  Lab Results   Component Value Date    WBC 8.5 11/11/2018    RBC 2.76 (L) 11/11/2018    HGB 10.4 (L) 12/05/2018    HCT 28.3 (L) 11/11/2018     (H) 11/11/2018    MCH 33.7 (H) 11/11/2018    MCHC 32.9 11/11/2018    RDW 12.9 11/11/2018     11/11/2018       BMP RESULTS:  Lab Results   Component Value Date     12/12/2018    POTASSIUM 4.4 12/12/2018    CHLORIDE 104 12/12/2018    CO2 29 12/12/2018    ANIONGAP 9.4 12/12/2018     (H) 12/12/2018    BUN 22 12/12/2018    CR 1.17 12/12/2018    GFRESTIMATED 62 12/12/2018    GFRESTBLACK 75 12/12/2018    CONOR 9.6 12/12/2018        A1C RESULTS:  Lab Results   Component Value Date    A1C 5.9 (H) 11/06/2018       INR RESULTS:  Lab Results   Component Value Date    INR 1.37 (H) 11/06/2018    INR 1.78 (H) 11/06/2018       CC  Ramses Felix MD  [address]  [place] [id number]    All medical records were reviewed in detail and discussed with the patient. Greater than 30 mins were spent with the patient, 50% of this time was spent on counseling and coordination of care.  After visit summary was printed and given to the patient.      Thank you for allowing me to participate in the care of your patient.      Sincerely,     Ramses Felix MD     [facility]  cc:   Ramses Felix MD  [address]  [place] [id number]

## 2023-11-13 DIAGNOSIS — E78.5 HYPERLIPIDEMIA LDL GOAL <70: ICD-10-CM

## 2023-11-13 NOTE — TELEPHONE ENCOUNTER
[facility] Cardiology Refill Guideline reviewed.  Medication meets criteria for refill. Tiffany Manuel RN Cardiology November 13, 2023, 1:51 PM

## 2023-11-13 NOTE — TELEPHONE ENCOUNTER
Last Office Visit: 11/2/23 (Isidro)   Next Office Visit: TBD  Last Fill Date: 7/18/23  Liza Blum LPN Cardiology   11/13/2023 1:50 PM

## 2024-11-19 DIAGNOSIS — E78.5 HYPERLIPIDEMIA LDL GOAL <70: ICD-10-CM

## 2024-11-19 NOTE — TELEPHONE ENCOUNTER
Last Office Visit: 11/2/23 (Isidro)  Next Office Visit: TBD  Last Fill Date: 8/2/24  Liza Blum LPN Cardiology   11/19/2024 1:27 PM

## 2024-11-19 NOTE — TELEPHONE ENCOUNTER
[facility] Cardiology Refill Guideline reviewed.  Medication meets criteria for refill.    Ella Mcfarlane RN

## 2025-02-12 DIAGNOSIS — E78.5 HYPERLIPIDEMIA LDL GOAL <70: ICD-10-CM

## 2025-02-12 NOTE — TELEPHONE ENCOUNTER
Last Office Visit: 11/2/23 (Isidro)  Next Office Visit: TBD  Last Fill Date: 11/26/24  Liza Blum LPN Cardiology   2/12/2025 9:38 AM

## 2025-02-12 NOTE — TELEPHONE ENCOUNTER
[facility] Cardiology Refill Guideline reviewed.  Medication does not meet criteria for refill due to overdue for follow up.  Messaged to providers team for further review. Order extended. 30 day liane fill given and scheduling asked to call pt to schedule. Tiffany Manuel RN Cardiology February 12, 2025, 9:47 AM'

## 2025-06-02 ENCOUNTER — TELEPHONE (OUTPATIENT)
Dept: CARDIOLOGY | Facility: CLINIC | Age: 75
End: 2025-06-02
Payer: COMMERCIAL

## 2025-06-02 DIAGNOSIS — E78.5 HYPERLIPIDEMIA LDL GOAL <70: ICD-10-CM

## 2025-06-02 NOTE — TELEPHONE ENCOUNTER
[facility] Call Center    Phone Message    May a detailed message be left on voicemail: yes     Reason for Call: Medication Refill Request    Has the patient contacted the pharmacy for the refill? Yes   Name of medication being requested: evolocumab (REPATHA) 140 MG/ML prefilled autoinjector   Provider who prescribed the medication: Dr. Felix  Pharmacy:    [facility]  [address]     Date medication is needed: ASAP- As patient is out       Action Taken: Other: [place] Cardiology     Travel Screening: Not Applicable     Date of Service:

## 2025-06-02 NOTE — TELEPHONE ENCOUNTER
[facility] Cardiology Refill Guideline reviewed.  Medication meets criteria for refill.    Ella Mcfarlane RN

## 2025-07-30 ENCOUNTER — OFFICE VISIT (OUTPATIENT)
Dept: CARDIOLOGY | Facility: CLINIC | Age: 75
End: 2025-07-30
Attending: INTERNAL MEDICINE
Payer: COMMERCIAL

## 2025-07-30 VITALS
HEIGHT: 71 IN | RESPIRATION RATE: 16 BRPM | WEIGHT: 240.7 LBS | OXYGEN SATURATION: 96 % | BODY MASS INDEX: 33.7 KG/M2 | HEART RATE: 72 BPM | SYSTOLIC BLOOD PRESSURE: 119 MMHG | DIASTOLIC BLOOD PRESSURE: 68 MMHG

## 2025-07-30 DIAGNOSIS — I65.23 BILATERAL CAROTID ARTERY STENOSIS: ICD-10-CM

## 2025-07-30 DIAGNOSIS — Z95.1 S/P CABG (CORONARY ARTERY BYPASS GRAFT): ICD-10-CM

## 2025-07-30 DIAGNOSIS — I10 BENIGN ESSENTIAL HYPERTENSION: ICD-10-CM

## 2025-07-30 DIAGNOSIS — I25.10: ICD-10-CM

## 2025-07-30 DIAGNOSIS — I25.810 CORONARY ARTERY DISEASE INVOLVING CORONARY BYPASS GRAFT OF NATIVE HEART WITHOUT ANGINA PECTORIS: ICD-10-CM

## 2025-07-30 DIAGNOSIS — I21.9: ICD-10-CM

## 2025-07-30 DIAGNOSIS — E78.5 HYPERLIPIDEMIA LDL GOAL <70: ICD-10-CM

## 2025-07-30 LAB
CHOLEST SERPL-MCNC: 196 MG/DL
EST. AVERAGE GLUCOSE BLD GHB EST-MCNC: 134 MG/DL
FASTING STATUS PATIENT QL REPORTED: YES
HBA1C MFR BLD: 6.3 % (ref 0–5.6)
HDLC SERPL-MCNC: 35 MG/DL
LDLC SERPL CALC-MCNC: 138 MG/DL
NONHDLC SERPL-MCNC: 161 MG/DL
TRIGL SERPL-MCNC: 115 MG/DL

## 2025-07-30 NOTE — PROGRESS NOTES
HPI and Plan:   Today, I had the pleasure of connecting with Genaro Hernandezkokoroberto for a follow-up visit.  He is a pleasant 75 year old patient with a past medical history of CAD S/P CABG with LIMA to LAD, SVG to OM and PDA, ischemic cardiomyopathy with ejection fraction of 35% with recovered EF, hypertension, hyperlipidemia and mild to moderate bilateral carotid stenosis who presents to the clinic for a follow-up visit with his significant other.     The patient has not had any anginal symptoms since his last visit.  He is on a tiny dose of metoprolol and report generalized fatigue  He has history of severe myalgias when on statin and hence is now on Repatha.  Most recent lipid profile shows    He otherwise is doing well and does not report any chest pain or dyspnea on exertion.  His last echocardiogram showed mild increase in ejection fraction from 25-30 to 50%.  There was inferior, inferolateral and lateral wall hypokinesis.  CTA head and neck in 2018 and showed 60% right ICA stenosis and 30% left ICA stenosis.     Assessment and plan:    1.  CAD status post CABG x3  2.  Moderate carotid artery disease-60% right ICA, 30% left ICA  3.  Hyperlipidemia- on PCSK9, intolerant to statin  4.  Ischemic cardiomyopathy -recovered ejection fraction, EF 60% in 2021     Discussion  The patient is stable from coronary disease standpoint and denies chest pain, shortness of breath or exertional dyspnea.  He should be on aspirin indefinitely.  For moderate carotid artery disease, we will continue to aggressively modify his risk factors.  For hyperlipidemia, he is statin intolerance and was switched to Repatha.    The ischemic cardiomyopathy has completely resolved and most recent ejection fraction was 60% in 2021 in an outside study. Will repepat echo. He stopped BB in the past due to fatigue.     Plan  1. Eat heart healthy diet and exercise regularly.  2.  Repeat echo. Check lipids and A1c today.  3.  Follow up in 1  yr    Thank you for allowing me to participate in the care of David Richard Schoenecker    This note was completed in part using Dragon voice recognition software. Although reviewed after completion, some word and grammatical errors may occur.    Ramses Felix MD  Cardiology    No orders of the defined types were placed in this encounter.      No orders of the defined types were placed in this encounter.      There are no discontinued medications.      Encounter Diagnoses   Name Primary?    Coronary artery disease involving coronary bypass graft of native heart without angina pectoris     Bilateral carotid artery stenosis     Benign essential hypertension     Hyperlipidemia LDL goal <70     S/P CABG (coronary artery bypass graft)        CURRENT MEDICATIONS:  Current Outpatient Medications   Medication Sig Dispense Refill    aspirin (ASA) 81 MG tablet Take 1 tablet (81 mg) by mouth daily      calcium carbonate (TUMS) 500 MG chewable tablet Take 1 chew tab by mouth as needed for heartburn      evolocumab (REPATHA) 140 MG/ML prefilled autoinjector Inject 1 mL (140 mg) subcutaneously every 14 days. No further refills until seen by cardiology--call [phone] to schedule 6 mL 0       ALLERGIES     Allergies   Allergen Reactions    Statins Dizziness       PAST MEDICAL HISTORY:  Past Medical History:   Diagnosis Date    Benign essential hypertension     Coronary artery disease     11/2018 CABG    Heart disease     Ischemic cardiomyopathy 12/2018    echo 12/2018- EF 35%    Mixed hyperlipidemia        PAST SURGICAL HISTORY:  Past Surgical History:   Procedure Laterality Date    APPENDECTOMY      BYPASS GRAFT ARTERY CORONARY N/A 11/6/2018    Procedure: CORONARY ARTERY BYPASS GRAFTING x 3 (LIMA - LAD; SV - OM; SV - PDA) WITH ENDOSCOPIC VEIN HARVEST ON THE LEFT LOWER EXTREMITY    (ON PUMP OXYGENATOR ; BAL BY KEYA)   ;  Surgeon: Sander Wan MD;  Location:  OR    ENT SURGERY      tonsillectomy       FAMILY  HISTORY:  Family History   Problem Relation Age of Onset    Coronary Artery Disease Mother     Cerebrovascular Disease Mother     Myocardial Infarction Mother     Family History Negative Father     Unknown/Adopted Maternal Grandmother     Unknown/Adopted Maternal Grandfather     Unknown/Adopted Paternal Grandmother     Unknown/Adopted Paternal Grandfather     Heart Surgery Brother         bypass    Heart Surgery Brother         bypass    Heart Surgery Brother         bypass       SOCIAL HISTORY:  Social History     Socioeconomic History    Marital status:      Spouse name: None    Number of children: None    Years of education: None    Highest education level: None   Tobacco Use    Smoking status: Former     Current packs/day: 0.00     Types: Cigarettes     Start date: 1965     Quit date: 2018     Years since quittin.7    Smokeless tobacco: Never    Tobacco comments:     18: last cigarette. /2 ppd. Off/on for 50 years   Substance and Sexual Activity    Alcohol use: No    Drug use: No     Social Drivers of Health     Financial Resource Strain: Not on File (2023)    Received from [facility]    Financial Resource Strain     Financial Resource Strain: 0   Food Insecurity: Not at Risk (2024)    Received from [facility]    Food Insecurity     Within the past 12 months, you worried that your food would run out before you got money to buy more.: 1   Transportation Needs: Not at Risk (2024)    Received from [facility]    Transportation Needs     In the past 12 months, has lack of transportation kept you from medical appointments, meetings, work or from getting things needed for daily living?: 1   Physical Activity: Not on File (2023)    Received from [facility]    Physical Activity     Physical Activity: 0   Stress: Not on File (2023)    Received from [facility]    Stress     Stress: 0    Received from [facility] [employer]    Social Connections   Housing Stability: Not at  "Risk (9/6/2024)    Received from [facility]    Housing Stability     What is your living situation today? : 1       Review of Systems:  Skin:          Eyes:         ENT:         Respiratory:  Positive for dyspnea on exertion     Cardiovascular:    Positive for, fatigue    Gastroenterology:        Genitourinary:         Musculoskeletal:         Neurologic:         Psychiatric:         Heme/Lymph/Imm:         Endocrine:           Physical Exam:  Vitals: /68   Pulse 72   Resp 16   Ht 1.803 m (5' 11\")   Wt 109.2 kg (240 lb 11.2 oz)   SpO2 96%   BMI 33.57 kg/m    Eyes: No icterus.  Pulmonary: Chest symmetric, lungs clear bilaterally and no crackles, wheezes or rales.  Cardiovascular: RRR with normal S1 and S2, no murmur, JVP normal.  Musculoskeletal: Edema of the lower extremities: None.  Neurologic: Oriented and appropriate without obvious focal deficits.   Psychiatric: Normal affect.     Recent Lab Results:  LIPID RESULTS:  Lab Results   Component Value Date    CHOL 181 08/03/2022    CHOL 163 09/14/2020    HDL 35 (L) 08/03/2022    HDL 37 (L) 09/14/2020     (H) 08/03/2022     (H) 09/14/2020    TRIG 128 08/03/2022    TRIG 105 09/14/2020       LIVER ENZYME RESULTS:  Lab Results   Component Value Date    AST 34 11/04/2018    ALT 16 09/14/2020       CBC RESULTS:  Lab Results   Component Value Date    WBC 8.5 11/11/2018    RBC 2.76 (L) 11/11/2018    HGB 10.4 (L) 12/05/2018    HCT 28.3 (L) 11/11/2018     (H) 11/11/2018    MCH 33.7 (H) 11/11/2018    MCHC 32.9 11/11/2018    RDW 12.9 11/11/2018     11/11/2018       BMP RESULTS:  Lab Results   Component Value Date     12/12/2018    POTASSIUM 4.4 12/12/2018    CHLORIDE 104 12/12/2018    CO2 29 12/12/2018    ANIONGAP 9.4 12/12/2018     (H) 12/12/2018    BUN 22 12/12/2018    CR 1.17 12/12/2018    GFRESTIMATED 62 12/12/2018    GFRESTBLACK 75 12/12/2018    CONOR 9.6 12/12/2018        A1C RESULTS:  Lab Results   Component Value Date    A1C " 5.9 (H) 11/06/2018       INR RESULTS:  Lab Results   Component Value Date    INR 1.37 (H) 11/06/2018    INR 1.78 (H) 11/06/2018       CC  Ramses Felix MD  [address]  [place] [id number]    All medical records were reviewed in detail and discussed with the patient. Greater than 30 mins were spent with the patient, 50% of this time was spent on counseling and coordination of care.  After visit summary was printed and given to the patient.

## 2025-07-30 NOTE — LETTER
7/30/2025    Naseem Ramirez MD  [facility] [address]  [place]  [facility] [address]    RE: David Richard Schoenecker       Dear Colleague,     I had the pleasure of seeing Genaro Hernandezkokoroberto in the [facility].  HPI and Plan:   Today, I had the pleasure of connecting with Genaro Hernandezkokoroberto for a follow-up visit.  He is a pleasant 75 year old patient with a past medical history of CAD S/P CABG with LIMA to LAD, SVG to OM and PDA, ischemic cardiomyopathy with ejection fraction of 35% with recovered EF, hypertension, hyperlipidemia and mild to moderate bilateral carotid stenosis who presents to the clinic for a follow-up visit with his significant other.     The patient has not had any anginal symptoms since his last visit.  He is on a tiny dose of metoprolol and report generalized fatigue  He has history of severe myalgias when on statin and hence is now on Repatha.  Most recent lipid profile shows    He otherwise is doing well and does not report any chest pain or dyspnea on exertion.  His last echocardiogram showed mild increase in ejection fraction from 25-30 to 50%.  There was inferior, inferolateral and lateral wall hypokinesis.  CTA head and neck in 2018 and showed 60% right ICA stenosis and 30% left ICA stenosis.     Assessment and plan:    1.  CAD status post CABG x3  2.  Moderate carotid artery disease-60% right ICA, 30% left ICA  3.  Hyperlipidemia- on PCSK9, intolerant to statin  4.  Ischemic cardiomyopathy -recovered ejection fraction, EF 60% in 2021     Discussion  The patient is stable from coronary disease standpoint and denies chest pain, shortness of breath or exertional dyspnea.  He should be on aspirin indefinitely.  For moderate carotid artery disease, we will continue to aggressively modify his risk factors.  For hyperlipidemia, he is statin intolerance and was switched to Repatha.    The ischemic cardiomyopathy has completely resolved and  most recent ejection fraction was 60% in 2021 in an outside study. Will repepat echo. He stopped BB in the past due to fatigue.     Plan  1. Eat heart healthy diet and exercise regularly.  2.  Repeat echo. Check lipids and A1c today.  3.  Follow up in 1 yr    Thank you for allowing me to participate in the care of David Richard Schoenecker    This note was completed in part using Dragon voice recognition software. Although reviewed after completion, some word and grammatical errors may occur.    Ramses Felix MD  Cardiology    No orders of the defined types were placed in this encounter.      No orders of the defined types were placed in this encounter.      There are no discontinued medications.      Encounter Diagnoses   Name Primary?     Coronary artery disease involving coronary bypass graft of native heart without angina pectoris      Bilateral carotid artery stenosis      Benign essential hypertension      Hyperlipidemia LDL goal <70      S/P CABG (coronary artery bypass graft)        CURRENT MEDICATIONS:  Current Outpatient Medications   Medication Sig Dispense Refill     aspirin (ASA) 81 MG tablet Take 1 tablet (81 mg) by mouth daily       calcium carbonate (TUMS) 500 MG chewable tablet Take 1 chew tab by mouth as needed for heartburn       evolocumab (REPATHA) 140 MG/ML prefilled autoinjector Inject 1 mL (140 mg) subcutaneously every 14 days. No further refills until seen by cardiology--call [phone] to schedule 6 mL 0       ALLERGIES     Allergies   Allergen Reactions     Statins Dizziness       PAST MEDICAL HISTORY:  Past Medical History:   Diagnosis Date     Benign essential hypertension      Coronary artery disease     11/2018 CABG     Heart disease      Ischemic cardiomyopathy 12/2018    echo 12/2018- EF 35%     Mixed hyperlipidemia        PAST SURGICAL HISTORY:  Past Surgical History:   Procedure Laterality Date     APPENDECTOMY       BYPASS GRAFT ARTERY CORONARY N/A 11/6/2018    Procedure:  CORONARY ARTERY BYPASS GRAFTING x 3 (LIMA - LAD; SV - OM; SV - PDA) WITH ENDOSCOPIC VEIN HARVEST ON THE LEFT LOWER EXTREMITY    (ON PUMP OXYGENATOR ; BAL BY KEYA)   ;  Surgeon: Sander Wan MD;  Location: [facility] OR     ENT SURGERY      tonsillectomy       FAMILY HISTORY:  Family History   Problem Relation Age of Onset     Coronary Artery Disease Mother      Cerebrovascular Disease Mother      Myocardial Infarction Mother      Family History Negative Father      Unknown/Adopted Maternal Grandmother      Unknown/Adopted Maternal Grandfather      Unknown/Adopted Paternal Grandmother      Unknown/Adopted Paternal Grandfather      Heart Surgery Brother         bypass     Heart Surgery Brother         bypass     Heart Surgery Brother         bypass       SOCIAL HISTORY:  Social History     Socioeconomic History     Marital status:      Spouse name: None     Number of children: None     Years of education: None     Highest education level: None   Tobacco Use     Smoking status: Former     Current packs/day: 0.00     Types: Cigarettes     Start date: 1965     Quit date: 2018     Years since quittin.7     Smokeless tobacco: Never     Tobacco comments:     18: last cigarette. 1/2 ppd. Off/on for 50 years   Substance and Sexual Activity     Alcohol use: No     Drug use: No     Social Drivers of Health     Financial Resource Strain: Not on File (2023)    Received from [facility]    Financial Resource Strain      Financial Resource Strain: 0   Food Insecurity: Not at Risk (2024)    Received from [facility]    Food Insecurity      Within the past 12 months, you worried that your food would run out before you got money to buy more.: 1   Transportation Needs: Not at Risk (2024)    Received from [facility]    Transportation Needs      In the past 12 months, has lack of transportation kept you from medical appointments, meetings, work or from getting things needed for daily living?: 1   Physical  "Activity: Not on File (12/13/2023)    Received from [facility]    Physical Activity      Physical Activity: 0   Stress: Not on File (12/13/2023)    Received from [facility]    Stress      Stress: 0    Received from [facility]    Social Connections   Housing Stability: Not at Risk (9/6/2024)    Received from [facility]    Housing Stability      What is your living situation today? : 1       Review of Systems:  Skin:          Eyes:         ENT:         Respiratory:  Positive for dyspnea on exertion     Cardiovascular:    Positive for, fatigue    Gastroenterology:        Genitourinary:         Musculoskeletal:         Neurologic:         Psychiatric:         Heme/Lymph/Imm:         Endocrine:           Physical Exam:  Vitals: /68   Pulse 72   Resp 16   Ht 1.803 m (5' 11\")   Wt 109.2 kg (240 lb 11.2 oz)   SpO2 96%   BMI 33.57 kg/m    Eyes: No icterus.  Pulmonary: Chest symmetric, lungs clear bilaterally and no crackles, wheezes or rales.  Cardiovascular: RRR with normal S1 and S2, no murmur, JVP normal.  Musculoskeletal: Edema of the lower extremities: None.  Neurologic: Oriented and appropriate without obvious focal deficits.   Psychiatric: Normal affect.     Recent Lab Results:  LIPID RESULTS:  Lab Results   Component Value Date    CHOL 181 08/03/2022    CHOL 163 09/14/2020    HDL 35 (L) 08/03/2022    HDL 37 (L) 09/14/2020     (H) 08/03/2022     (H) 09/14/2020    TRIG 128 08/03/2022    TRIG 105 09/14/2020       LIVER ENZYME RESULTS:  Lab Results   Component Value Date    AST 34 11/04/2018    ALT 16 09/14/2020       CBC RESULTS:  Lab Results   Component Value Date    WBC 8.5 11/11/2018    RBC 2.76 (L) 11/11/2018    HGB 10.4 (L) 12/05/2018    HCT 28.3 (L) 11/11/2018     (H) 11/11/2018    MCH 33.7 (H) 11/11/2018    MCHC 32.9 11/11/2018    RDW 12.9 11/11/2018     11/11/2018       BMP RESULTS:  Lab Results   Component Value Date     12/12/2018    POTASSIUM " 4.4 12/12/2018    CHLORIDE 104 12/12/2018    CO2 29 12/12/2018    ANIONGAP 9.4 12/12/2018     (H) 12/12/2018    BUN 22 12/12/2018    CR 1.17 12/12/2018    GFRESTIMATED 62 12/12/2018    GFRESTBLACK 75 12/12/2018    CONOR 9.6 12/12/2018        A1C RESULTS:  Lab Results   Component Value Date    A1C 5.9 (H) 11/06/2018       INR RESULTS:  Lab Results   Component Value Date    INR 1.37 (H) 11/06/2018    INR 1.78 (H) 11/06/2018       CC  Ramses Felix MD  [address]  [place] [id number]    All medical records were reviewed in detail and discussed with the patient. Greater than 30 mins were spent with the patient, 50% of this time was spent on counseling and coordination of care.  After visit summary was printed and given to the patient.      Thank you for allowing me to participate in the care of your patient.      Sincerely,     Ramses Felix MD     [facility]  cc:   Ramses Felix MD  [address]  [place] [id number]

## (undated) DEVICE — SU STEEL 6 CCS 4X18" M654G

## (undated) DEVICE — PUNCH AORTIC 4.0MMX8" RCB40

## (undated) DEVICE — PROTECTOR ARM ONE-STEP TRENDELENBURG 40418

## (undated) DEVICE — SU PROLENE 6-0 C-1DA 30" M8706

## (undated) DEVICE — KIT ENDO VASOVIEW HEMOPRO 2 VH-4000

## (undated) DEVICE — SU PLEDGET SOFT TFE 3/8"X3/26"X1/16" PCP40

## (undated) DEVICE — BONE WAX OSTENE 3.5GM CT410

## (undated) DEVICE — SOL WATER IRRIG 1000ML BOTTLE 2F7114

## (undated) DEVICE — SURGICEL HEMOSTAT 2X14" 1951

## (undated) DEVICE — SU PROLENE 7-0 BV175-6 24' M8737

## (undated) DEVICE — CANNULA PERFUSION ARTERIAL 20FR 12" 77420

## (undated) DEVICE — SPONGE PEANUT

## (undated) DEVICE — SPONGE RAY-TEC 4X8" 7318

## (undated) DEVICE — DEVICE TISSUE STABILIZATION OCTOBASE 28707

## (undated) DEVICE — KIT WASH CELL SAVING ATL2001

## (undated) DEVICE — SU VICRYL 3-0 FS-1 27" J442H

## (undated) DEVICE — CANNULA PERFUSION VENOUS 3 STAGE 29FR 15" 91429

## (undated) DEVICE — PACK TUBING MINI VAC CUSTOM 1/2X3/8T BB9J78R4

## (undated) DEVICE — SU ETHIBOND 2-0 SHDA 30" X563H

## (undated) DEVICE — SU ETHIBOND 0 CT-1 CR 8X18" CX21D

## (undated) DEVICE — SU VICRYL 2-0 CT-1 27" UND J259H

## (undated) DEVICE — BLADE KNIFE BEAVER 6900

## (undated) DEVICE — DRSG KERLIX 4 1/2"X4YDS ROLL 6715

## (undated) DEVICE — PACK MINI VAC CUSTOM CARDOPULMONARY BB5Z97R15

## (undated) DEVICE — DRAIN CHEST TUBE 32FR STR 8032

## (undated) DEVICE — CANNULA PERFUSION AORTIC ROOT 22FR 20012

## (undated) DEVICE — CONNECTOR PREFUSION REDUCER 3/8X1/2" W/O LL 6013

## (undated) DEVICE — Device

## (undated) DEVICE — SU ETHIBOND 3-0 BBDA 36" X588H

## (undated) DEVICE — GLOVE PROTEXIS W/NEU-THERA 7.5  2D73TE75

## (undated) DEVICE — LINEN TOWEL PACK X6 WHITE 5487

## (undated) DEVICE — SYR EAR BULB 3OZ 0035830

## (undated) DEVICE — SU PROLENE 3-0 SHDA 36" 8522H

## (undated) DEVICE — SU PROLENE 7-0 BV-1DA 4X30" M8703

## (undated) DEVICE — SUCTION CATH AIRLIFE TRI-FLO W/CONTROL PORT 14FR  T60C

## (undated) DEVICE — SU PROLENE 4-0 SHDA 36" 8521H

## (undated) DEVICE — DECANTER BAG 2002S

## (undated) DEVICE — SOL NACL 0.9% IRRIG 1000ML BOTTLE 07138-09

## (undated) DEVICE — CONNECTOR DRAIN CHEST Y EXTENSION SET 19909

## (undated) DEVICE — SU PROLENE 4-0 RB-1DA 36" 8557H

## (undated) DEVICE — SOMASENSOR CEREBRAL OXIMETER ADULT SAFB-SM

## (undated) DEVICE — COVER TABLE POLY 65X90" 8186

## (undated) DEVICE — POSITIONER ASSIST ESSTECH 3S T401210S

## (undated) DEVICE — BLOWER/MISTER CLEARVIEW 22150

## (undated) DEVICE — DEVICE ASSEMBLY SUCTION/ANTI COAG BTC93

## (undated) DEVICE — RESERVOIR CELL SAVING BLOOD COLLECTION EL2120

## (undated) DEVICE — SU MYOSTERNAL WIRE  046-267

## (undated) DEVICE — SU VICRYL 0 CTX 36" J370H

## (undated) DEVICE — SOL RINGERS LACTATED 1000ML BAG 2B2324X

## (undated) DEVICE — LINEN LEG ROLL 5489

## (undated) DEVICE — PACK OPEN HEART PV12OH524

## (undated) DEVICE — LINEN TOWEL PACK X30 5481

## (undated) DEVICE — PREP CHLORAPREP W/ORANGE TINT 10.5ML 260715

## (undated) DEVICE — SU SILK 1 TIE 10X30" SA87G

## (undated) DEVICE — LINEN TOWEL PACK X5 5464

## (undated) DEVICE — SUCTION CANISTER MEDIVAC LINER 3000ML W/LID 65651-530

## (undated) DEVICE — CABLE MYO/LEAD PACING WHITE DISP 019-530

## (undated) RX ORDER — CEFAZOLIN SODIUM 1 G/3ML
INJECTION, POWDER, FOR SOLUTION INTRAMUSCULAR; INTRAVENOUS
Status: DISPENSED
Start: 2018-11-06

## (undated) RX ORDER — ALBUMIN, HUMAN INJ 5% 5 %
SOLUTION INTRAVENOUS
Status: DISPENSED
Start: 2018-11-06

## (undated) RX ORDER — FENTANYL CITRATE 50 UG/ML
INJECTION, SOLUTION INTRAMUSCULAR; INTRAVENOUS
Status: DISPENSED
Start: 2018-11-06

## (undated) RX ORDER — CEFAZOLIN SODIUM 2 G/100ML
INJECTION, SOLUTION INTRAVENOUS
Status: DISPENSED
Start: 2018-11-06

## (undated) RX ORDER — FENTANYL CITRATE 50 UG/ML
INJECTION, SOLUTION INTRAMUSCULAR; INTRAVENOUS
Status: DISPENSED
Start: 2018-11-05

## (undated) RX ORDER — PROTAMINE SULFATE 10 MG/ML
INJECTION, SOLUTION INTRAVENOUS
Status: DISPENSED
Start: 2018-11-06

## (undated) RX ORDER — HEPARIN SODIUM 1000 [USP'U]/ML
INJECTION, SOLUTION INTRAVENOUS; SUBCUTANEOUS
Status: DISPENSED
Start: 2018-11-06

## (undated) RX ORDER — LIDOCAINE HYDROCHLORIDE 20 MG/ML
INJECTION, SOLUTION EPIDURAL; INFILTRATION; INTRACAUDAL; PERINEURAL
Status: DISPENSED
Start: 2018-11-06

## (undated) RX ORDER — PROPOFOL 10 MG/ML
INJECTION, EMULSION INTRAVENOUS
Status: DISPENSED
Start: 2018-11-06

## (undated) RX ORDER — VASOPRESSIN 20 U/ML
INJECTION PARENTERAL
Status: DISPENSED
Start: 2018-11-06

## (undated) RX ORDER — NITROGLYCERIN 5 MG/ML
VIAL (ML) INTRAVENOUS
Status: DISPENSED
Start: 2018-11-05

## (undated) RX ORDER — LIDOCAINE HYDROCHLORIDE 10 MG/ML
INJECTION, SOLUTION EPIDURAL; INFILTRATION; INTRACAUDAL; PERINEURAL
Status: DISPENSED
Start: 2018-11-05

## (undated) RX ORDER — HEPARIN SODIUM 1000 [USP'U]/ML
INJECTION, SOLUTION INTRAVENOUS; SUBCUTANEOUS
Status: DISPENSED
Start: 2018-11-05

## (undated) RX ORDER — SUFENTANIL CITRATE 50 UG/ML
INJECTION EPIDURAL; INTRAVENOUS
Status: DISPENSED
Start: 2018-11-06

## (undated) RX ORDER — VERAPAMIL HYDROCHLORIDE 2.5 MG/ML
INJECTION, SOLUTION INTRAVENOUS
Status: DISPENSED
Start: 2018-11-05

## (undated) RX ORDER — VECURONIUM BROMIDE 1 MG/ML
INJECTION, POWDER, LYOPHILIZED, FOR SOLUTION INTRAVENOUS
Status: DISPENSED
Start: 2018-11-06

## (undated) RX ORDER — PAPAVERINE HYDROCHLORIDE 30 MG/ML
INJECTION INTRAMUSCULAR; INTRAVENOUS
Status: DISPENSED
Start: 2018-11-06